# Patient Record
Sex: FEMALE | Race: BLACK OR AFRICAN AMERICAN | NOT HISPANIC OR LATINO | URBAN - METROPOLITAN AREA
[De-identification: names, ages, dates, MRNs, and addresses within clinical notes are randomized per-mention and may not be internally consistent; named-entity substitution may affect disease eponyms.]

---

## 2017-03-15 ENCOUNTER — INPATIENT (INPATIENT)
Facility: HOSPITAL | Age: 82
LOS: 2 days | Discharge: ANOTHER HOSPITAL | End: 2017-03-18
Attending: FAMILY MEDICINE | Admitting: FAMILY MEDICINE
Payer: MEDICARE

## 2017-03-15 VITALS
TEMPERATURE: 98 F | DIASTOLIC BLOOD PRESSURE: 70 MMHG | WEIGHT: 154.1 LBS | HEART RATE: 79 BPM | OXYGEN SATURATION: 99 % | SYSTOLIC BLOOD PRESSURE: 182 MMHG | RESPIRATION RATE: 20 BRPM | HEIGHT: 59 IN

## 2017-03-15 LAB
ALBUMIN SERPL ELPH-MCNC: 3 G/DL — LOW (ref 3.3–5)
ALP SERPL-CCNC: 90 U/L — SIGNIFICANT CHANGE UP (ref 40–120)
ALT FLD-CCNC: 19 U/L — SIGNIFICANT CHANGE UP (ref 12–78)
ANION GAP SERPL CALC-SCNC: 10 MMOL/L — SIGNIFICANT CHANGE UP (ref 5–17)
APPEARANCE UR: ABNORMAL
APTT BLD: 32.8 SEC — SIGNIFICANT CHANGE UP (ref 27.5–37.4)
AST SERPL-CCNC: 24 U/L — SIGNIFICANT CHANGE UP (ref 15–37)
BACTERIA # UR AUTO: ABNORMAL
BASOPHILS # BLD AUTO: 0.1 K/UL — SIGNIFICANT CHANGE UP (ref 0–0.2)
BASOPHILS NFR BLD AUTO: 1.3 % — SIGNIFICANT CHANGE UP (ref 0–2)
BILIRUB SERPL-MCNC: 0.2 MG/DL — SIGNIFICANT CHANGE UP (ref 0.2–1.2)
BILIRUB UR-MCNC: NEGATIVE — SIGNIFICANT CHANGE UP
BUN SERPL-MCNC: 41 MG/DL — HIGH (ref 7–23)
CALCIUM SERPL-MCNC: 9.1 MG/DL — SIGNIFICANT CHANGE UP (ref 8.5–10.1)
CHLORIDE SERPL-SCNC: 102 MMOL/L — SIGNIFICANT CHANGE UP (ref 96–108)
CK MB BLD-MCNC: 2.7 % — SIGNIFICANT CHANGE UP (ref 0–3.5)
CK MB CFR SERPL CALC: 5.4 NG/ML — HIGH (ref 0.5–3.6)
CK SERPL-CCNC: 200 U/L — HIGH (ref 26–192)
CO2 SERPL-SCNC: 22 MMOL/L — SIGNIFICANT CHANGE UP (ref 22–31)
COLOR SPEC: YELLOW — SIGNIFICANT CHANGE UP
CREAT SERPL-MCNC: 1.79 MG/DL — HIGH (ref 0.5–1.3)
DIFF PNL FLD: ABNORMAL
EOSINOPHIL # BLD AUTO: 0 K/UL — SIGNIFICANT CHANGE UP (ref 0–0.5)
EOSINOPHIL NFR BLD AUTO: 0 % — SIGNIFICANT CHANGE UP (ref 0–6)
EPI CELLS # UR: SIGNIFICANT CHANGE UP
GLUCOSE SERPL-MCNC: 94 MG/DL — SIGNIFICANT CHANGE UP (ref 70–99)
GLUCOSE UR QL: NEGATIVE MG/DL — SIGNIFICANT CHANGE UP
HCT VFR BLD CALC: 31.8 % — LOW (ref 34.5–45)
HGB BLD-MCNC: 10.7 G/DL — LOW (ref 11.5–15.5)
KETONES UR-MCNC: NEGATIVE — SIGNIFICANT CHANGE UP
LEUKOCYTE ESTERASE UR-ACNC: ABNORMAL
LIDOCAIN IGE QN: 70 U/L — LOW (ref 73–393)
LYMPHOCYTES # BLD AUTO: 0.6 K/UL — LOW (ref 1–3.3)
LYMPHOCYTES # BLD AUTO: 10.7 % — LOW (ref 13–44)
MCHC RBC-ENTMCNC: 28.8 PG — SIGNIFICANT CHANGE UP (ref 27–34)
MCHC RBC-ENTMCNC: 33.6 GM/DL — SIGNIFICANT CHANGE UP (ref 32–36)
MCV RBC AUTO: 85.7 FL — SIGNIFICANT CHANGE UP (ref 80–100)
MONOCYTES # BLD AUTO: 0.4 K/UL — SIGNIFICANT CHANGE UP (ref 0–0.9)
MONOCYTES NFR BLD AUTO: 6.8 % — SIGNIFICANT CHANGE UP (ref 2–14)
NEUTROPHILS # BLD AUTO: 4.3 K/UL — SIGNIFICANT CHANGE UP (ref 1.8–7.4)
NEUTROPHILS NFR BLD AUTO: 81.1 % — HIGH (ref 43–77)
NITRITE UR-MCNC: NEGATIVE — SIGNIFICANT CHANGE UP
PH UR: 8 — SIGNIFICANT CHANGE UP (ref 4.8–8)
PLATELET # BLD AUTO: 229 K/UL — SIGNIFICANT CHANGE UP (ref 150–400)
POTASSIUM SERPL-MCNC: 5.2 MMOL/L — SIGNIFICANT CHANGE UP (ref 3.5–5.3)
POTASSIUM SERPL-SCNC: 5.2 MMOL/L — SIGNIFICANT CHANGE UP (ref 3.5–5.3)
PROT SERPL-MCNC: 7.9 GM/DL — SIGNIFICANT CHANGE UP (ref 6–8.3)
PROT UR-MCNC: 100 MG/DL
RBC # BLD: 3.71 M/UL — LOW (ref 3.8–5.2)
RBC # FLD: 13.5 % — SIGNIFICANT CHANGE UP (ref 11–15)
RBC CASTS # UR COMP ASSIST: SIGNIFICANT CHANGE UP /HPF (ref 0–4)
SODIUM SERPL-SCNC: 134 MMOL/L — LOW (ref 135–145)
SP GR SPEC: 1.01 — SIGNIFICANT CHANGE UP (ref 1.01–1.02)
TROPONIN I SERPL-MCNC: 0.66 NG/ML — HIGH (ref 0.01–0.04)
UROBILINOGEN FLD QL: NEGATIVE MG/DL — SIGNIFICANT CHANGE UP
WBC # BLD: 5.3 K/UL — SIGNIFICANT CHANGE UP (ref 3.8–10.5)
WBC # FLD AUTO: 5.3 K/UL — SIGNIFICANT CHANGE UP (ref 3.8–10.5)
WBC UR QL: ABNORMAL

## 2017-03-15 PROCEDURE — 74176 CT ABD & PELVIS W/O CONTRAST: CPT | Mod: 26

## 2017-03-15 PROCEDURE — 93880 EXTRACRANIAL BILAT STUDY: CPT | Mod: 26

## 2017-03-15 PROCEDURE — 99285 EMERGENCY DEPT VISIT HI MDM: CPT

## 2017-03-15 PROCEDURE — 71010: CPT | Mod: 26

## 2017-03-15 RX ORDER — IOHEXOL 300 MG/ML
30 INJECTION, SOLUTION INTRAVENOUS ONCE
Qty: 0 | Refills: 0 | Status: COMPLETED | OUTPATIENT
Start: 2017-03-15 | End: 2017-03-15

## 2017-03-15 RX ORDER — PANTOPRAZOLE SODIUM 20 MG/1
40 TABLET, DELAYED RELEASE ORAL ONCE
Qty: 0 | Refills: 0 | Status: COMPLETED | OUTPATIENT
Start: 2017-03-15 | End: 2017-03-15

## 2017-03-15 RX ORDER — SODIUM CHLORIDE 9 MG/ML
3 INJECTION INTRAMUSCULAR; INTRAVENOUS; SUBCUTANEOUS ONCE
Qty: 0 | Refills: 0 | Status: COMPLETED | OUTPATIENT
Start: 2017-03-15 | End: 2017-03-15

## 2017-03-15 RX ORDER — ONDANSETRON 8 MG/1
4 TABLET, FILM COATED ORAL ONCE
Qty: 0 | Refills: 0 | Status: COMPLETED | OUTPATIENT
Start: 2017-03-15 | End: 2017-03-15

## 2017-03-15 RX ADMIN — PANTOPRAZOLE SODIUM 40 MILLIGRAM(S): 20 TABLET, DELAYED RELEASE ORAL at 19:54

## 2017-03-15 RX ADMIN — IOHEXOL 30 MILLILITER(S): 300 INJECTION, SOLUTION INTRAVENOUS at 19:55

## 2017-03-15 RX ADMIN — SODIUM CHLORIDE 3 MILLILITER(S): 9 INJECTION INTRAMUSCULAR; INTRAVENOUS; SUBCUTANEOUS at 19:54

## 2017-03-15 RX ADMIN — ONDANSETRON 4 MILLIGRAM(S): 8 TABLET, FILM COATED ORAL at 19:56

## 2017-03-15 NOTE — ED PROVIDER NOTE - PSH
Amputation foot, unilat    S/P appendectomy    S/P cataract surgery    S/P foot surgery  left foot surgery with sage s/p fx.  S/P hysterectomy

## 2017-03-15 NOTE — ED PROVIDER NOTE - MEDICAL DECISION MAKING DETAILS
Patient with NSTEMI and UTI. Labs and imaging reviewed. Patient received ASA, plavix, heparin and ceftriaxone. She is now admitted to medicine for further management.

## 2017-03-15 NOTE — ED PROVIDER NOTE - CARE PLAN
Principal Discharge DX:	NSTEMI (non-ST elevated myocardial infarction)  Secondary Diagnosis:	UTI (urinary tract infection)

## 2017-03-15 NOTE — ED ADULT TRIAGE NOTE - CHIEF COMPLAINT QUOTE
patient c/o of N/V and diarrhea started yesterday morning . denied any bleeding , patient denied abdominal pain

## 2017-03-15 NOTE — ED PROVIDER NOTE - OBJECTIVE STATEMENT
Pertinent PMH/PSH/FHx/SHx and Review of Systems contained within:  85 yo f with PMH of DM and HTN presents in ED c/o 2 day history of epigastric pain associated with nbnb vomitus with loose stool. Patient also reports sudden, sharp right sided neck pain. No fever, No photophobia/eye pain/changes in vision, No ear pain/sore throat/dysphagia, No chest pain/palpitations, no SOB/cough/wheeze/stridor, No D, no dysuria/frequency/discharge, No back pain, no rash, no changes in neurological status/function

## 2017-03-16 LAB
AMYLASE P1 CFR SERPL: 163 U/L — HIGH (ref 25–115)
ANION GAP SERPL CALC-SCNC: 9 MMOL/L — SIGNIFICANT CHANGE UP (ref 5–17)
APTT BLD: 143.1 SEC — CRITICAL HIGH (ref 27.5–37.4)
BUN SERPL-MCNC: 39 MG/DL — HIGH (ref 7–23)
CALCIUM SERPL-MCNC: 9.1 MG/DL — SIGNIFICANT CHANGE UP (ref 8.5–10.1)
CHLORIDE SERPL-SCNC: 106 MMOL/L — SIGNIFICANT CHANGE UP (ref 96–108)
CHOLEST SERPL-MCNC: 187 MG/DL — SIGNIFICANT CHANGE UP (ref 10–199)
CO2 SERPL-SCNC: 23 MMOL/L — SIGNIFICANT CHANGE UP (ref 22–31)
CREAT SERPL-MCNC: 1.92 MG/DL — HIGH (ref 0.5–1.3)
GLUCOSE SERPL-MCNC: 68 MG/DL — LOW (ref 70–99)
HBA1C BLD-MCNC: 7.8 % — HIGH (ref 4–5.6)
HCT VFR BLD CALC: 29.7 % — LOW (ref 34.5–45)
HDLC SERPL-MCNC: 77 MG/DL — SIGNIFICANT CHANGE UP (ref 40–125)
HGB BLD-MCNC: 10 G/DL — LOW (ref 11.5–15.5)
LACTATE SERPL-SCNC: 0.5 MMOL/L — LOW (ref 0.7–2)
LIDOCAIN IGE QN: 59 U/L — LOW (ref 73–393)
LIPID PNL WITH DIRECT LDL SERPL: 102 MG/DL — SIGNIFICANT CHANGE UP
MAGNESIUM SERPL-MCNC: 2.5 MG/DL — HIGH (ref 1.8–2.4)
MCHC RBC-ENTMCNC: 29 PG — SIGNIFICANT CHANGE UP (ref 27–34)
MCHC RBC-ENTMCNC: 33.8 GM/DL — SIGNIFICANT CHANGE UP (ref 32–36)
MCV RBC AUTO: 85.6 FL — SIGNIFICANT CHANGE UP (ref 80–100)
NT-PROBNP SERPL-SCNC: 4877 PG/ML — HIGH (ref 0–450)
PLATELET # BLD AUTO: 201 K/UL — SIGNIFICANT CHANGE UP (ref 150–400)
POTASSIUM SERPL-MCNC: 4.9 MMOL/L — SIGNIFICANT CHANGE UP (ref 3.5–5.3)
POTASSIUM SERPL-SCNC: 4.9 MMOL/L — SIGNIFICANT CHANGE UP (ref 3.5–5.3)
RBC # BLD: 3.47 M/UL — LOW (ref 3.8–5.2)
RBC # FLD: 13.7 % — SIGNIFICANT CHANGE UP (ref 11–15)
SODIUM SERPL-SCNC: 138 MMOL/L — SIGNIFICANT CHANGE UP (ref 135–145)
T3 SERPL-MCNC: 78 NG/DL — LOW (ref 80–200)
T4 AB SER-ACNC: 6 UG/DL — SIGNIFICANT CHANGE UP (ref 4.6–12)
TOTAL CHOLESTEROL/HDL RATIO MEASUREMENT: 2.4 RATIO — LOW (ref 3.3–7.1)
TRIGL SERPL-MCNC: 42 MG/DL — SIGNIFICANT CHANGE UP (ref 10–149)
TROPONIN I SERPL-MCNC: 0.62 NG/ML — HIGH (ref 0.01–0.04)
TROPONIN I SERPL-MCNC: 0.63 NG/ML — HIGH (ref 0.01–0.04)
TSH SERPL-MCNC: 2.57 UIU/ML — SIGNIFICANT CHANGE UP (ref 0.36–3.74)
WBC # BLD: 3.8 K/UL — SIGNIFICANT CHANGE UP (ref 3.8–10.5)
WBC # FLD AUTO: 3.8 K/UL — SIGNIFICANT CHANGE UP (ref 3.8–10.5)

## 2017-03-16 PROCEDURE — 93306 TTE W/DOPPLER COMPLETE: CPT | Mod: 26

## 2017-03-16 RX ORDER — SODIUM CHLORIDE 9 MG/ML
1000 INJECTION, SOLUTION INTRAVENOUS
Qty: 0 | Refills: 0 | Status: DISCONTINUED | OUTPATIENT
Start: 2017-03-16 | End: 2017-03-18

## 2017-03-16 RX ORDER — CEFTRIAXONE 500 MG/1
1 INJECTION, POWDER, FOR SOLUTION INTRAMUSCULAR; INTRAVENOUS ONCE
Qty: 0 | Refills: 0 | Status: COMPLETED | OUTPATIENT
Start: 2017-03-16 | End: 2017-03-16

## 2017-03-16 RX ORDER — PANTOPRAZOLE SODIUM 20 MG/1
40 TABLET, DELAYED RELEASE ORAL
Qty: 0 | Refills: 0 | Status: DISCONTINUED | OUTPATIENT
Start: 2017-03-17 | End: 2017-03-17

## 2017-03-16 RX ORDER — METOPROLOL TARTRATE 50 MG
25 TABLET ORAL
Qty: 0 | Refills: 0 | Status: DISCONTINUED | OUTPATIENT
Start: 2017-03-16 | End: 2017-03-18

## 2017-03-16 RX ORDER — ASPIRIN/CALCIUM CARB/MAGNESIUM 324 MG
325 TABLET ORAL DAILY
Qty: 0 | Refills: 0 | Status: DISCONTINUED | OUTPATIENT
Start: 2017-03-16 | End: 2017-03-18

## 2017-03-16 RX ORDER — SODIUM CHLORIDE 9 MG/ML
1000 INJECTION INTRAMUSCULAR; INTRAVENOUS; SUBCUTANEOUS
Qty: 0 | Refills: 0 | Status: DISCONTINUED | OUTPATIENT
Start: 2017-03-16 | End: 2017-03-18

## 2017-03-16 RX ORDER — ASPIRIN/CALCIUM CARB/MAGNESIUM 324 MG
325 TABLET ORAL ONCE
Qty: 0 | Refills: 0 | Status: COMPLETED | OUTPATIENT
Start: 2017-03-16 | End: 2017-03-16

## 2017-03-16 RX ORDER — INSULIN GLARGINE 100 [IU]/ML
5 INJECTION, SOLUTION SUBCUTANEOUS AT BEDTIME
Qty: 0 | Refills: 0 | Status: DISCONTINUED | OUTPATIENT
Start: 2017-03-16 | End: 2017-03-16

## 2017-03-16 RX ORDER — LISINOPRIL 2.5 MG/1
5 TABLET ORAL DAILY
Qty: 0 | Refills: 0 | Status: COMPLETED | OUTPATIENT
Start: 2017-03-16 | End: 2017-03-16

## 2017-03-16 RX ORDER — CLOPIDOGREL BISULFATE 75 MG/1
300 TABLET, FILM COATED ORAL ONCE
Qty: 0 | Refills: 0 | Status: COMPLETED | OUTPATIENT
Start: 2017-03-16 | End: 2017-03-16

## 2017-03-16 RX ORDER — INSULIN GLARGINE 100 [IU]/ML
12 INJECTION, SOLUTION SUBCUTANEOUS EVERY MORNING
Qty: 0 | Refills: 0 | Status: DISCONTINUED | OUTPATIENT
Start: 2017-03-16 | End: 2017-03-18

## 2017-03-16 RX ORDER — CEFTRIAXONE 500 MG/1
1 INJECTION, POWDER, FOR SOLUTION INTRAMUSCULAR; INTRAVENOUS EVERY 24 HOURS
Qty: 0 | Refills: 0 | Status: DISCONTINUED | OUTPATIENT
Start: 2017-03-17 | End: 2017-03-18

## 2017-03-16 RX ORDER — HEPARIN SODIUM 5000 [USP'U]/ML
INJECTION INTRAVENOUS; SUBCUTANEOUS
Qty: 25000 | Refills: 0 | Status: DISCONTINUED | OUTPATIENT
Start: 2017-03-16 | End: 2017-03-16

## 2017-03-16 RX ORDER — HEPARIN SODIUM 5000 [USP'U]/ML
4100 INJECTION INTRAVENOUS; SUBCUTANEOUS ONCE
Qty: 0 | Refills: 0 | Status: COMPLETED | OUTPATIENT
Start: 2017-03-16 | End: 2017-03-16

## 2017-03-16 RX ORDER — PANTOPRAZOLE SODIUM 20 MG/1
40 TABLET, DELAYED RELEASE ORAL EVERY 12 HOURS
Qty: 0 | Refills: 0 | Status: DISCONTINUED | OUTPATIENT
Start: 2017-03-16 | End: 2017-03-16

## 2017-03-16 RX ORDER — HEPARIN SODIUM 5000 [USP'U]/ML
4100 INJECTION INTRAVENOUS; SUBCUTANEOUS EVERY 6 HOURS
Qty: 0 | Refills: 0 | Status: DISCONTINUED | OUTPATIENT
Start: 2017-03-16 | End: 2017-03-16

## 2017-03-16 RX ORDER — DEXTROSE 50 % IN WATER 50 %
12.5 SYRINGE (ML) INTRAVENOUS ONCE
Qty: 0 | Refills: 0 | Status: COMPLETED | OUTPATIENT
Start: 2017-03-16 | End: 2017-03-16

## 2017-03-16 RX ORDER — DEXTROSE 50 % IN WATER 50 %
1 SYRINGE (ML) INTRAVENOUS ONCE
Qty: 0 | Refills: 0 | Status: DISCONTINUED | OUTPATIENT
Start: 2017-03-16 | End: 2017-03-18

## 2017-03-16 RX ORDER — GLUCAGON INJECTION, SOLUTION 0.5 MG/.1ML
1 INJECTION, SOLUTION SUBCUTANEOUS ONCE
Qty: 0 | Refills: 0 | Status: DISCONTINUED | OUTPATIENT
Start: 2017-03-16 | End: 2017-03-18

## 2017-03-16 RX ORDER — CEFTRIAXONE 500 MG/1
INJECTION, POWDER, FOR SOLUTION INTRAMUSCULAR; INTRAVENOUS
Qty: 0 | Refills: 0 | Status: DISCONTINUED | OUTPATIENT
Start: 2017-03-16 | End: 2017-03-18

## 2017-03-16 RX ORDER — ATORVASTATIN CALCIUM 80 MG/1
20 TABLET, FILM COATED ORAL AT BEDTIME
Qty: 0 | Refills: 0 | Status: DISCONTINUED | OUTPATIENT
Start: 2017-03-16 | End: 2017-03-18

## 2017-03-16 RX ORDER — NITROGLYCERIN 6.5 MG
0.4 CAPSULE, EXTENDED RELEASE ORAL
Qty: 0 | Refills: 0 | Status: DISCONTINUED | OUTPATIENT
Start: 2017-03-16 | End: 2017-03-18

## 2017-03-16 RX ORDER — MORPHINE SULFATE 50 MG/1
2 CAPSULE, EXTENDED RELEASE ORAL EVERY 4 HOURS
Qty: 0 | Refills: 0 | Status: DISCONTINUED | OUTPATIENT
Start: 2017-03-16 | End: 2017-03-18

## 2017-03-16 RX ORDER — ENOXAPARIN SODIUM 100 MG/ML
30 INJECTION SUBCUTANEOUS DAILY
Qty: 0 | Refills: 0 | Status: DISCONTINUED | OUTPATIENT
Start: 2017-03-16 | End: 2017-03-18

## 2017-03-16 RX ORDER — INSULIN LISPRO 100/ML
5 VIAL (ML) SUBCUTANEOUS
Qty: 0 | Refills: 0 | Status: DISCONTINUED | OUTPATIENT
Start: 2017-03-16 | End: 2017-03-18

## 2017-03-16 RX ORDER — CLOPIDOGREL BISULFATE 75 MG/1
75 TABLET, FILM COATED ORAL DAILY
Qty: 0 | Refills: 0 | Status: DISCONTINUED | OUTPATIENT
Start: 2017-03-16 | End: 2017-03-18

## 2017-03-16 RX ORDER — DEXTROSE 50 % IN WATER 50 %
12.5 SYRINGE (ML) INTRAVENOUS ONCE
Qty: 0 | Refills: 0 | Status: DISCONTINUED | OUTPATIENT
Start: 2017-03-16 | End: 2017-03-18

## 2017-03-16 RX ADMIN — HEPARIN SODIUM 4100 UNIT(S): 5000 INJECTION INTRAVENOUS; SUBCUTANEOUS at 01:14

## 2017-03-16 RX ADMIN — PANTOPRAZOLE SODIUM 40 MILLIGRAM(S): 20 TABLET, DELAYED RELEASE ORAL at 06:19

## 2017-03-16 RX ADMIN — Medication 25 MILLIGRAM(S): at 01:13

## 2017-03-16 RX ADMIN — HEPARIN SODIUM 800 UNIT(S)/HR: 5000 INJECTION INTRAVENOUS; SUBCUTANEOUS at 01:40

## 2017-03-16 RX ADMIN — CLOPIDOGREL BISULFATE 300 MILLIGRAM(S): 75 TABLET, FILM COATED ORAL at 01:13

## 2017-03-16 RX ADMIN — Medication 25 MILLIGRAM(S): at 17:26

## 2017-03-16 RX ADMIN — Medication 325 MILLIGRAM(S): at 12:14

## 2017-03-16 RX ADMIN — Medication 325 MILLIGRAM(S): at 01:17

## 2017-03-16 RX ADMIN — ATORVASTATIN CALCIUM 20 MILLIGRAM(S): 80 TABLET, FILM COATED ORAL at 21:37

## 2017-03-16 RX ADMIN — ENOXAPARIN SODIUM 30 MILLIGRAM(S): 100 INJECTION SUBCUTANEOUS at 12:14

## 2017-03-16 RX ADMIN — Medication 5 UNIT(S): at 13:07

## 2017-03-16 RX ADMIN — CEFTRIAXONE 100 GRAM(S): 500 INJECTION, POWDER, FOR SOLUTION INTRAMUSCULAR; INTRAVENOUS at 01:17

## 2017-03-16 RX ADMIN — SODIUM CHLORIDE 60 MILLILITER(S): 9 INJECTION INTRAMUSCULAR; INTRAVENOUS; SUBCUTANEOUS at 17:26

## 2017-03-16 RX ADMIN — ATORVASTATIN CALCIUM 20 MILLIGRAM(S): 80 TABLET, FILM COATED ORAL at 01:13

## 2017-03-16 RX ADMIN — LISINOPRIL 5 MILLIGRAM(S): 2.5 TABLET ORAL at 06:20

## 2017-03-16 RX ADMIN — CLOPIDOGREL BISULFATE 75 MILLIGRAM(S): 75 TABLET, FILM COATED ORAL at 12:13

## 2017-03-16 RX ADMIN — Medication 12.5 GRAM(S): at 02:34

## 2017-03-16 NOTE — H&P ADULT. - ASSESSMENT
> troponin, r/o ACS; UTI, gastroenteritis. Brittle DM  ASA plavix, heparin, B -blocker, Statin  Cardiology.  BS control

## 2017-03-16 NOTE — CONSULT NOTE ADULT - SUBJECTIVE AND OBJECTIVE BOX
MEDICAL RECORD REVIEWED; HISTORY AND  REVIEW OF SYSTEMS OBTAINED; PATIENT EXAMINED:    CALLED FOR CHEST PAIN IN 84 YEAR OLD FEMALE; EQUIVOCAL TROPONIN; ECG: NSR LAFB/RBBB/VPC  ALL LABS/RADIOLOGY/MEDICATIONS REVIEWED;  NO PRESNT COMPLAINTS OF CHEST PAIN NO JVD;CLEAR LUNGS; REGULAR RATE & RHYTHM, NORMAL S1& S2, NO SIGNIFICANT MURMURS; NON TENDER ABDOMEN; NO EDEMA    IMPRESSION:    EQUIVOCAL TROPONINS/CHEST PAIN: POSSIBLE MYOCARDIAL ISCHEMIA  MONITOR TROPONINS & ECG  AGREE WITH PRESENT MANAGEMENT   RECCOMMENDATIONS PENDING COURSE    DAYO COTTON MD, FACC

## 2017-03-16 NOTE — CONSULT NOTE ADULT - SUBJECTIVE AND OBJECTIVE BOX
Patient is a 84y old  Female who presents with a chief complaint of epigastric pain, vomiting in addition to mild limited diarrhea. Noted Cr elevation 1.7-1.9 above baseline of 1.4. Troponin trend noted. Has long standing hx of DM with complications of retinopathy, neuropathy and prob nephropathy.       HPI:  pt had on Tu and Wd in am episodes of vomiting, 3x loose BM. On Wd BS < 56 in AM. had pain in epigastric area and R neck.  Presently no c/o (16 Mar 2017 00:34)      PAST MEDICAL & SURGICAL HISTORY:  PVD (peripheral vascular disease)  HTN (hypertension)  DM (diabetes mellitus screen)  S/P foot surgery: left foot surgery with sage s/p fx.  S/P cataract surgery  S/P appendectomy  S/P hysterectomy  Amputation foot, unilat      Social Hx: denies smoking    FAMILY HISTORY:  No pertinent family history in first degree relatives      Allergies    codeine (Other)    Intolerances        MEDICATIONS  (STANDING):  atorvastatin 20milliGRAM(s) Oral at bedtime  metoprolol 25milliGRAM(s) Oral two times a day  clopidogrel Tablet 75milliGRAM(s) Oral daily  insulin lispro Injectable (HumaLOG) 5Unit(s) SubCutaneous three times a day before meals  dextrose 5%. 1000milliLiter(s) IV Continuous <Continuous>  dextrose 50% Injectable 12.5Gram(s) IV Push once  pantoprazole  Injectable 40milliGRAM(s) IV Push every 12 hours  cefTRIAXone   IVPB  IV Intermittent   aspirin 325milliGRAM(s) Oral daily  enoxaparin Injectable 30milliGRAM(s) SubCutaneous daily  insulin glargine Injectable (LANTUS) 12Unit(s) SubCutaneous every morning  sodium chloride 0.9%. 1000milliLiter(s) IV Continuous <Continuous>    MEDICATIONS  (PRN):  nitroglycerin     SubLingual 0.4milliGRAM(s) SubLingual every 5 minutes PRN Chest Pain  dextrose Gel 1Dose(s) Oral once PRN Blood Glucose LESS THAN 70 milliGRAM(s)/deciliter  glucagon  Injectable 1milliGRAM(s) IntraMuscular once PRN Glucose LESS THAN 70 milligrams/deciliter  morphine  - Injectable 2milliGRAM(s) IV Push every 4 hours PRN Moderate Pain (4 - 6)      Daily Height in cm: 149.86 (15 Mar 2017 18:08)    Daily     Drug Dosing Weight  Height (cm): 149.9 (15 Mar 2017 18:08)  Weight (kg): 67.9 (16 Mar 2017 04:30)  BMI (kg/m2): 30.2 (16 Mar 2017 04:30)  BSA (m2): 1.63 (16 Mar 2017 04:30)      REVIEW OF SYSTEMS:    CONSTITUTIONAL: pos fatigue  ENMT:  No difficulty hearing, tinnitus, vertigo; No sinus or throat pain  NECK: No pain or stiffness  RESPIRATORY: No cough, wheezing, chills or hemoptysis; transient shortness of breath  CARDIOVASCULAR: transient chest pain, palpitations, dizziness, or leg swelling  GASTROINTESTINAL: No abdominal or epigastric pain. POS nausea, vomiting, diarrhea.  GENITOURINARY: No dysuria, frequency, hematuria, or incontinence  SKIN: No itching, burning, rashes, or lesions   LYMPH NODES: No enlarged glands  NEURO: no asterixis            I&O's Detail        PHYSICAL EXAM:    GENERAL: NAD  HEAD:  Atraumatic, Normocephalic  EYES: EOMI, PERRLA, conjunctiva and sclera clear  ENMT: No tonsillar erythema, exudates, or enlargement; Moist mucous membranes, Good dentition, No lesions  NECK: Supple, No JVD, Normal thyroid  NERVOUS SYSTEM:  Alert & Oriented X3, Good concentration; Motor Strength 5/5 B/L upper and lower extremities; DTRs 2+ intact and symmetric  CHEST/LUNG: Clear to percussion bilaterally; No rales, rhonchi, wheezing, or rubs  HEART: Regular rate and rhythm; No murmurs, rubs, or gallops  ABDOMEN: Soft, Nontender, Nondistended; Bowel sounds present  EXTREMITIES:  2+ Peripheral Pulses, No clubbing, cyanosis, or edema  LYMPH: No lymphadenopathy noted  SKIN: No rashes or lesions    LABS:  CBC Full  -  ( 16 Mar 2017 08:24 )  WBC Count : 3.8 K/uL  Hemoglobin : 10.0 g/dL  Hematocrit : 29.7 %  Platelet Count - Automated : 201 K/uL  Mean Cell Volume : 85.6 fl  Mean Cell Hemoglobin : 29.0 pg  Mean Cell Hemoglobin Concentration : 33.8 gm/dL  Auto Neutrophil # : x  Auto Lymphocyte # : x  Auto Monocyte # : x  Auto Eosinophil # : x  Auto Basophil # : x  Auto Neutrophil % : x  Auto Lymphocyte % : x  Auto Monocyte % : x  Auto Eosinophil % : x  Auto Basophil % : x    16 Mar 2017 08:24    138    |  106    |  39     ----------------------------<  68     4.9     |  23     |  1.92     Ca    9.1        16 Mar 2017 08:24    TPro  7.9    /  Alb  3.0    /  TBili  0.2    /  DBili  x      /  AST  24     /  ALT  19     /  AlkPhos  90     15 Mar 2017 21:20    CAPILLARY BLOOD GLUCOSE  76 (16 Mar 2017 07:29)    PTT - ( 16 Mar 2017 08:24 )  PTT:143.1 sec  Urinalysis Basic - ( 15 Mar 2017 21:06 )    Color: Yellow / Appearance: Slightly Turbid / S.010 / pH: x  Gluc: x / Ketone: Negative  / Bili: Negative / Urobili: Negative mg/dL   Blood: x / Protein: 100 mg/dL / Nitrite: Negative   Leuk Esterase: Moderate / RBC: 0-2 /HPF / WBC 26-50   Sq Epi: x / Non Sq Epi: Occasional / Bacteria: TNTC      CARDIAC MARKERS ( 16 Mar 2017 08:24 )  .628 ng/mL / x     / x     / x     / x      CARDIAC MARKERS ( 16 Mar 2017 02:49 )  .618 ng/mL / x     / x     / x     / x      CARDIAC MARKERS ( 15 Mar 2017 21:20 )  .664 ng/mL / x     / 200 U/L / x     / 5.4 ng/mL      ASSESSMENT and PLAN:  * MAXIMINO on CKD -- no hydro on CT A/P. Suspect pre-renal azotemia as etiology. Obtain UA, FeNa. Agree with a trial of saline for 24h as long as pt is not SOB.   Repeat BMP in am

## 2017-03-16 NOTE — H&P ADULT. - HISTORY OF PRESENT ILLNESS
pt had on Tu and Wd in am episodes of vomiting, 3x loose BM. On Wd BS < 56 in AM. had pain in epigastric area and R neck.  Presently no c/o

## 2017-03-17 LAB
ANION GAP SERPL CALC-SCNC: 10 MMOL/L — SIGNIFICANT CHANGE UP (ref 5–17)
BUN SERPL-MCNC: 34 MG/DL — HIGH (ref 7–23)
CALCIUM SERPL-MCNC: 8.5 MG/DL — SIGNIFICANT CHANGE UP (ref 8.5–10.1)
CHLORIDE SERPL-SCNC: 108 MMOL/L — SIGNIFICANT CHANGE UP (ref 96–108)
CO2 SERPL-SCNC: 23 MMOL/L — SIGNIFICANT CHANGE UP (ref 22–31)
CREAT SERPL-MCNC: 1.82 MG/DL — HIGH (ref 0.5–1.3)
CULTURE RESULTS: SIGNIFICANT CHANGE UP
GLUCOSE SERPL-MCNC: 115 MG/DL — HIGH (ref 70–99)
HCT VFR BLD CALC: 29.2 % — LOW (ref 34.5–45)
HGB BLD-MCNC: 9.4 G/DL — LOW (ref 11.5–15.5)
MCHC RBC-ENTMCNC: 28.2 PG — SIGNIFICANT CHANGE UP (ref 27–34)
MCHC RBC-ENTMCNC: 32.4 GM/DL — SIGNIFICANT CHANGE UP (ref 32–36)
MCV RBC AUTO: 86.9 FL — SIGNIFICANT CHANGE UP (ref 80–100)
PLATELET # BLD AUTO: 206 K/UL — SIGNIFICANT CHANGE UP (ref 150–400)
POTASSIUM SERPL-MCNC: 4.7 MMOL/L — SIGNIFICANT CHANGE UP (ref 3.5–5.3)
POTASSIUM SERPL-SCNC: 4.7 MMOL/L — SIGNIFICANT CHANGE UP (ref 3.5–5.3)
RBC # BLD: 3.36 M/UL — LOW (ref 3.8–5.2)
RBC # FLD: 13.9 % — SIGNIFICANT CHANGE UP (ref 11–15)
SODIUM SERPL-SCNC: 141 MMOL/L — SIGNIFICANT CHANGE UP (ref 135–145)
SPECIMEN SOURCE: SIGNIFICANT CHANGE UP
WBC # BLD: 3.9 K/UL — SIGNIFICANT CHANGE UP (ref 3.8–10.5)
WBC # FLD AUTO: 3.9 K/UL — SIGNIFICANT CHANGE UP (ref 3.8–10.5)

## 2017-03-17 RX ORDER — PANTOPRAZOLE SODIUM 20 MG/1
40 TABLET, DELAYED RELEASE ORAL
Qty: 0 | Refills: 0 | Status: DISCONTINUED | OUTPATIENT
Start: 2017-03-17 | End: 2017-03-18

## 2017-03-17 RX ORDER — REGADENOSON 0.08 MG/ML
0.4 INJECTION, SOLUTION INTRAVENOUS ONCE
Qty: 0 | Refills: 0 | Status: DISCONTINUED | OUTPATIENT
Start: 2017-03-17 | End: 2017-03-18

## 2017-03-17 RX ADMIN — Medication 325 MILLIGRAM(S): at 11:33

## 2017-03-17 RX ADMIN — Medication 5 UNIT(S): at 11:33

## 2017-03-17 RX ADMIN — ENOXAPARIN SODIUM 30 MILLIGRAM(S): 100 INJECTION SUBCUTANEOUS at 11:33

## 2017-03-17 RX ADMIN — CEFTRIAXONE 100 GRAM(S): 500 INJECTION, POWDER, FOR SOLUTION INTRAMUSCULAR; INTRAVENOUS at 01:31

## 2017-03-17 RX ADMIN — CLOPIDOGREL BISULFATE 75 MILLIGRAM(S): 75 TABLET, FILM COATED ORAL at 11:33

## 2017-03-17 RX ADMIN — Medication 25 MILLIGRAM(S): at 17:26

## 2017-03-17 RX ADMIN — Medication 25 MILLIGRAM(S): at 05:27

## 2017-03-17 RX ADMIN — PANTOPRAZOLE SODIUM 40 MILLIGRAM(S): 20 TABLET, DELAYED RELEASE ORAL at 06:07

## 2017-03-17 RX ADMIN — PANTOPRAZOLE SODIUM 40 MILLIGRAM(S): 20 TABLET, DELAYED RELEASE ORAL at 17:26

## 2017-03-17 RX ADMIN — ATORVASTATIN CALCIUM 20 MILLIGRAM(S): 80 TABLET, FILM COATED ORAL at 22:10

## 2017-03-17 NOTE — PROGRESS NOTE ADULT - SUBJECTIVE AND OBJECTIVE BOX
VAGUE CHEST DISCOMFORT PERSISTS  TROPONIN REMAINS EQUIVOCAL  PHARMACOLOGICAL NUCLEAR STRESS TEST ON MONDAY TO CLARIFY

## 2017-03-17 NOTE — PROGRESS NOTE ADULT - SUBJECTIVE AND OBJECTIVE BOX
Patient is a 84y old  Female who presents with a chief complaint of epigastric pain, vomiting (16 Mar 2017 00:34)  "I felt good yesterday this AM epigastric pain"      INTERVAL HPI/OVERNIGHT EVENTS:    MEDICATIONS  (STANDING):  atorvastatin 20milliGRAM(s) Oral at bedtime  metoprolol 25milliGRAM(s) Oral two times a day  clopidogrel Tablet 75milliGRAM(s) Oral daily  insulin lispro Injectable (HumaLOG) 5Unit(s) SubCutaneous three times a day before meals  dextrose 5%. 1000milliLiter(s) IV Continuous <Continuous>  dextrose 50% Injectable 12.5Gram(s) IV Push once  cefTRIAXone   IVPB  IV Intermittent   aspirin 325milliGRAM(s) Oral daily  cefTRIAXone   IVPB 1Gram(s) IV Intermittent every 24 hours  enoxaparin Injectable 30milliGRAM(s) SubCutaneous daily  insulin glargine Injectable (LANTUS) 12Unit(s) SubCutaneous every morning  sodium chloride 0.9%. 1000milliLiter(s) IV Continuous <Continuous>  pantoprazole    Tablet 40milliGRAM(s) Oral before breakfast    MEDICATIONS  (PRN):  nitroglycerin     SubLingual 0.4milliGRAM(s) SubLingual every 5 minutes PRN Chest Pain  dextrose Gel 1Dose(s) Oral once PRN Blood Glucose LESS THAN 70 milliGRAM(s)/deciliter  glucagon  Injectable 1milliGRAM(s) IntraMuscular once PRN Glucose LESS THAN 70 milligrams/deciliter  morphine  - Injectable 2milliGRAM(s) IV Push every 4 hours PRN Moderate Pain (4 - 6)      Allergies    codeine (Other)    Intolerances        REVIEW OF SYSTEMS:        HEENT - wnl  RESPIRATORY: No cough, wheezing, chills or hemoptysis; No shortness of breath  CARDIOVASCULAR: No chest pain, palpitations, dizziness, or leg swelling  GASTROINTESTINAL:  epigastric pain. No nausea, vomiting, or hematemesis; Yesterday normal BM  GENITOURINARY: No dysuria, frequency, hematuria, or incontinence  SKIN: No itching, burning, rashes, or lesions   MUSCULOSKELETAL: No joint pain or swelling; No muscle, back, or extremity pain  PSYCHIATRIC: No depression, anxiety, mood swings, or difficulty sleeping        Vital Signs Last 24 Hrs  T(C): 36.4, Max: 36.9 (03-16 @ 17:34)  T(F): 97.5, Max: 98.4 (03-16 @ 17:34)  HR: 53 (53 - 77)  BP: 151/73 (151/73 - 159/51)  BP(mean): --  RR: 18 (16 - 18)  SpO2: 95% (95% - 99%)    PHYSICAL EXAM:  general       HEENT wnl  CHEST/LUNG: Clear to percussion bilaterally; No rales, rhonchi, wheezing, or rubs  HEART: Regular rate and rhythm; No murmurs, rubs, or gallops  ABDOMEN: Soft, Nontender, Nondistended; Bowel sounds present  EXTREMITIES:  2+ Peripheral Pulses, No clubbing, cyanosis, or edema  LYMPH: No lymphadenopathy noted  SKIN: No rashes or lesions    LABS:                        9.4    3.9   )-----------( 206      ( 17 Mar 2017 07:46 )             29.2     17 Mar 2017 07:46    141    |  108    |  34     ----------------------------<  115    4.7     |  23     |  1.82     Ca    8.5        17 Mar 2017 07:46  Mg     2.5       16 Mar 2017 10:51    TPro  7.9    /  Alb  3.0    /  TBili  0.2    /  DBili  x      /  AST  24     /  ALT  19     /  AlkPhos  90     15 Mar 2017 21:20    PTT - ( 16 Mar 2017 08:24 )  PTT:143.1 sec  Urinalysis Basic - ( 15 Mar 2017 21:06 )    Color: Yellow / Appearance: Slightly Turbid / S.010 / pH: x  Gluc: x / Ketone: Negative  / Bili: Negative / Urobili: Negative mg/dL   Blood: x / Protein: 100 mg/dL / Nitrite: Negative   Leuk Esterase: Moderate / RBC: 0-2 /HPF / WBC 26-50   Sq Epi: x / Non Sq Epi: Occasional / Bacteria: TNTC      CAPILLARY BLOOD GLUCOSE  114 (17 Mar 2017 08:00)  221 (16 Mar 2017 21:41)  67 (16 Mar 2017 17:20)  220 (16 Mar 2017 12:18)      CARDIAC MARKERS ( 16 Mar 2017 08:24 )  .628 ng/mL / x     / x     / x     / x      CARDIAC MARKERS ( 16 Mar 2017 02:49 )  .618 ng/mL / x     / x     / x     / x      CARDIAC MARKERS ( 15 Mar 2017 21:20 )  .664 ng/mL / x     / 200 U/L / x     / 5.4 ng/mL      Hemoglobin A1C, Whole Blood: 7.8 % ( @ 10:37)    Cholesterol, Serum: 187 mg/dL ( @ 08:23)  HDL Cholesterol, Serum: 77 mg/dL ( @ 08:23)  Triglycerides, Serum: 42 mg/dL ( @ 08:23)      RADIOLOGY & ADDITIONAL TESTS:    Imaging Personally Reviewed:  [y ] YES  [ ] NO    Consultant(s) Notes Reviewed:  [y ] YES  [ ] NO    Care Discussed with Consultants/Other Providers [ ] YES  [ ] NO    PROBLEMS:  NSTEMI, URINARY TRACT INFECTION  VOMITING ; DIARRHEA; TREMBLING  NSTEMI (non-ST elevated myocardial infarction)  ? gastritis ? gastroparesis  ARF/ CKD nephropathy      Care discussed with family,         [  v]   yes  [  ]  No    imp:    stable[ ]    unstable[  ]     improving [v   ]       unchanged  [  ]                Plans:  Continue present plans  [ v ] GI consult. OOB. d/c telemetry

## 2017-03-17 NOTE — PHYSICAL THERAPY INITIAL EVALUATION ADULT - MODIFIED CLINICAL TEST OF SENSORY INTEGRATION IN BALANCE TEST
Barthel Index: Feeding Score 10___, Bathing Score _5__, Grooming Score __5_, Dressing Score _10__, Bowels Score _10__, Bladder Score _10__, Toilet Score 10___, Transfers Score _15__, Mobility Score 15___, Stairs Score __10_,     Total Score ___100

## 2017-03-17 NOTE — PROGRESS NOTE ADULT - SUBJECTIVE AND OBJECTIVE BOX
Patient seen in follow up for CKD3; mild epigastric discomfort.  No n/v;     MEDICATIONS  (STANDING):  atorvastatin 20milliGRAM(s) Oral at bedtime  metoprolol 25milliGRAM(s) Oral two times a day  clopidogrel Tablet 75milliGRAM(s) Oral daily  insulin lispro Injectable (HumaLOG) 5Unit(s) SubCutaneous three times a day before meals  dextrose 5%. 1000milliLiter(s) IV Continuous <Continuous>  dextrose 50% Injectable 12.5Gram(s) IV Push once  cefTRIAXone   IVPB  IV Intermittent   aspirin 325milliGRAM(s) Oral daily  cefTRIAXone   IVPB 1Gram(s) IV Intermittent every 24 hours  enoxaparin Injectable 30milliGRAM(s) SubCutaneous daily  insulin glargine Injectable (LANTUS) 12Unit(s) SubCutaneous every morning  sodium chloride 0.9%. 1000milliLiter(s) IV Continuous <Continuous>  pantoprazole    Tablet 40milliGRAM(s) Oral two times a day before meals  regadenoson Injectable 0.4milliGRAM(s) IV Push once    MEDICATIONS  (PRN):  nitroglycerin     SubLingual 0.4milliGRAM(s) SubLingual every 5 minutes PRN Chest Pain  dextrose Gel 1Dose(s) Oral once PRN Blood Glucose LESS THAN 70 milliGRAM(s)/deciliter  glucagon  Injectable 1milliGRAM(s) IntraMuscular once PRN Glucose LESS THAN 70 milligrams/deciliter  morphine  - Injectable 2milliGRAM(s) IV Push every 4 hours PRN Moderate Pain (4 - 6)    PHYSICAL EXAM:      T(C): 36.8, Max: 36.9 (03-16 @ 17:34)  HR: 69 (53 - 77)  BP: 187/69 (151/73 - 187/69)  RR: 18 (18 - 18)  SpO2: 98% (95% - 99%)  Wt(kg): --  Respiratory: clear anteriorly, decreased BS at bases  Cardiovascular: S1 S2  Gastrointestinal: soft NT ND +BS  Extremities:   tr edema                                    9.4    3.9   )-----------( 206      ( 17 Mar 2017 07:46 )             29.2     17 Mar 2017 07:46    141    |  108    |  34     ----------------------------<  115    4.7     |  23     |  1.82     Ca    8.5        17 Mar 2017 07:46  Mg     2.5       16 Mar 2017 10:51    TPro  7.9    /  Alb  3.0    /  TBili  0.2    /  DBili  x      /  AST  24     /  ALT  19     /  AlkPhos  90     15 Mar 2017 21:20      LIVER FUNCTIONS - ( 15 Mar 2017 21:20 )  Alb: 3.0 g/dL / Pro: 7.9 gm/dL / ALK PHOS: 90 U/L / ALT: 19 U/L / AST: 24 U/L / GGT: x             Assessment and Plan:  CKD 3 stable;   Continue IVF until PO intake adequate;  GI follow up.

## 2017-03-17 NOTE — CONSULT NOTE ADULT - SUBJECTIVE AND OBJECTIVE BOX
full consult dictated    Agree with BID Protonix  if symptoms persist consider adding carafate  For nuclear stress test on Monday full consult dictated    Pt with h/o PUD - symptoms are similar  Agree with BID Protonix  if symptoms persist consider adding carafate  For nuclear stress test on Monday

## 2017-03-18 VITALS
TEMPERATURE: 99 F | HEART RATE: 60 BPM | SYSTOLIC BLOOD PRESSURE: 173 MMHG | OXYGEN SATURATION: 98 % | RESPIRATION RATE: 18 BRPM | DIASTOLIC BLOOD PRESSURE: 60 MMHG

## 2017-03-18 LAB
ANION GAP SERPL CALC-SCNC: 6 MMOL/L — SIGNIFICANT CHANGE UP (ref 5–17)
BASOPHILS # BLD AUTO: 0.2 K/UL — SIGNIFICANT CHANGE UP (ref 0–0.2)
BASOPHILS NFR BLD AUTO: 3.5 % — HIGH (ref 0–2)
BUN SERPL-MCNC: 31 MG/DL — HIGH (ref 7–23)
CALCIUM SERPL-MCNC: 8.6 MG/DL — SIGNIFICANT CHANGE UP (ref 8.5–10.1)
CHLORIDE SERPL-SCNC: 111 MMOL/L — HIGH (ref 96–108)
CK MB BLD-MCNC: 2.6 % — SIGNIFICANT CHANGE UP (ref 0–3.5)
CK MB CFR SERPL CALC: 2.7 NG/ML — SIGNIFICANT CHANGE UP (ref 0.5–3.6)
CK SERPL-CCNC: 102 U/L — SIGNIFICANT CHANGE UP (ref 26–192)
CO2 SERPL-SCNC: 24 MMOL/L — SIGNIFICANT CHANGE UP (ref 22–31)
CREAT SERPL-MCNC: 1.55 MG/DL — HIGH (ref 0.5–1.3)
EOSINOPHIL # BLD AUTO: 0.2 K/UL — SIGNIFICANT CHANGE UP (ref 0–0.5)
EOSINOPHIL NFR BLD AUTO: 4.6 % — SIGNIFICANT CHANGE UP (ref 0–6)
GLUCOSE SERPL-MCNC: 121 MG/DL — HIGH (ref 70–99)
HCT VFR BLD CALC: 28.3 % — LOW (ref 34.5–45)
HGB BLD-MCNC: 9.4 G/DL — LOW (ref 11.5–15.5)
LYMPHOCYTES # BLD AUTO: 1.1 K/UL — SIGNIFICANT CHANGE UP (ref 1–3.3)
LYMPHOCYTES # BLD AUTO: 24.9 % — SIGNIFICANT CHANGE UP (ref 13–44)
MCHC RBC-ENTMCNC: 28.8 PG — SIGNIFICANT CHANGE UP (ref 27–34)
MCHC RBC-ENTMCNC: 33.2 GM/DL — SIGNIFICANT CHANGE UP (ref 32–36)
MCV RBC AUTO: 86.8 FL — SIGNIFICANT CHANGE UP (ref 80–100)
MONOCYTES # BLD AUTO: 0.5 K/UL — SIGNIFICANT CHANGE UP (ref 0–0.9)
MONOCYTES NFR BLD AUTO: 10.9 % — SIGNIFICANT CHANGE UP (ref 2–14)
NEUTROPHILS # BLD AUTO: 2.6 K/UL — SIGNIFICANT CHANGE UP (ref 1.8–7.4)
NEUTROPHILS NFR BLD AUTO: 56.1 % — SIGNIFICANT CHANGE UP (ref 43–77)
PLATELET # BLD AUTO: 183 K/UL — SIGNIFICANT CHANGE UP (ref 150–400)
POTASSIUM SERPL-MCNC: 4.6 MMOL/L — SIGNIFICANT CHANGE UP (ref 3.5–5.3)
POTASSIUM SERPL-SCNC: 4.6 MMOL/L — SIGNIFICANT CHANGE UP (ref 3.5–5.3)
RBC # BLD: 3.26 M/UL — LOW (ref 3.8–5.2)
RBC # FLD: 13.7 % — SIGNIFICANT CHANGE UP (ref 11–15)
SODIUM SERPL-SCNC: 141 MMOL/L — SIGNIFICANT CHANGE UP (ref 135–145)
TROPONIN I SERPL-MCNC: 1.44 NG/ML — HIGH (ref 0.01–0.04)
WBC # BLD: 4.6 K/UL — SIGNIFICANT CHANGE UP (ref 3.8–10.5)
WBC # FLD AUTO: 4.6 K/UL — SIGNIFICANT CHANGE UP (ref 3.8–10.5)

## 2017-03-18 RX ORDER — MAGNESIUM HYDROXIDE 400 MG/1
30 TABLET, CHEWABLE ORAL ONCE
Qty: 0 | Refills: 0 | Status: DISCONTINUED | OUTPATIENT
Start: 2017-03-18 | End: 2017-03-18

## 2017-03-18 RX ORDER — ENOXAPARIN SODIUM 100 MG/ML
60 INJECTION SUBCUTANEOUS DAILY
Qty: 0 | Refills: 0 | Status: DISCONTINUED | OUTPATIENT
Start: 2017-03-18 | End: 2017-03-18

## 2017-03-18 RX ORDER — NITROGLYCERIN 6.5 MG
2 CAPSULE, EXTENDED RELEASE ORAL EVERY 6 HOURS
Qty: 0 | Refills: 0 | Status: DISCONTINUED | OUTPATIENT
Start: 2017-03-18 | End: 2017-03-18

## 2017-03-18 RX ORDER — HYDRALAZINE HCL 50 MG
25 TABLET ORAL ONCE
Qty: 0 | Refills: 0 | Status: COMPLETED | OUTPATIENT
Start: 2017-03-18 | End: 2017-03-18

## 2017-03-18 RX ORDER — ENOXAPARIN SODIUM 100 MG/ML
30 INJECTION SUBCUTANEOUS ONCE
Qty: 0 | Refills: 0 | Status: COMPLETED | OUTPATIENT
Start: 2017-03-18 | End: 2017-03-18

## 2017-03-18 RX ADMIN — SODIUM CHLORIDE 60 MILLILITER(S): 9 INJECTION INTRAMUSCULAR; INTRAVENOUS; SUBCUTANEOUS at 06:37

## 2017-03-18 RX ADMIN — MORPHINE SULFATE 2 MILLIGRAM(S): 50 CAPSULE, EXTENDED RELEASE ORAL at 17:16

## 2017-03-18 RX ADMIN — PANTOPRAZOLE SODIUM 40 MILLIGRAM(S): 20 TABLET, DELAYED RELEASE ORAL at 17:02

## 2017-03-18 RX ADMIN — CEFTRIAXONE 100 GRAM(S): 500 INJECTION, POWDER, FOR SOLUTION INTRAMUSCULAR; INTRAVENOUS at 01:26

## 2017-03-18 RX ADMIN — ENOXAPARIN SODIUM 30 MILLIGRAM(S): 100 INJECTION SUBCUTANEOUS at 12:01

## 2017-03-18 RX ADMIN — PANTOPRAZOLE SODIUM 40 MILLIGRAM(S): 20 TABLET, DELAYED RELEASE ORAL at 06:24

## 2017-03-18 RX ADMIN — MORPHINE SULFATE 2 MILLIGRAM(S): 50 CAPSULE, EXTENDED RELEASE ORAL at 17:01

## 2017-03-18 RX ADMIN — INSULIN GLARGINE 12 UNIT(S): 100 INJECTION, SOLUTION SUBCUTANEOUS at 12:00

## 2017-03-18 RX ADMIN — Medication 0.4 MILLIGRAM(S): at 12:22

## 2017-03-18 RX ADMIN — Medication 2 INCH(S): at 14:01

## 2017-03-18 RX ADMIN — CLOPIDOGREL BISULFATE 75 MILLIGRAM(S): 75 TABLET, FILM COATED ORAL at 11:59

## 2017-03-18 RX ADMIN — ENOXAPARIN SODIUM 30 MILLIGRAM(S): 100 INJECTION SUBCUTANEOUS at 17:01

## 2017-03-18 RX ADMIN — Medication 25 MILLIGRAM(S): at 18:05

## 2017-03-18 RX ADMIN — Medication 0.4 MILLIGRAM(S): at 12:00

## 2017-03-18 RX ADMIN — Medication 325 MILLIGRAM(S): at 11:59

## 2017-03-18 RX ADMIN — Medication 25 MILLIGRAM(S): at 17:01

## 2017-03-18 RX ADMIN — Medication 25 MILLIGRAM(S): at 06:24

## 2017-03-18 NOTE — PROGRESS NOTE ADULT - SUBJECTIVE AND OBJECTIVE BOX
PATIENT DECLARES HERSELF WITH NSTEMI  NO CHANGES IN EXAM  TRANSFER ARRANGED FOR CARDIAC CATH  ALL ORDERS REVIEWED

## 2017-03-18 NOTE — PROGRESS NOTE ADULT - SUBJECTIVE AND OBJECTIVE BOX
Still LUQ vs L CP ? no SOB. Pain not related to meals.  BS controlled  VSS  PE stable  NST on Mn  PT daily

## 2017-03-18 NOTE — CHART NOTE - NSCHARTNOTEFT_GEN_A_CORE
House PA Note:    Called by RN to evaluate patient for chest pain.  Patient reports L sided chest pressure, states it feels like rubber band under breast.  States its 8/10 severity, non-radiating.  Denies associated dizziness, SOB, palpitations, abdominal pain, N/V.  Patient already received SL nitro x 2 with minimal relief.  EKG shows SR @ 72 bpm with 1st degree AVD, LAD, RBBB, lateral T wave inversions (unchanged from previous EKG on 3/16/17).  Vitals - BP /, HR , RR , T 97, O2sat 99%.  Telemetry monitoring shows Sinus elodia 50s, 1st degree AVB, PVCs.  On exam:    General: patient lying comfortably in bed  Lungs: CTAB  Cardiac: Clear S1, S2  Chest: + reproducible CP under L breast  Abdomen: Soft, NT  Ext: No edema    Labs: Trops (3/16/17) .664, .618, .628    A/P:  Chest Pain  -Stat CE ordered  -Will give 3rd SL Nitro  -Trial of MOM  -Will consider NSAID if no improvement with above  -Will f/u with Dr Walker & Dr Lerner  -Cont to monitor patient

## 2017-03-21 DIAGNOSIS — Z79.82 LONG TERM (CURRENT) USE OF ASPIRIN: ICD-10-CM

## 2017-03-21 DIAGNOSIS — Z90.710 ACQUIRED ABSENCE OF BOTH CERVIX AND UTERUS: ICD-10-CM

## 2017-03-21 DIAGNOSIS — I45.2 BIFASCICULAR BLOCK: ICD-10-CM

## 2017-03-21 DIAGNOSIS — R39.2 EXTRARENAL UREMIA: ICD-10-CM

## 2017-03-21 DIAGNOSIS — I21.4 NON-ST ELEVATION (NSTEMI) MYOCARDIAL INFARCTION: ICD-10-CM

## 2017-03-21 DIAGNOSIS — E11.319 TYPE 2 DIABETES MELLITUS WITH UNSPECIFIED DIABETIC RETINOPATHY WITHOUT MACULAR EDEMA: ICD-10-CM

## 2017-03-21 DIAGNOSIS — I12.9 HYPERTENSIVE CHRONIC KIDNEY DISEASE WITH STAGE 1 THROUGH STAGE 4 CHRONIC KIDNEY DISEASE, OR UNSPECIFIED CHRONIC KIDNEY DISEASE: ICD-10-CM

## 2017-03-21 DIAGNOSIS — N39.0 URINARY TRACT INFECTION, SITE NOT SPECIFIED: ICD-10-CM

## 2017-03-21 DIAGNOSIS — N17.9 ACUTE KIDNEY FAILURE, UNSPECIFIED: ICD-10-CM

## 2017-03-21 DIAGNOSIS — N18.3 CHRONIC KIDNEY DISEASE, STAGE 3 (MODERATE): ICD-10-CM

## 2017-03-21 DIAGNOSIS — Z90.49 ACQUIRED ABSENCE OF OTHER SPECIFIED PARTS OF DIGESTIVE TRACT: ICD-10-CM

## 2017-03-21 DIAGNOSIS — I73.9 PERIPHERAL VASCULAR DISEASE, UNSPECIFIED: ICD-10-CM

## 2017-03-21 DIAGNOSIS — K27.9 PEPTIC ULCER, SITE UNSPECIFIED, UNSPECIFIED AS ACUTE OR CHRONIC, WITHOUT HEMORRHAGE OR PERFORATION: ICD-10-CM

## 2017-03-21 DIAGNOSIS — E11.9 TYPE 2 DIABETES MELLITUS WITHOUT COMPLICATIONS: ICD-10-CM

## 2017-03-21 DIAGNOSIS — I25.9 CHRONIC ISCHEMIC HEART DISEASE, UNSPECIFIED: ICD-10-CM

## 2017-03-21 DIAGNOSIS — E11.40 TYPE 2 DIABETES MELLITUS WITH DIABETIC NEUROPATHY, UNSPECIFIED: ICD-10-CM

## 2017-03-21 DIAGNOSIS — B95.8 UNSPECIFIED STAPHYLOCOCCUS AS THE CAUSE OF DISEASES CLASSIFIED ELSEWHERE: ICD-10-CM

## 2017-03-21 DIAGNOSIS — Z88.5 ALLERGY STATUS TO NARCOTIC AGENT: ICD-10-CM

## 2017-03-21 DIAGNOSIS — Z89.439 ACQUIRED ABSENCE OF UNSPECIFIED FOOT: ICD-10-CM

## 2017-03-21 LAB
CULTURE RESULTS: SIGNIFICANT CHANGE UP
CULTURE RESULTS: SIGNIFICANT CHANGE UP
SPECIMEN SOURCE: SIGNIFICANT CHANGE UP
SPECIMEN SOURCE: SIGNIFICANT CHANGE UP

## 2017-05-11 ENCOUNTER — INPATIENT (INPATIENT)
Facility: HOSPITAL | Age: 82
LOS: 2 days | Discharge: HOME | End: 2017-05-14
Attending: FAMILY MEDICINE | Admitting: FAMILY MEDICINE
Payer: MEDICARE

## 2017-05-11 VITALS
WEIGHT: 151.9 LBS | HEART RATE: 69 BPM | SYSTOLIC BLOOD PRESSURE: 156 MMHG | TEMPERATURE: 99 F | OXYGEN SATURATION: 99 % | DIASTOLIC BLOOD PRESSURE: 61 MMHG | HEIGHT: 59 IN | RESPIRATION RATE: 20 BRPM

## 2017-05-11 LAB
ALBUMIN SERPL ELPH-MCNC: 3.2 G/DL — LOW (ref 3.3–5)
ALP SERPL-CCNC: 93 U/L — SIGNIFICANT CHANGE UP (ref 40–120)
ALT FLD-CCNC: 17 U/L — SIGNIFICANT CHANGE UP (ref 12–78)
ANION GAP SERPL CALC-SCNC: 12 MMOL/L — SIGNIFICANT CHANGE UP (ref 5–17)
APTT BLD: 30.1 SEC — SIGNIFICANT CHANGE UP (ref 27.5–37.4)
AST SERPL-CCNC: 18 U/L — SIGNIFICANT CHANGE UP (ref 15–37)
BILIRUB SERPL-MCNC: 0.3 MG/DL — SIGNIFICANT CHANGE UP (ref 0.2–1.2)
BUN SERPL-MCNC: 40 MG/DL — HIGH (ref 7–23)
CALCIUM SERPL-MCNC: 9.3 MG/DL — SIGNIFICANT CHANGE UP (ref 8.5–10.1)
CHLORIDE SERPL-SCNC: 89 MMOL/L — LOW (ref 96–108)
CK MB BLD-MCNC: 1.4 % — SIGNIFICANT CHANGE UP (ref 0–3.5)
CK MB CFR SERPL CALC: 1.3 NG/ML — SIGNIFICANT CHANGE UP (ref 0.5–3.6)
CK SERPL-CCNC: 94 U/L — SIGNIFICANT CHANGE UP (ref 26–192)
CO2 SERPL-SCNC: 22 MMOL/L — SIGNIFICANT CHANGE UP (ref 22–31)
CREAT SERPL-MCNC: 1.78 MG/DL — HIGH (ref 0.5–1.3)
GLUCOSE SERPL-MCNC: 104 MG/DL — HIGH (ref 70–99)
HCT VFR BLD CALC: 31.2 % — LOW (ref 34.5–45)
HGB BLD-MCNC: 10.7 G/DL — LOW (ref 11.5–15.5)
INR BLD: 1.04 RATIO — SIGNIFICANT CHANGE UP (ref 0.88–1.16)
LYMPHOCYTES # BLD AUTO: 22 % — SIGNIFICANT CHANGE UP (ref 13–44)
MACROCYTES BLD QL: SLIGHT — SIGNIFICANT CHANGE UP
MCHC RBC-ENTMCNC: 28.3 PG — SIGNIFICANT CHANGE UP (ref 27–34)
MCHC RBC-ENTMCNC: 34.2 GM/DL — SIGNIFICANT CHANGE UP (ref 32–36)
MCV RBC AUTO: 82.7 FL — SIGNIFICANT CHANGE UP (ref 80–100)
MONOCYTES NFR BLD AUTO: 7 % — SIGNIFICANT CHANGE UP (ref 2–14)
NEUTROPHILS NFR BLD AUTO: 71 % — SIGNIFICANT CHANGE UP (ref 43–77)
PLAT MORPH BLD: NORMAL — SIGNIFICANT CHANGE UP
PLATELET # BLD AUTO: 285 K/UL — SIGNIFICANT CHANGE UP (ref 150–400)
POIKILOCYTOSIS BLD QL AUTO: SLIGHT — SIGNIFICANT CHANGE UP
POLYCHROMASIA BLD QL SMEAR: SLIGHT — SIGNIFICANT CHANGE UP
POTASSIUM SERPL-MCNC: 4.9 MMOL/L — SIGNIFICANT CHANGE UP (ref 3.5–5.3)
POTASSIUM SERPL-SCNC: 4.9 MMOL/L — SIGNIFICANT CHANGE UP (ref 3.5–5.3)
PROT SERPL-MCNC: 8.9 GM/DL — HIGH (ref 6–8.3)
PROTHROM AB SERPL-ACNC: 11.3 SEC — SIGNIFICANT CHANGE UP (ref 9.8–12.7)
RBC # BLD: 3.78 M/UL — LOW (ref 3.8–5.2)
RBC # FLD: 12.2 % — SIGNIFICANT CHANGE UP (ref 11–15)
RBC BLD AUTO: SIGNIFICANT CHANGE UP
SODIUM SERPL-SCNC: 123 MMOL/L — LOW (ref 135–145)
TROPONIN I SERPL-MCNC: 0.73 NG/ML — HIGH (ref 0.01–0.04)
WBC # BLD: 2.5 K/UL — LOW (ref 3.8–10.5)
WBC # FLD AUTO: 2.5 K/UL — LOW (ref 3.8–10.5)

## 2017-05-11 PROCEDURE — 99285 EMERGENCY DEPT VISIT HI MDM: CPT

## 2017-05-11 PROCEDURE — 70450 CT HEAD/BRAIN W/O DYE: CPT | Mod: 26

## 2017-05-11 PROCEDURE — 71010: CPT | Mod: 26

## 2017-05-11 RX ORDER — ASPIRIN/CALCIUM CARB/MAGNESIUM 324 MG
325 TABLET ORAL ONCE
Qty: 0 | Refills: 0 | Status: COMPLETED | OUTPATIENT
Start: 2017-05-11 | End: 2017-05-11

## 2017-05-11 RX ORDER — SODIUM CHLORIDE 9 MG/ML
1000 INJECTION INTRAMUSCULAR; INTRAVENOUS; SUBCUTANEOUS
Qty: 0 | Refills: 0 | Status: DISCONTINUED | OUTPATIENT
Start: 2017-05-11 | End: 2017-05-13

## 2017-05-11 RX ORDER — ONDANSETRON 8 MG/1
4 TABLET, FILM COATED ORAL ONCE
Qty: 0 | Refills: 0 | Status: COMPLETED | OUTPATIENT
Start: 2017-05-11 | End: 2017-05-11

## 2017-05-11 RX ADMIN — ONDANSETRON 4 MILLIGRAM(S): 8 TABLET, FILM COATED ORAL at 21:59

## 2017-05-11 NOTE — ED ADULT NURSE NOTE - OBJECTIVE STATEMENT
pt c/o dizziness, nausea and vomitting x 2 day with thick mucus, lower back pain. pt c/o dizziness, nausea and vomitting x 2 day with thick mucus, lower back pain. pt legally blind in the right eye.

## 2017-05-11 NOTE — ED PROVIDER NOTE - PHYSICAL EXAMINATION
Gen: Alert, speaking in complete sentences;  Head: NC, AT, EOMI, s/p cataract surg, normal lids/conjunctiva;  ENT: normal hearing, patent oropharynx, MMM;  Neck: supple, no tenderness/meningismus/JVD, Trachea midline, FROM;  Pulm: Bilateral clear BS, normal resp effort, no wheeze/stridor/retractions;  CV: RRR, no M/R/G, +dist pulses;  Abd: soft, NT/ND, +BS, no guarding/rebound tenderness;  Mskel: no edema/erythema/cyanosis;  Skin: no rash;  Neuro: AAOx3, no sensory/motor deficits, CN 2-12 intact

## 2017-05-11 NOTE — ED PROVIDER NOTE - OBJECTIVE STATEMENT
Pertinent PMH/PSH/FHx/SHx and Review of Systems contained within:    85yo F w PMH of DM, HTN, recent NSTEMI, PVD s/p L foot surgery, s/p appendectomy & hysterectomy, s/p cataract surgery presents to ED c/o dizziness & nausea.  Pt states sx started yesterday.  Denies CP, SOB, abd pain, diarrhea, recent travel, ingestion of contaminated foods, syncope, vertigo.  +photophobia.  PMD- Denise    No fever/chills, No photophobia/eye pain/changes in vision, No ear pain/sore throat/dysphagia, No chest pain/palpitations, no SOB/cough/wheeze/stridor, No abdominal pain, no dysuria/frequency/discharge, No neck/back pain, no rash

## 2017-05-11 NOTE — ED PROVIDER NOTE - CARE PLAN
Principal Discharge DX:	Nausea  Secondary Diagnosis:	Hyponatremia  Secondary Diagnosis:	Elevated troponin I level

## 2017-05-11 NOTE — ED PROVIDER NOTE - MEDICAL DECISION MAKING DETAILS
Pt w above dx, given ASA in ED.  CT head neg for acute pathology.  Will replete sodium and admit to tele.  No EKG changes on repeat.  Admitted for further eval/mgmt.

## 2017-05-11 NOTE — ED PROVIDER NOTE - INTERPRETATION
sinus, rate 76, RBBB, LAFB, Tinv V1-4 [previous EKG documentation 3/18/17 "SR @ 72 bpm with 1st degree AVD, LAD, RBBB, lateral T wave inversions]

## 2017-05-11 NOTE — ED ADULT TRIAGE NOTE - CHIEF COMPLAINT QUOTE
PT STATES, " I have been feeling nauseous and bringing up thick sputum all day, I feel weak " pt denies abdominal pain and diarrhea. right eye visually impaired

## 2017-05-12 LAB
ANION GAP SERPL CALC-SCNC: 9 MMOL/L — SIGNIFICANT CHANGE UP (ref 5–17)
APPEARANCE UR: ABNORMAL
BACTERIA # UR AUTO: ABNORMAL
BILIRUB UR-MCNC: NEGATIVE — SIGNIFICANT CHANGE UP
BUN SERPL-MCNC: 38 MG/DL — HIGH (ref 7–23)
CALCIUM SERPL-MCNC: 8.9 MG/DL — SIGNIFICANT CHANGE UP (ref 8.5–10.1)
CHLORIDE SERPL-SCNC: 96 MMOL/L — SIGNIFICANT CHANGE UP (ref 96–108)
CK MB CFR SERPL CALC: 1.2 NG/ML — SIGNIFICANT CHANGE UP (ref 0.5–3.6)
CK MB CFR SERPL CALC: 1.3 NG/ML — SIGNIFICANT CHANGE UP (ref 0.5–3.6)
CO2 SERPL-SCNC: 23 MMOL/L — SIGNIFICANT CHANGE UP (ref 22–31)
COLOR SPEC: YELLOW — SIGNIFICANT CHANGE UP
CREAT SERPL-MCNC: 1.81 MG/DL — HIGH (ref 0.5–1.3)
DIFF PNL FLD: ABNORMAL
EPI CELLS # UR: SIGNIFICANT CHANGE UP
GLUCOSE SERPL-MCNC: 88 MG/DL — SIGNIFICANT CHANGE UP (ref 70–99)
GLUCOSE UR QL: NEGATIVE MG/DL — SIGNIFICANT CHANGE UP
KETONES UR-MCNC: NEGATIVE — SIGNIFICANT CHANGE UP
LEUKOCYTE ESTERASE UR-ACNC: ABNORMAL
NITRITE UR-MCNC: NEGATIVE — SIGNIFICANT CHANGE UP
PH UR: 6 — SIGNIFICANT CHANGE UP (ref 5–8)
POTASSIUM SERPL-MCNC: 4.4 MMOL/L — SIGNIFICANT CHANGE UP (ref 3.5–5.3)
POTASSIUM SERPL-SCNC: 4.4 MMOL/L — SIGNIFICANT CHANGE UP (ref 3.5–5.3)
PROT UR-MCNC: 100 MG/DL
RBC CASTS # UR COMP ASSIST: ABNORMAL /HPF (ref 0–4)
SODIUM SERPL-SCNC: 128 MMOL/L — LOW (ref 135–145)
SP GR SPEC: 1 — LOW (ref 1.01–1.02)
TROPONIN I SERPL-MCNC: 0.68 NG/ML — HIGH (ref 0.01–0.04)
TROPONIN I SERPL-MCNC: 0.68 NG/ML — HIGH (ref 0.01–0.04)
UROBILINOGEN FLD QL: NEGATIVE MG/DL — SIGNIFICANT CHANGE UP
WBC UR QL: ABNORMAL

## 2017-05-12 RX ORDER — METOPROLOL TARTRATE 50 MG
25 TABLET ORAL EVERY 12 HOURS
Qty: 0 | Refills: 0 | Status: DISCONTINUED | OUTPATIENT
Start: 2017-05-12 | End: 2017-05-14

## 2017-05-12 RX ORDER — DEXTROSE 50 % IN WATER 50 %
25 SYRINGE (ML) INTRAVENOUS ONCE
Qty: 0 | Refills: 0 | Status: DISCONTINUED | OUTPATIENT
Start: 2017-05-12 | End: 2017-05-14

## 2017-05-12 RX ORDER — TICAGRELOR 90 MG/1
90 TABLET ORAL
Qty: 0 | Refills: 0 | Status: DISCONTINUED | OUTPATIENT
Start: 2017-05-12 | End: 2017-05-14

## 2017-05-12 RX ORDER — FUROSEMIDE 40 MG
40 TABLET ORAL DAILY
Qty: 0 | Refills: 0 | Status: DISCONTINUED | OUTPATIENT
Start: 2017-05-12 | End: 2017-05-12

## 2017-05-12 RX ORDER — AMLODIPINE BESYLATE 2.5 MG/1
10 TABLET ORAL DAILY
Qty: 0 | Refills: 0 | Status: DISCONTINUED | OUTPATIENT
Start: 2017-05-12 | End: 2017-05-14

## 2017-05-12 RX ORDER — DEXTROSE 50 % IN WATER 50 %
12.5 SYRINGE (ML) INTRAVENOUS ONCE
Qty: 0 | Refills: 0 | Status: DISCONTINUED | OUTPATIENT
Start: 2017-05-12 | End: 2017-05-14

## 2017-05-12 RX ORDER — GLUCAGON INJECTION, SOLUTION 0.5 MG/.1ML
1 INJECTION, SOLUTION SUBCUTANEOUS ONCE
Qty: 0 | Refills: 0 | Status: DISCONTINUED | OUTPATIENT
Start: 2017-05-12 | End: 2017-05-14

## 2017-05-12 RX ORDER — ATORVASTATIN CALCIUM 80 MG/1
20 TABLET, FILM COATED ORAL AT BEDTIME
Qty: 0 | Refills: 0 | Status: DISCONTINUED | OUTPATIENT
Start: 2017-05-12 | End: 2017-05-14

## 2017-05-12 RX ORDER — SODIUM BICARBONATE 1 MEQ/ML
650 SYRINGE (ML) INTRAVENOUS DAILY
Qty: 0 | Refills: 0 | Status: DISCONTINUED | OUTPATIENT
Start: 2017-05-12 | End: 2017-05-12

## 2017-05-12 RX ORDER — PANTOPRAZOLE SODIUM 20 MG/1
40 TABLET, DELAYED RELEASE ORAL
Qty: 0 | Refills: 0 | Status: DISCONTINUED | OUTPATIENT
Start: 2017-05-12 | End: 2017-05-14

## 2017-05-12 RX ORDER — INSULIN GLARGINE 100 [IU]/ML
10 INJECTION, SOLUTION SUBCUTANEOUS AT BEDTIME
Qty: 0 | Refills: 0 | Status: DISCONTINUED | OUTPATIENT
Start: 2017-05-12 | End: 2017-05-14

## 2017-05-12 RX ORDER — ENOXAPARIN SODIUM 100 MG/ML
30 INJECTION SUBCUTANEOUS DAILY
Qty: 0 | Refills: 0 | Status: DISCONTINUED | OUTPATIENT
Start: 2017-05-12 | End: 2017-05-14

## 2017-05-12 RX ORDER — HYDRALAZINE HCL 50 MG
50 TABLET ORAL
Qty: 0 | Refills: 0 | Status: DISCONTINUED | OUTPATIENT
Start: 2017-05-12 | End: 2017-05-14

## 2017-05-12 RX ORDER — SODIUM CHLORIDE 9 MG/ML
1000 INJECTION, SOLUTION INTRAVENOUS
Qty: 0 | Refills: 0 | Status: DISCONTINUED | OUTPATIENT
Start: 2017-05-12 | End: 2017-05-14

## 2017-05-12 RX ORDER — DEXTROSE 50 % IN WATER 50 %
1 SYRINGE (ML) INTRAVENOUS ONCE
Qty: 0 | Refills: 0 | Status: DISCONTINUED | OUTPATIENT
Start: 2017-05-12 | End: 2017-05-14

## 2017-05-12 RX ORDER — INSULIN LISPRO 100/ML
VIAL (ML) SUBCUTANEOUS
Qty: 0 | Refills: 0 | Status: DISCONTINUED | OUTPATIENT
Start: 2017-05-12 | End: 2017-05-14

## 2017-05-12 RX ADMIN — ATORVASTATIN CALCIUM 20 MILLIGRAM(S): 80 TABLET, FILM COATED ORAL at 22:09

## 2017-05-12 RX ADMIN — PANTOPRAZOLE SODIUM 40 MILLIGRAM(S): 20 TABLET, DELAYED RELEASE ORAL at 06:35

## 2017-05-12 RX ADMIN — Medication 50 MILLIGRAM(S): at 12:28

## 2017-05-12 RX ADMIN — Medication 25 MILLIGRAM(S): at 17:12

## 2017-05-12 RX ADMIN — Medication 325 MILLIGRAM(S): at 00:14

## 2017-05-12 RX ADMIN — Medication 25 MILLIGRAM(S): at 06:34

## 2017-05-12 RX ADMIN — Medication 50 MILLIGRAM(S): at 17:12

## 2017-05-12 RX ADMIN — SODIUM CHLORIDE 200 MILLILITER(S): 9 INJECTION INTRAMUSCULAR; INTRAVENOUS; SUBCUTANEOUS at 00:14

## 2017-05-12 RX ADMIN — AMLODIPINE BESYLATE 10 MILLIGRAM(S): 2.5 TABLET ORAL at 06:33

## 2017-05-12 RX ADMIN — Medication 2: at 11:14

## 2017-05-12 RX ADMIN — TICAGRELOR 90 MILLIGRAM(S): 90 TABLET ORAL at 17:12

## 2017-05-12 RX ADMIN — ENOXAPARIN SODIUM 30 MILLIGRAM(S): 100 INJECTION SUBCUTANEOUS at 12:28

## 2017-05-12 RX ADMIN — Medication 50 MILLIGRAM(S): at 06:33

## 2017-05-12 RX ADMIN — INSULIN GLARGINE 10 UNIT(S): 100 INJECTION, SOLUTION SUBCUTANEOUS at 23:00

## 2017-05-12 RX ADMIN — SODIUM CHLORIDE 200 MILLILITER(S): 9 INJECTION INTRAMUSCULAR; INTRAVENOUS; SUBCUTANEOUS at 17:12

## 2017-05-12 RX ADMIN — Medication 40 MILLIGRAM(S): at 06:33

## 2017-05-12 RX ADMIN — TICAGRELOR 90 MILLIGRAM(S): 90 TABLET ORAL at 06:34

## 2017-05-12 NOTE — H&P ADULT. - HISTORY OF PRESENT ILLNESS
83yo F w PMH of DM, HTN, recent NSTEMI, PVD s/p L foot surgery, s/p appendectomy & hysterectomy, s/p cataract surgery presents to ED c/o dizziness & nausea.  Pt states sx started yesterday.  Denies CP, SOB, abd pain, diarrhea, recent travel, ingestion of contaminated foods, syncope, vertigo.  +photophobia.

## 2017-05-12 NOTE — PHYSICAL THERAPY INITIAL EVALUATION ADULT - PLANNED THERAPY INTERVENTIONS, PT EVAL
transfer training/stair negotiation/bed mobility training/balance training/strengthening/gait training

## 2017-05-12 NOTE — PHYSICAL THERAPY INITIAL EVALUATION ADULT - PERTINENT HX OF CURRENT PROBLEM, REHAB EVAL
Pt is an 85yo F admitted secondary to dizziness, nausea, vomiting, & photophobia. Elevated troponins upon admission. Work-up negative,

## 2017-05-12 NOTE — PHYSICAL THERAPY INITIAL EVALUATION ADULT - ADDITIONAL COMMENTS
Pt lives alone in a private two story home, 4 entry steps (+ rail), 12 steps to 2nd level (+ rail). Prior to admission pt performs all functional mobility including ADL's independently. Pt states she does not use an assistive device for household ambulation but endorses to using a straight cane for community ambulation. Pt is right hand dominant, is blind in her R eye, has impaired vision in her L eye, & is a retired nurse. Pt s/p R TMA approximately 15 years ago (RLE residual edema, baseline per pt report). Pt reports her goal of therapy is to return home, she does not want to go to rehab.

## 2017-05-12 NOTE — PHYSICAL THERAPY INITIAL EVALUATION ADULT - MODIFIED CLINICAL TEST OF SENSORY INTEGRATION IN BALANCE TEST
Barthel Index: Feeding Score _10/10__, Bathing Score _5/5__, Grooming Score _5/5__, Dressing Score _10/10__, Bowels Score __10/10_, Bladder Score _10/10__, Toilet Score _5/10__, Transfers Score _5/15__, Mobility Score __0/15_, Stairs Score _0/10__,     Total Score ___60/100

## 2017-05-12 NOTE — H&P ADULT. - ASSESSMENT
r/o MI CAD  ARF hyponatremia  T2DM  Peripheral angiopathy, neuropathy  gentle NS  Furosemide   ua urine osmolarity  nephrology  cardiology  ASA brilinta  B blocker

## 2017-05-12 NOTE — CONSULT NOTE ADULT - SUBJECTIVE AND OBJECTIVE BOX
Patient is a 84y old  Female who presents with a chief complaint of nausea vomiting weakness (12 May 2017 00:00)    HPI:  83yo F w PMH of DM, HTN, recent NSTEMI, PVD s/p L foot surgery, s/p appendectomy & hysterectomy, s/p cataract surgery presents to ED c/o dizziness & nausea.  Pt states sx started yesterday.  Denies CP, SOB, abd pain, diarrhea, recent travel, ingestion of contaminated foods, syncope, vertigo.  +photophobia. (12 May 2017 00:00)    Noted Na 123; recently at [facility]; had developed hyponatremia then as well.    PAST MEDICAL & SURGICAL HISTORY:  CAD S/P percutaneous coronary angioplasty  PVD (peripheral vascular disease)  HTN (hypertension)  DM (diabetes mellitus screen)  S/P foot surgery: left foot surgery with sage s/p fx.  S/P cataract surgery  S/P appendectomy  S/P hysterectomy  Amputation foot, unilat    FAMILY HISTORY:  No pertinent family history in first degree relatives    codeine (Other)    MEDICATIONS  (STANDING):  sodium chloride 0.9%. 1000milliLiter(s) IV Continuous <Continuous>  sodium bicarbonate   Oral Tab/Cap - Peds 650milliGRAM(s) Oral daily  insulin glargine Injectable (LANTUS) 10Unit(s) SubCutaneous at bedtime  atorvastatin 20milliGRAM(s) Oral at bedtime  ticagrelor 90milliGRAM(s) Oral two times a day  metoprolol 25milliGRAM(s) Oral every 12 hours  amLODIPine   Tablet 10milliGRAM(s) Oral daily  furosemide    Tablet 40milliGRAM(s) Oral daily  hydrALAZINE 50milliGRAM(s) Oral four times a day  insulin lispro (HumaLOG) corrective regimen sliding scale  SubCutaneous three times a day before meals  dextrose 5%. 1000milliLiter(s) IV Continuous <Continuous>  dextrose 50% Injectable 12.5Gram(s) IV Push once  dextrose 50% Injectable 25Gram(s) IV Push once  dextrose 50% Injectable 25Gram(s) IV Push once  enoxaparin Injectable 30milliGRAM(s) SubCutaneous daily  pantoprazole    Tablet 40milliGRAM(s) Oral before breakfast    MEDICATIONS  (PRN):  dextrose Gel 1Dose(s) Oral once PRN Blood Glucose LESS THAN 70 milliGRAM(s)/deciliter  glucagon  Injectable 1milliGRAM(s) IntraMuscular once PRN Glucose LESS THAN 70 milligrams/deciliter  bisacodyl Suppository 10milliGRAM(s) Rectal daily PRN Constipation    Vital Signs Last 24 Hrs  T(C): 36.6, Max: 37.2 ( @ 16:34)  T(F): 97.8, Max: 98.9 ( @ 16:34)  HR: 60 (60 - 79)  BP: 146/62 (143/84 - 160/72)  BP(mean): --  RR: 18 (11 - 20)  SpO2: 98% (98% - 99%)    CAPILLARY BLOOD GLUCOSE  101 (12 May 2017 08:22)    PHYSICAL EXAM:      T(C): 36.6, Max: 37.2 ( @ 16:34)  HR: 60 (60 - 79)  BP: 146/62 (143/84 - 160/72)  RR: 18 (11 - 20)  SpO2: 98% (98% - 99%)  Wt(kg): --  Respiratory: clear anteriorly, decreased BS at bases  Cardiovascular: S1 S2  Gastrointestinal: soft NT ND +BS  Extremities:  R with TMA > L edema unchanged as per pt                  123<L>  |  89<L>  |  40<H>  ----------------------------<  104<H>  4.9   |  22  |  1.78<H>    Ca    9.3      11 May 2017 21:16    TPro  8.9<H>  /  Alb  3.2<L>  /  TBili  0.3  /  DBili  x   /  AST  18  /  ALT  17  /  AlkPhos  93                            10.7   2.5   )-----------( 285      ( 11 May 2017 21:16 )             31.2     Urinalysis Basic - ( 12 May 2017 00:26 )    Color: Yellow / Appearance: Slightly Turbid / S.005 / pH: x  Gluc: x / Ketone: Negative  / Bili: Negative / Urobili: Negative mg/dL   Blood: x / Protein: 100 mg/dL / Nitrite: Negative   Leuk Esterase: Moderate / RBC: 25-50 /HPF / WBC 26-50   Sq Epi: x / Non Sq Epi: Occasional / Bacteria: Many      Assessment and Plan    MAXIMINO, CKD 3; hyponatremia with poor po intake, nausea; elevated ADH state.  Repeat basic panel stat.  Follow routine TFTs;   Will follow.

## 2017-05-12 NOTE — H&P ADULT. - PMH
CAD S/P percutaneous coronary angioplasty    DM (diabetes mellitus screen)    HTN (hypertension)    PVD (peripheral vascular disease)

## 2017-05-12 NOTE — CONSULT NOTE ADULT - SUBJECTIVE AND OBJECTIVE BOX
MEDICAL RECORD REVIEWED; HISTORY AND  REVIEW OF SYSTEMS OBTAINED; PATIENT EXAMINED:    84 YEAR OLD CAROLE FEMALE WITH SEVERAL HOURS OF NAUSEA N D FOUND TO BE HYPONATRMEMIC WITH WORSENING CHRONIC RENAL DYSFUNCTION; NO CHEST PAIN, DYSPNEA, DIZZINESS OR DIAPHORESIS; DIABETIC, CKD, ASHD WITH RECENT NSTEMI/PCI IN 3/2017; NORMAL LV FUNCTION; TROPONINS ARE IN EWUIVOCAL RANGE; ECG: NSR BORDERLINE FIRST DEGREE AV BLOCK, BIFASICULAR BLOCK:RBBB/LAFB, KIANNA SEPTAL Q WAVES; ALL LABS/RADIOLOGY/MEDICATIONS REVIEWED; CHEST XRAY: ELEVATEDD LEFT HEMIDIAGPHRAGM OTHERWISE UNREMARKABLE; ON EXAM: NO JVD , CLEAR LUNGS, REGULAR RATE_&_RHYTHM;NORMAL_S1&S2_NO_SIGNIFICANT_MURMURS,RUBS,OR_GALLOPS. SOFT ABDOMEN, NO CLUBBING CYANOSIS OR EDEMA,NORMAL MENTATION    IMPRESSION:    NAUSEA MOST LIKELY SECONDARY TO HYPONATREMIA  ASHD  DIABETIC  CKD    AGREE WITH PRESENT MANAGEMENT: REMAINS ON DUAL ANTIPLATELET THERAPY, ACEI, BETA BLOCKER, STATIN, HYDRALAZINE;  WILL FOLLOW ; RECCOMMENDATIONS AS PER CLINICAL COURSE    DISCUSSED WITH ATTENDING    DAYO COTTON MD, FACC

## 2017-05-12 NOTE — PHYSICAL THERAPY INITIAL EVALUATION ADULT - IMPAIRMENTS FOUND, PT EVAL
muscle strength/bed mobility, transfers, stair negotiation/aerobic capacity/endurance/gait, locomotion, and balance

## 2017-05-13 LAB
ANION GAP SERPL CALC-SCNC: 8 MMOL/L — SIGNIFICANT CHANGE UP (ref 5–17)
BUN SERPL-MCNC: 32 MG/DL — HIGH (ref 7–23)
CALCIUM SERPL-MCNC: 8.2 MG/DL — LOW (ref 8.5–10.1)
CHLORIDE SERPL-SCNC: 106 MMOL/L — SIGNIFICANT CHANGE UP (ref 96–108)
CO2 SERPL-SCNC: 22 MMOL/L — SIGNIFICANT CHANGE UP (ref 22–31)
CORTIS AM PEAK SERPL-MCNC: 8 UG/DL — SIGNIFICANT CHANGE UP (ref 6–18.4)
CREAT SERPL-MCNC: 1.6 MG/DL — HIGH (ref 0.5–1.3)
GLUCOSE SERPL-MCNC: 62 MG/DL — LOW (ref 70–99)
POTASSIUM SERPL-MCNC: 4.6 MMOL/L — SIGNIFICANT CHANGE UP (ref 3.5–5.3)
POTASSIUM SERPL-SCNC: 4.6 MMOL/L — SIGNIFICANT CHANGE UP (ref 3.5–5.3)
SODIUM SERPL-SCNC: 136 MMOL/L — SIGNIFICANT CHANGE UP (ref 135–145)
T3 SERPL-MCNC: 80 NG/DL — SIGNIFICANT CHANGE UP (ref 80–200)
T4 AB SER-ACNC: 6.8 UG/DL — SIGNIFICANT CHANGE UP (ref 4.6–12)
TSH SERPL-MCNC: 2.01 UU/ML — SIGNIFICANT CHANGE UP (ref 0.36–3.74)
URATE SERPL-MCNC: 8.1 MG/DL — HIGH (ref 2.5–7)

## 2017-05-13 RX ORDER — MAGNESIUM HYDROXIDE 400 MG/1
30 TABLET, CHEWABLE ORAL ONCE
Qty: 0 | Refills: 0 | Status: COMPLETED | OUTPATIENT
Start: 2017-05-13 | End: 2017-05-13

## 2017-05-13 RX ADMIN — Medication 50 MILLIGRAM(S): at 17:26

## 2017-05-13 RX ADMIN — AMLODIPINE BESYLATE 10 MILLIGRAM(S): 2.5 TABLET ORAL at 05:28

## 2017-05-13 RX ADMIN — Medication 4: at 12:50

## 2017-05-13 RX ADMIN — Medication 50 MILLIGRAM(S): at 12:52

## 2017-05-13 RX ADMIN — Medication 25 MILLIGRAM(S): at 17:28

## 2017-05-13 RX ADMIN — MAGNESIUM HYDROXIDE 30 MILLILITER(S): 400 TABLET, CHEWABLE ORAL at 21:25

## 2017-05-13 RX ADMIN — Medication 2: at 17:25

## 2017-05-13 RX ADMIN — Medication 50 MILLIGRAM(S): at 00:13

## 2017-05-13 RX ADMIN — ATORVASTATIN CALCIUM 20 MILLIGRAM(S): 80 TABLET, FILM COATED ORAL at 21:25

## 2017-05-13 RX ADMIN — TICAGRELOR 90 MILLIGRAM(S): 90 TABLET ORAL at 05:29

## 2017-05-13 RX ADMIN — TICAGRELOR 90 MILLIGRAM(S): 90 TABLET ORAL at 17:28

## 2017-05-13 RX ADMIN — ENOXAPARIN SODIUM 30 MILLIGRAM(S): 100 INJECTION SUBCUTANEOUS at 12:51

## 2017-05-13 RX ADMIN — PANTOPRAZOLE SODIUM 40 MILLIGRAM(S): 20 TABLET, DELAYED RELEASE ORAL at 06:33

## 2017-05-13 RX ADMIN — SODIUM CHLORIDE 200 MILLILITER(S): 9 INJECTION INTRAMUSCULAR; INTRAVENOUS; SUBCUTANEOUS at 12:53

## 2017-05-13 RX ADMIN — Medication 50 MILLIGRAM(S): at 05:28

## 2017-05-13 RX ADMIN — Medication 10 MILLIGRAM(S): at 17:27

## 2017-05-13 RX ADMIN — Medication 25 MILLIGRAM(S): at 05:29

## 2017-05-13 NOTE — PROGRESS NOTE ADULT - SUBJECTIVE AND OBJECTIVE BOX
SODIUM INCREASED   TELEM: NSR WITH UNIFOCAL VPCS; NO VENT ARRYTMIA  UNREMARKABLE EXAM  TROPONINS REMAIN EQUIVOCAL    NO  ACS SUSPECTED  CONTINUING TREATMENT OF HYPONATREMIA

## 2017-05-13 NOTE — PROGRESS NOTE ADULT - SUBJECTIVE AND OBJECTIVE BOX
Patient is a 84y old  Female who presents with a chief complaint of nausea vomiting weakness (12 May 2017 00:00)      INTERVAL HPI/OVERNIGHT EVENTS:    MEDICATIONS  (STANDING):  sodium chloride 0.9%. 1000milliLiter(s) IV Continuous <Continuous>  insulin glargine Injectable (LANTUS) 10Unit(s) SubCutaneous at bedtime  atorvastatin 20milliGRAM(s) Oral at bedtime  ticagrelor 90milliGRAM(s) Oral two times a day  metoprolol 25milliGRAM(s) Oral every 12 hours  amLODIPine   Tablet 10milliGRAM(s) Oral daily  hydrALAZINE 50milliGRAM(s) Oral four times a day  insulin lispro (HumaLOG) corrective regimen sliding scale  SubCutaneous three times a day before meals  dextrose 5%. 1000milliLiter(s) IV Continuous <Continuous>  dextrose 50% Injectable 12.5Gram(s) IV Push once  dextrose 50% Injectable 25Gram(s) IV Push once  dextrose 50% Injectable 25Gram(s) IV Push once  enoxaparin Injectable 30milliGRAM(s) SubCutaneous daily  pantoprazole    Tablet 40milliGRAM(s) Oral before breakfast    MEDICATIONS  (PRN):  dextrose Gel 1Dose(s) Oral once PRN Blood Glucose LESS THAN 70 milliGRAM(s)/deciliter  glucagon  Injectable 1milliGRAM(s) IntraMuscular once PRN Glucose LESS THAN 70 milligrams/deciliter  bisacodyl Suppository 10milliGRAM(s) Rectal daily PRN Constipation      Allergies    codeine (Other)    Intolerances        REVIEW OF SYSTEMS:        HEENT - wnl  RESPIRATORY: No cough, wheezing, chills or hemoptysis; No shortness of breath  CARDIOVASCULAR: No chest pain, palpitations, dizziness, or leg swelling  GASTROINTESTINAL: No abdominal or epigastric pain. No nausea, vomiting, or hematemesis; No diarrhea or constipation. No melena or hematochezia.  GENITOURINARY: No dysuria, frequency, hematuria, or incontinence  SKIN: No itching, burning, rashes, or lesions   MUSCULOSKELETAL: No joint pain or swelling; No muscle, back, or extremity pain  PSYCHIATRIC: No depression, anxiety, mood swings, or difficulty sleeping        Vital Signs Last 24 Hrs  T(C): 36.3, Max: 37.1 (05-12 @ 16:38)  T(F): 97.4, Max: 98.8 ( @ 16:38)  HR: 60 (60 - 75)  BP: 149/40 (146/50 - 158/56)  BP(mean): --  RR: 18 (18 - 18)  SpO2: 97% (97% - 99%)    PHYSICAL EXAM:  general       HEENT wnl  CHEST/LUNG: Clear to percussion bilaterally; No rales, rhonchi, wheezing, or rubs  HEART: Regular rate and rhythm; No murmurs, rubs, or gallops  ABDOMEN: Soft, Nontender, Nondistended; Bowel sounds present  EXTREMITIES:  2+ Peripheral Pulses, No clubbing, cyanosis, or edema  LYMPH: No lymphadenopathy noted  SKIN: No rashes or lesions    LABS:                        10.7   2.5   )-----------( 285      ( 11 May 2017 21:16 )             31.2         136  |  106  |  32<H>  ----------------------------<  62<L>  4.6   |  22  |  1.60<H>    Ca    8.2<L>      13 May 2017 08:09    TPro  8.9<H>  /  Alb  3.2<L>  /  TBili  0.3  /  DBili  x   /  AST  18  /  ALT  17  /  AlkPhos  93  05-11    PT/INR - ( 11 May 2017 21:16 )   PT: 11.3 sec;   INR: 1.04 ratio         PTT - ( 11 May 2017 21:16 )  PTT:30.1 sec  Urinalysis Basic - ( 12 May 2017 00:26 )    Color: Yellow / Appearance: Slightly Turbid / S.005 / pH: x  Gluc: x / Ketone: Negative  / Bili: Negative / Urobili: Negative mg/dL   Blood: x / Protein: 100 mg/dL / Nitrite: Negative   Leuk Esterase: Moderate / RBC: 25-50 /HPF / WBC 26-50   Sq Epi: x / Non Sq Epi: Occasional / Bacteria: Many      CAPILLARY BLOOD GLUCOSE  65 (13 May 2017 07:28)  273 (12 May 2017 22:58)  117 (12 May 2017 16:53)      CARDIAC MARKERS ( 12 May 2017 08:36 )  .678 ng/mL / x     / x     / x     / 1.2 ng/mL  CARDIAC MARKERS ( 12 May 2017 03:31 )  .680 ng/mL / x     / x     / x     / 1.3 ng/mL  CARDIAC MARKERS ( 11 May 2017 21:16 )  .734 ng/mL / x     / 94 U/L / x     / 1.3 ng/mL            RADIOLOGY & ADDITIONAL TESTS:    Imaging Personally Reviewed:  [y ] YES  [ ] NO    Consultant(s) Notes Reviewed:  [y ] YES  [ ] NO    Care Discussed with Consultants/Other Providers [ ] YES  [ ] NO    PROBLEMS:  NAUSEA,HYPONATREMIA,ELEVATED TROPONIN I LEVEL  VOMITING NAUSEA WEAKNESS  Nausea      Care discussed with family,         [  ]   yes  [  ]  No    imp:    stable[ ]    unstable[  ]     improving [   ]       unchanged  [  ]                Plans:  Continue present plans  [  ] much improved. Evening snack to prevent AM hypoglycemia; d/c telemetry  REsolved hypoNa

## 2017-05-13 NOTE — PROGRESS NOTE ADULT - SUBJECTIVE AND OBJECTIVE BOX
NEPHROLOGY PROGRESS NOTE    CHIEF COMPLAINT:  Hyponatremia    HPI:  Serum Na david from 123 to 136 meq/L on normal saline    ROS:  nausea resolved    EXAM:  T(F): 97.4  HR: 60  BP: 149/40  RR: 18  SpO2: 97%  Wt(kg): --    Conversant, in no apparent distress  Normal respiratory effort, lungs clear bilaterally  Heart RRR with no murmur, no peripheral edema         LABS                             10.7   2.5   )-----------( 285      ( 11 May 2017 21:16 )             31.2          05-13    136  |  106  |  32<H>  ----------------------------<  62<L>  4.6   |  22  |  1.60<H>    Ca    8.2<L>      13 May 2017 08:09    TPro  8.9<H>  /  Alb  3.2<L>  /  TBili  0.3  /  DBili  x   /  AST  18  /  ALT  17  /  AlkPhos  93  05-11    Uric Acid 8  TSH & T4 normal  Cortisol 8.0    ASSESSMENT:  1.  Hyponatremia, hypovolemic (due to lasix & HCTZ) - better  2.  Low AM cortisol - consider working up for adrenal insufficiency    PLAN:  D/C saline  Hold further diuretics

## 2017-05-14 VITALS
DIASTOLIC BLOOD PRESSURE: 60 MMHG | HEART RATE: 66 BPM | OXYGEN SATURATION: 96 % | RESPIRATION RATE: 18 BRPM | SYSTOLIC BLOOD PRESSURE: 164 MMHG | TEMPERATURE: 98 F

## 2017-05-14 LAB
ANION GAP SERPL CALC-SCNC: 10 MMOL/L — SIGNIFICANT CHANGE UP (ref 5–17)
BUN SERPL-MCNC: 30 MG/DL — HIGH (ref 7–23)
CALCIUM SERPL-MCNC: 8.4 MG/DL — LOW (ref 8.5–10.1)
CHLORIDE SERPL-SCNC: 107 MMOL/L — SIGNIFICANT CHANGE UP (ref 96–108)
CO2 SERPL-SCNC: 21 MMOL/L — LOW (ref 22–31)
CREAT SERPL-MCNC: 1.64 MG/DL — HIGH (ref 0.5–1.3)
GLUCOSE SERPL-MCNC: 127 MG/DL — HIGH (ref 70–99)
POTASSIUM SERPL-MCNC: 5 MMOL/L — SIGNIFICANT CHANGE UP (ref 3.5–5.3)
POTASSIUM SERPL-SCNC: 5 MMOL/L — SIGNIFICANT CHANGE UP (ref 3.5–5.3)
SODIUM SERPL-SCNC: 138 MMOL/L — SIGNIFICANT CHANGE UP (ref 135–145)

## 2017-05-14 RX ORDER — PANTOPRAZOLE SODIUM 20 MG/1
1 TABLET, DELAYED RELEASE ORAL
Qty: 30 | Refills: 0 | OUTPATIENT
Start: 2017-05-14

## 2017-05-14 RX ADMIN — Medication 50 MILLIGRAM(S): at 17:33

## 2017-05-14 RX ADMIN — TICAGRELOR 90 MILLIGRAM(S): 90 TABLET ORAL at 05:29

## 2017-05-14 RX ADMIN — TICAGRELOR 90 MILLIGRAM(S): 90 TABLET ORAL at 17:32

## 2017-05-14 RX ADMIN — AMLODIPINE BESYLATE 10 MILLIGRAM(S): 2.5 TABLET ORAL at 05:29

## 2017-05-14 RX ADMIN — Medication 25 MILLIGRAM(S): at 17:33

## 2017-05-14 RX ADMIN — Medication 50 MILLIGRAM(S): at 05:29

## 2017-05-14 RX ADMIN — PANTOPRAZOLE SODIUM 40 MILLIGRAM(S): 20 TABLET, DELAYED RELEASE ORAL at 06:14

## 2017-05-14 RX ADMIN — Medication 25 MILLIGRAM(S): at 05:29

## 2017-05-14 RX ADMIN — ENOXAPARIN SODIUM 30 MILLIGRAM(S): 100 INJECTION SUBCUTANEOUS at 12:20

## 2017-05-14 RX ADMIN — Medication 50 MILLIGRAM(S): at 00:24

## 2017-05-14 RX ADMIN — Medication 30 MILLILITER(S): at 10:24

## 2017-05-14 RX ADMIN — Medication 50 MILLIGRAM(S): at 12:20

## 2017-05-14 RX ADMIN — Medication 4: at 12:19

## 2017-05-14 NOTE — DISCHARGE NOTE ADULT - MEDICATION SUMMARY - MEDICATIONS TO TAKE
I will START or STAY ON the medications listed below when I get home from the hospital:    aspirin 81 mg oral tablet  -- 1 tab(s) by mouth once a day  -- Indication: For CAD S/P percutaneous coronary angioplasty    sodium bicarbonate 325 mg oral tablet  --  by mouth   -- Indication: For CRF    Lantus 100 units/mL subcutaneous solution  -- 14  subcutaneous   -- Indication: For DM    atorvastatin 20 mg oral tablet  -- 1 tab(s) by mouth once a day (at bedtime)  -- Indication: For Hld    Brilinta (ticagrelor) 90 mg oral tablet  -- 1 tab(s) by mouth 2 times a day  -- Indication: For CAD S/P percutaneous coronary angioplasty    metoprolol tartrate 25 mg oral tablet  -- 1 tab(s) by mouth every 12 hours  -- Indication: For CAD S/P percutaneous coronary angioplasty    amLODIPine 10 mg oral tablet  -- 1 tab(s) by mouth once a day  -- Indication: For Htn    furosemide 40 mg oral tablet  -- 1 tab(s) by mouth once a day  -- Indication: For CRF HTN    bisacodyl 10 mg rectal suppository  -- 1 suppository(ies) rectally once a day, As needed, Constipation  -- Indication: For Constipation    pantoprazole 40 mg oral delayed release tablet  -- 1 tab(s) by mouth once a day (before a meal)  -- Indication: For GERD    hydrALAZINE 50 mg oral tablet  -- 1 tab(s) by mouth 4 times a day  -- Indication: For Htn

## 2017-05-14 NOTE — DISCHARGE NOTE ADULT - NS AS ACTIVITY OBS
Do not drive or operate machinery/Walking-Indoors allowed/Showering allowed/Bathing allowed/No Heavy lifting/straining

## 2017-05-14 NOTE — PROGRESS NOTE ADULT - SUBJECTIVE AND OBJECTIVE BOX
COMPLAINS OF CONSTIPATION; EATING  SODIUM NORMALIZE  RENAL FUNCTION SLIGHTLY IMPROVED  NO JVD  CLEAR LUNGS  NON TENDER ABDOMEN  NO CLUBBING CYANOSIS OR EDEMA    RESOLVED HYPONATREMIA  STABLE CARDIOVASCULAR STATUS; CONTINUING WITH PRESENT MANAGEMENT.

## 2017-05-14 NOTE — DISCHARGE NOTE ADULT - SECONDARY DIAGNOSIS.
Acute renal failure, unspecified acute renal failure type CAD S/P percutaneous coronary angioplasty Elevated troponin I level Nausea Type 2 diabetes mellitus with diabetic cataract, with long-term current use of insulin

## 2017-05-14 NOTE — DISCHARGE NOTE ADULT - CARE PLAN
Principal Discharge DX:	Hyponatremia  Goal:	stop Lisinopril HCTZ  Instructions for follow-up, activity and diet:	low fat  Secondary Diagnosis:	Acute renal failure, unspecified acute renal failure type  Secondary Diagnosis:	CAD S/P percutaneous coronary angioplasty  Secondary Diagnosis:	Elevated troponin I level  Secondary Diagnosis:	Nausea  Secondary Diagnosis:	Type 2 diabetes mellitus with diabetic cataract, with long-term current use of insulin

## 2017-05-14 NOTE — DISCHARGE NOTE ADULT - MEDICATION SUMMARY - MEDICATIONS TO STOP TAKING
I will STOP taking the medications listed below when I get home from the hospital:    lisinopril-hydrochlorothiazide 10mg-12.5mg oral tablet  -- 1 tab(s) by mouth once a day

## 2017-05-14 NOTE — DISCHARGE NOTE ADULT - PATIENT PORTAL LINK FT
“You can access the Follow[employer] Patient Portal, offered by [facility], by registering with the following website: [email or web address]”

## 2017-05-18 DIAGNOSIS — R11.2 NAUSEA WITH VOMITING, UNSPECIFIED: ICD-10-CM

## 2017-05-18 DIAGNOSIS — I12.9 HYPERTENSIVE CHRONIC KIDNEY DISEASE WITH STAGE 1 THROUGH STAGE 4 CHRONIC KIDNEY DISEASE, OR UNSPECIFIED CHRONIC KIDNEY DISEASE: ICD-10-CM

## 2017-05-18 DIAGNOSIS — I25.2 OLD MYOCARDIAL INFARCTION: ICD-10-CM

## 2017-05-18 DIAGNOSIS — N18.3 CHRONIC KIDNEY DISEASE, STAGE 3 (MODERATE): ICD-10-CM

## 2017-05-18 DIAGNOSIS — Z88.5 ALLERGY STATUS TO NARCOTIC AGENT: ICD-10-CM

## 2017-05-18 DIAGNOSIS — Z79.84 LONG TERM (CURRENT) USE OF ORAL HYPOGLYCEMIC DRUGS: ICD-10-CM

## 2017-05-18 DIAGNOSIS — I25.10 ATHEROSCLEROTIC HEART DISEASE OF NATIVE CORONARY ARTERY WITHOUT ANGINA PECTORIS: ICD-10-CM

## 2017-05-18 DIAGNOSIS — I45.10 UNSPECIFIED RIGHT BUNDLE-BRANCH BLOCK: ICD-10-CM

## 2017-05-18 DIAGNOSIS — E11.9 TYPE 2 DIABETES MELLITUS WITHOUT COMPLICATIONS: ICD-10-CM

## 2017-05-18 DIAGNOSIS — K21.9 GASTRO-ESOPHAGEAL REFLUX DISEASE WITHOUT ESOPHAGITIS: ICD-10-CM

## 2017-05-18 DIAGNOSIS — Z98.61 CORONARY ANGIOPLASTY STATUS: ICD-10-CM

## 2017-05-18 DIAGNOSIS — K59.00 CONSTIPATION, UNSPECIFIED: ICD-10-CM

## 2017-05-18 DIAGNOSIS — E78.5 HYPERLIPIDEMIA, UNSPECIFIED: ICD-10-CM

## 2017-05-18 DIAGNOSIS — E87.1 HYPO-OSMOLALITY AND HYPONATREMIA: ICD-10-CM

## 2017-05-18 DIAGNOSIS — I73.9 PERIPHERAL VASCULAR DISEASE, UNSPECIFIED: ICD-10-CM

## 2017-08-09 ENCOUNTER — INPATIENT (INPATIENT)
Facility: HOSPITAL | Age: 82
LOS: 0 days | Discharge: ANOTHER HOSPITAL | End: 2017-08-10
Attending: FAMILY MEDICINE | Admitting: FAMILY MEDICINE
Payer: MEDICARE

## 2017-08-09 VITALS
RESPIRATION RATE: 18 BRPM | HEART RATE: 78 BPM | DIASTOLIC BLOOD PRESSURE: 71 MMHG | SYSTOLIC BLOOD PRESSURE: 134 MMHG | OXYGEN SATURATION: 97 %

## 2017-08-09 LAB
ALBUMIN SERPL ELPH-MCNC: 2.7 G/DL — LOW (ref 3.3–5)
ALP SERPL-CCNC: 82 U/L — SIGNIFICANT CHANGE UP (ref 40–120)
ALT FLD-CCNC: 20 U/L — SIGNIFICANT CHANGE UP (ref 12–78)
ANION GAP SERPL CALC-SCNC: 12 MMOL/L — SIGNIFICANT CHANGE UP (ref 5–17)
APTT BLD: 27.2 SEC — LOW (ref 27.5–37.4)
AST SERPL-CCNC: 37 U/L — SIGNIFICANT CHANGE UP (ref 15–37)
BASE EXCESS BLDA CALC-SCNC: 3.8 MMOL/L — HIGH (ref -2–2)
BASOPHILS # BLD AUTO: 0.1 K/UL — SIGNIFICANT CHANGE UP (ref 0–0.2)
BASOPHILS NFR BLD AUTO: 1.5 % — SIGNIFICANT CHANGE UP (ref 0–2)
BILIRUB SERPL-MCNC: 0.5 MG/DL — SIGNIFICANT CHANGE UP (ref 0.2–1.2)
BLOOD GAS COMMENTS: SIGNIFICANT CHANGE UP
BLOOD GAS COMMENTS: SIGNIFICANT CHANGE UP
BLOOD GAS SOURCE: SIGNIFICANT CHANGE UP
BUN SERPL-MCNC: 37 MG/DL — HIGH (ref 7–23)
CALCIUM SERPL-MCNC: 8.6 MG/DL — SIGNIFICANT CHANGE UP (ref 8.5–10.1)
CHLORIDE SERPL-SCNC: 89 MMOL/L — LOW (ref 96–108)
CK MB CFR SERPL CALC: 2.8 NG/ML — SIGNIFICANT CHANGE UP (ref 0.5–3.6)
CO2 SERPL-SCNC: 26 MMOL/L — SIGNIFICANT CHANGE UP (ref 22–31)
CREAT SERPL-MCNC: 2.17 MG/DL — HIGH (ref 0.5–1.3)
EOSINOPHIL # BLD AUTO: 0.1 K/UL — SIGNIFICANT CHANGE UP (ref 0–0.5)
EOSINOPHIL NFR BLD AUTO: 1.6 % — SIGNIFICANT CHANGE UP (ref 0–6)
GLUCOSE SERPL-MCNC: 315 MG/DL — HIGH (ref 70–99)
HCO3 BLDA-SCNC: 27 MMOL/L — SIGNIFICANT CHANGE UP (ref 21–29)
HCT VFR BLD CALC: 34.2 % — LOW (ref 34.5–45)
HGB BLD-MCNC: 10.7 G/DL — LOW (ref 11.5–15.5)
HOROWITZ INDEX BLDA+IHG-RTO: 34 — SIGNIFICANT CHANGE UP
INR BLD: 1.11 RATIO — SIGNIFICANT CHANGE UP (ref 0.88–1.16)
LYMPHOCYTES # BLD AUTO: 0.3 K/UL — LOW (ref 1–3.3)
LYMPHOCYTES # BLD AUTO: 6.6 % — LOW (ref 13–44)
MAGNESIUM SERPL-MCNC: 2.4 MG/DL — SIGNIFICANT CHANGE UP (ref 1.6–2.6)
MCHC RBC-ENTMCNC: 28.1 PG — SIGNIFICANT CHANGE UP (ref 27–34)
MCHC RBC-ENTMCNC: 31.3 GM/DL — LOW (ref 32–36)
MCV RBC AUTO: 89.7 FL — SIGNIFICANT CHANGE UP (ref 80–100)
MONOCYTES # BLD AUTO: 0.4 K/UL — SIGNIFICANT CHANGE UP (ref 0–0.9)
MONOCYTES NFR BLD AUTO: 8.1 % — SIGNIFICANT CHANGE UP (ref 2–14)
NEUTROPHILS # BLD AUTO: 3.7 K/UL — SIGNIFICANT CHANGE UP (ref 1.8–7.4)
NEUTROPHILS NFR BLD AUTO: 82.2 % — HIGH (ref 43–77)
NT-PROBNP SERPL-SCNC: HIGH PG/ML (ref 0–450)
PCO2 BLDA: 38 MMHG — SIGNIFICANT CHANGE UP (ref 32–46)
PH BLD: 7.47 — HIGH (ref 7.35–7.45)
PLATELET # BLD AUTO: 252 K/UL — SIGNIFICANT CHANGE UP (ref 150–400)
PO2 BLDA: 73 MMHG — LOW (ref 74–108)
POTASSIUM SERPL-MCNC: 4.4 MMOL/L — SIGNIFICANT CHANGE UP (ref 3.5–5.3)
POTASSIUM SERPL-SCNC: 4.4 MMOL/L — SIGNIFICANT CHANGE UP (ref 3.5–5.3)
PROT SERPL-MCNC: 8.8 GM/DL — HIGH (ref 6–8.3)
PROTHROM AB SERPL-ACNC: 12.1 SEC — SIGNIFICANT CHANGE UP (ref 9.8–12.7)
RBC # BLD: 3.81 M/UL — SIGNIFICANT CHANGE UP (ref 3.8–5.2)
RBC # FLD: 14 % — SIGNIFICANT CHANGE UP (ref 11–15)
SAO2 % BLDA: 95 % — SIGNIFICANT CHANGE UP (ref 92–96)
SODIUM SERPL-SCNC: 127 MMOL/L — LOW (ref 135–145)
TROPONIN I SERPL-MCNC: 3.88 NG/ML — HIGH (ref 0.01–0.04)
WBC # BLD: 4.5 K/UL — SIGNIFICANT CHANGE UP (ref 3.8–10.5)
WBC # FLD AUTO: 4.5 K/UL — SIGNIFICANT CHANGE UP (ref 3.8–10.5)

## 2017-08-09 PROCEDURE — 99291 CRITICAL CARE FIRST HOUR: CPT

## 2017-08-09 PROCEDURE — 93971 EXTREMITY STUDY: CPT | Mod: 26,RT

## 2017-08-09 PROCEDURE — 71010: CPT | Mod: 26

## 2017-08-09 RX ORDER — FUROSEMIDE 40 MG
60 TABLET ORAL EVERY 12 HOURS
Qty: 0 | Refills: 0 | Status: DISCONTINUED | OUTPATIENT
Start: 2017-08-09 | End: 2017-08-10

## 2017-08-09 RX ORDER — TICAGRELOR 90 MG/1
180 TABLET ORAL ONCE
Qty: 0 | Refills: 0 | Status: COMPLETED | OUTPATIENT
Start: 2017-08-09 | End: 2017-08-09

## 2017-08-09 RX ORDER — HEPARIN SODIUM 5000 [USP'U]/ML
4700 INJECTION INTRAVENOUS; SUBCUTANEOUS EVERY 6 HOURS
Qty: 0 | Refills: 0 | Status: DISCONTINUED | OUTPATIENT
Start: 2017-08-09 | End: 2017-08-10

## 2017-08-09 RX ORDER — HYDRALAZINE HCL 50 MG
50 TABLET ORAL
Qty: 0 | Refills: 0 | Status: DISCONTINUED | OUTPATIENT
Start: 2017-08-09 | End: 2017-08-10

## 2017-08-09 RX ORDER — ASPIRIN/CALCIUM CARB/MAGNESIUM 324 MG
325 TABLET ORAL ONCE
Qty: 0 | Refills: 0 | Status: COMPLETED | OUTPATIENT
Start: 2017-08-09 | End: 2017-08-10

## 2017-08-09 RX ORDER — ATORVASTATIN CALCIUM 80 MG/1
80 TABLET, FILM COATED ORAL ONCE
Qty: 0 | Refills: 0 | Status: COMPLETED | OUTPATIENT
Start: 2017-08-09 | End: 2017-08-10

## 2017-08-09 RX ORDER — ASPIRIN/CALCIUM CARB/MAGNESIUM 324 MG
81 TABLET ORAL DAILY
Qty: 0 | Refills: 0 | Status: DISCONTINUED | OUTPATIENT
Start: 2017-08-09 | End: 2017-08-10

## 2017-08-09 RX ORDER — TICAGRELOR 90 MG/1
90 TABLET ORAL
Qty: 0 | Refills: 0 | Status: DISCONTINUED | OUTPATIENT
Start: 2017-08-09 | End: 2017-08-10

## 2017-08-09 RX ORDER — HEPARIN SODIUM 5000 [USP'U]/ML
INJECTION INTRAVENOUS; SUBCUTANEOUS
Qty: 25000 | Refills: 0 | Status: DISCONTINUED | OUTPATIENT
Start: 2017-08-09 | End: 2017-08-10

## 2017-08-09 RX ORDER — METOPROLOL TARTRATE 50 MG
25 TABLET ORAL
Qty: 0 | Refills: 0 | Status: DISCONTINUED | OUTPATIENT
Start: 2017-08-09 | End: 2017-08-10

## 2017-08-09 RX ORDER — NITROGLYCERIN 6.5 MG
1 CAPSULE, EXTENDED RELEASE ORAL THREE TIMES A DAY
Qty: 0 | Refills: 0 | Status: DISCONTINUED | OUTPATIENT
Start: 2017-08-09 | End: 2017-08-10

## 2017-08-09 RX ORDER — HEPARIN SODIUM 5000 [USP'U]/ML
4700 INJECTION INTRAVENOUS; SUBCUTANEOUS ONCE
Qty: 0 | Refills: 0 | Status: COMPLETED | OUTPATIENT
Start: 2017-08-09 | End: 2017-08-10

## 2017-08-09 RX ORDER — FUROSEMIDE 40 MG
60 TABLET ORAL ONCE
Qty: 0 | Refills: 0 | Status: COMPLETED | OUTPATIENT
Start: 2017-08-09 | End: 2017-08-09

## 2017-08-09 RX ADMIN — Medication 60 MILLIGRAM(S): at 20:22

## 2017-08-09 NOTE — ED PROVIDER NOTE - MEDICAL DECISION MAKING DETAILS
Patient pw sob, likely owing to CHF vs CKF vs both. Patient has a large right leg. Consider DVT/PE. Begin diuresis. Patient pw sob, likely owing to CHF vs CKF vs both. Patient has a large right leg. Consider DVT/PE. Begin diuresis. Patient has NSTEMI. ekg shows st depres in anter and lats. D/w dr olivera and dr. larsen her pmd and cards who agree to admit her here, start nstemi treatment and then transfer her soon for cath.

## 2017-08-09 NOTE — ED PROVIDER NOTE - OBJECTIVE STATEMENT
Pertinent PMH/PSH/FHx/SHx and Review of Systems contained within:  85F hx of ckd, cad sp 2 stents in april, dm pw increasing vasques x several days. no cp, cough, fever. has been compliant with meds. better when sitting up and resting  Fh and Sh not otherwise contributory  ROS otherwise negative

## 2017-08-09 NOTE — ED PROVIDER NOTE - PHYSICAL EXAMINATION
Gen: Alert, NAD  Head: NC, AT   Eyes: blind  ENT: normal hearing, patent oropharynx without erythema/exudate, uvula midline  Neck: supple, no tenderness, Trachea midline  Pulm: diminished breath sounds bl, crackles to mid lungs. speaking full sentences  CV: RRR, no M/R/G, 2+ radial and dp pulses bl, great edema of the right leg, minimal edema of the left leg (chronic)   Abd: soft, NT/ND, +BS, no hepatosplenomegaly  Mskel: toes of right foot amputated  Skin: no rash, no bruising   Neuro: AAOx3, no sensory/motor deficits, CN 2-12 intact

## 2017-08-09 NOTE — ED PROVIDER NOTE - CARE PLAN
Principal Discharge DX:	Acute on chronic diastolic congestive heart failure Principal Discharge DX:	Acute on chronic diastolic congestive heart failure  Secondary Diagnosis:	NSTEMI (non-ST elevation myocardial infarction)

## 2017-08-10 LAB
ANION GAP SERPL CALC-SCNC: 12 MMOL/L — SIGNIFICANT CHANGE UP (ref 5–17)
APPEARANCE UR: ABNORMAL
APTT BLD: 185.6 SEC — CRITICAL HIGH (ref 27.5–37.4)
BACTERIA # UR AUTO: ABNORMAL
BILIRUB UR-MCNC: NEGATIVE — SIGNIFICANT CHANGE UP
BUN SERPL-MCNC: 36 MG/DL — HIGH (ref 7–23)
CALCIUM SERPL-MCNC: 9.2 MG/DL — SIGNIFICANT CHANGE UP (ref 8.5–10.1)
CHLORIDE SERPL-SCNC: 93 MMOL/L — LOW (ref 96–108)
CO2 SERPL-SCNC: 25 MMOL/L — SIGNIFICANT CHANGE UP (ref 22–31)
COLOR SPEC: YELLOW — SIGNIFICANT CHANGE UP
COMMENT - URINE: SIGNIFICANT CHANGE UP
CREAT SERPL-MCNC: 1.97 MG/DL — HIGH (ref 0.5–1.3)
DIFF PNL FLD: ABNORMAL
EPI CELLS # UR: SIGNIFICANT CHANGE UP
GLUCOSE SERPL-MCNC: 129 MG/DL — HIGH (ref 70–99)
GLUCOSE UR QL: NEGATIVE MG/DL — SIGNIFICANT CHANGE UP
HCT VFR BLD CALC: 34.8 % — SIGNIFICANT CHANGE UP (ref 34.5–45)
HGB BLD-MCNC: 10.6 G/DL — LOW (ref 11.5–15.5)
HYALINE CASTS # UR AUTO: ABNORMAL /LPF
KETONES UR-MCNC: NEGATIVE — SIGNIFICANT CHANGE UP
LEUKOCYTE ESTERASE UR-ACNC: ABNORMAL
MCHC RBC-ENTMCNC: 27.7 PG — SIGNIFICANT CHANGE UP (ref 27–34)
MCHC RBC-ENTMCNC: 30.5 GM/DL — LOW (ref 32–36)
MCV RBC AUTO: 90.9 FL — SIGNIFICANT CHANGE UP (ref 80–100)
NITRITE UR-MCNC: POSITIVE
PH UR: 6.5 — SIGNIFICANT CHANGE UP (ref 5–8)
PLATELET # BLD AUTO: 243 K/UL — SIGNIFICANT CHANGE UP (ref 150–400)
POTASSIUM SERPL-MCNC: 3.7 MMOL/L — SIGNIFICANT CHANGE UP (ref 3.5–5.3)
POTASSIUM SERPL-SCNC: 3.7 MMOL/L — SIGNIFICANT CHANGE UP (ref 3.5–5.3)
PROT UR-MCNC: 100 MG/DL
RBC # BLD: 3.82 M/UL — SIGNIFICANT CHANGE UP (ref 3.8–5.2)
RBC # FLD: 13.8 % — SIGNIFICANT CHANGE UP (ref 11–15)
RBC CASTS # UR COMP ASSIST: ABNORMAL /HPF (ref 0–4)
SODIUM SERPL-SCNC: 130 MMOL/L — LOW (ref 135–145)
SP GR SPEC: 1 — LOW (ref 1.01–1.02)
TROPONIN I SERPL-MCNC: 3.72 NG/ML — HIGH (ref 0.01–0.04)
UROBILINOGEN FLD QL: NEGATIVE MG/DL — SIGNIFICANT CHANGE UP
WBC # BLD: 4.3 K/UL — SIGNIFICANT CHANGE UP (ref 3.8–10.5)
WBC # FLD AUTO: 4.3 K/UL — SIGNIFICANT CHANGE UP (ref 3.8–10.5)
WBC UR QL: >50

## 2017-08-10 PROCEDURE — 93010 ELECTROCARDIOGRAM REPORT: CPT

## 2017-08-10 RX ORDER — INSULIN GLARGINE 100 [IU]/ML
30 INJECTION, SOLUTION SUBCUTANEOUS EVERY MORNING
Qty: 0 | Refills: 0 | Status: DISCONTINUED | OUTPATIENT
Start: 2017-08-10 | End: 2017-08-10

## 2017-08-10 RX ORDER — DEXTROSE 50 % IN WATER 50 %
1 SYRINGE (ML) INTRAVENOUS ONCE
Qty: 0 | Refills: 0 | Status: DISCONTINUED | OUTPATIENT
Start: 2017-08-10 | End: 2017-08-10

## 2017-08-10 RX ORDER — INSULIN GLARGINE 100 [IU]/ML
14 INJECTION, SOLUTION SUBCUTANEOUS EVERY MORNING
Qty: 0 | Refills: 0 | Status: DISCONTINUED | OUTPATIENT
Start: 2017-08-10 | End: 2017-08-10

## 2017-08-10 RX ORDER — DEXTROSE 50 % IN WATER 50 %
25 SYRINGE (ML) INTRAVENOUS ONCE
Qty: 0 | Refills: 0 | Status: DISCONTINUED | OUTPATIENT
Start: 2017-08-10 | End: 2017-08-10

## 2017-08-10 RX ORDER — SODIUM CHLORIDE 9 MG/ML
1000 INJECTION, SOLUTION INTRAVENOUS
Qty: 0 | Refills: 0 | Status: DISCONTINUED | OUTPATIENT
Start: 2017-08-10 | End: 2017-08-10

## 2017-08-10 RX ORDER — DEXTROSE 50 % IN WATER 50 %
12.5 SYRINGE (ML) INTRAVENOUS ONCE
Qty: 0 | Refills: 0 | Status: DISCONTINUED | OUTPATIENT
Start: 2017-08-10 | End: 2017-08-10

## 2017-08-10 RX ORDER — SODIUM BICARBONATE 1 MEQ/ML
650 SYRINGE (ML) INTRAVENOUS
Qty: 0 | Refills: 0 | Status: DISCONTINUED | OUTPATIENT
Start: 2017-08-10 | End: 2017-08-10

## 2017-08-10 RX ORDER — INSULIN LISPRO 100/ML
VIAL (ML) SUBCUTANEOUS
Qty: 0 | Refills: 0 | Status: DISCONTINUED | OUTPATIENT
Start: 2017-08-10 | End: 2017-08-10

## 2017-08-10 RX ORDER — GLUCAGON INJECTION, SOLUTION 0.5 MG/.1ML
1 INJECTION, SOLUTION SUBCUTANEOUS ONCE
Qty: 0 | Refills: 0 | Status: DISCONTINUED | OUTPATIENT
Start: 2017-08-10 | End: 2017-08-10

## 2017-08-10 RX ADMIN — Medication 1 INCH(S): at 06:38

## 2017-08-10 RX ADMIN — Medication 4: at 11:12

## 2017-08-10 RX ADMIN — Medication 81 MILLIGRAM(S): at 11:10

## 2017-08-10 RX ADMIN — Medication 25 MILLIGRAM(S): at 06:38

## 2017-08-10 RX ADMIN — HEPARIN SODIUM 700 UNIT(S)/HR: 5000 INJECTION INTRAVENOUS; SUBCUTANEOUS at 10:57

## 2017-08-10 RX ADMIN — ATORVASTATIN CALCIUM 80 MILLIGRAM(S): 80 TABLET, FILM COATED ORAL at 11:11

## 2017-08-10 RX ADMIN — Medication 60 MILLIGRAM(S): at 06:37

## 2017-08-10 RX ADMIN — INSULIN GLARGINE 14 UNIT(S): 100 INJECTION, SOLUTION SUBCUTANEOUS at 09:16

## 2017-08-10 RX ADMIN — Medication 50 MILLIGRAM(S): at 06:38

## 2017-08-10 RX ADMIN — HEPARIN SODIUM 4700 UNIT(S): 5000 INJECTION INTRAVENOUS; SUBCUTANEOUS at 01:10

## 2017-08-10 RX ADMIN — TICAGRELOR 180 MILLIGRAM(S): 90 TABLET ORAL at 00:01

## 2017-08-10 RX ADMIN — HEPARIN SODIUM 950 UNIT(S)/HR: 5000 INJECTION INTRAVENOUS; SUBCUTANEOUS at 01:08

## 2017-08-10 RX ADMIN — Medication 50 MILLIGRAM(S): at 01:20

## 2017-08-10 RX ADMIN — TICAGRELOR 90 MILLIGRAM(S): 90 TABLET ORAL at 06:38

## 2017-08-10 RX ADMIN — Medication 50 MILLIGRAM(S): at 11:15

## 2017-08-10 RX ADMIN — HEPARIN SODIUM 0 UNIT(S)/HR: 5000 INJECTION INTRAVENOUS; SUBCUTANEOUS at 09:12

## 2017-08-10 RX ADMIN — Medication 325 MILLIGRAM(S): at 09:14

## 2017-08-10 NOTE — H&P ADULT - HISTORY OF PRESENT ILLNESS
pt c Hx of DM CKD diabeic neuropathy, angiopathy, 2 PCI in April 2017 with progressive SOB. In ER > troponin

## 2017-08-10 NOTE — H&P ADULT - ASSESSMENT
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

## 2017-08-10 NOTE — PROGRESS NOTE ADULT - SUBJECTIVE AND OBJECTIVE BOX
NO CHEST PAIN  VOIDING  SOME DYSPNEA  RALES  SOFT S1  EDEMA    IMPRESSION:  NSTEMI  CHF / ACUTE ON CHRONIC HEART FAILURE SECONDARY TO: ISCHEMIC HEART DISEASE; NOS  CONTINUING PRESENT CV CARE  FOR TRANSFER TO [facility] FOR CARDIAC CATH

## 2017-08-10 NOTE — H&P ADULT - NSHPPHYSICALEXAM_GEN_ALL_CORE
R eye blind  ENT wnl  Lungs clear, << BS at bases  S1S2 rrr  Abd soft + BS  Extr s/p R TMA, << pulse DPA, TPA b/l

## 2017-08-10 NOTE — CONSULT NOTE ADULT - SUBJECTIVE AND OBJECTIVE BOX
Patient is a 85y old  Female with hx of CKD/Cr 1.5-1.8, diastolic HF, CAD admitted yest for eval of exertional dyspnea. CXR with PVC. Troponin 3.6.  Being txed for CHF exacerbation, AMI. On IV diuretics, Heparin gtt.  Cr flux noted. Pt feels somewhat better but still SOB with min exertion.         PAST MEDICAL & SURGICAL HISTORY:  CAD S/P percutaneous coronary angioplasty  PVD (peripheral vascular disease)  HTN (hypertension)  DM (diabetes mellitus screen)  S/P foot surgery: left foot surgery with sage s/p fx.  S/P cataract surgery  S/P appendectomy  S/P hysterectomy  Amputation foot, unilat      Social Hx: denies smoking.     FAMILY HISTORY:  No pertinent family history in first degree relatives      Allergies    codeine (Other)    Intolerances        MEDICATIONS  (STANDING):  heparin  Infusion.  Unit(s)/Hr (9.5 mL/Hr) IV Continuous <Continuous>  atorvastatin 80 milliGRAM(s) Oral once  furosemide   Injectable 60 milliGRAM(s) IV Push every 12 hours  ticagrelor 90 milliGRAM(s) Oral two times a day  metoprolol 25 milliGRAM(s) Oral two times a day  aspirin enteric coated 81 milliGRAM(s) Oral daily  nitroglycerin    2% Ointment 1 Inch(s) Transdermal three times a day  hydrALAZINE 50 milliGRAM(s) Oral four times a day  sodium bicarbonate 650 milliGRAM(s) Oral two times a day  insulin lispro (HumaLOG) corrective regimen sliding scale   SubCutaneous three times a day before meals  dextrose 5%. 1000 milliLiter(s) (50 mL/Hr) IV Continuous <Continuous>  dextrose 50% Injectable 12.5 Gram(s) IV Push once  dextrose 50% Injectable 25 Gram(s) IV Push once  dextrose 50% Injectable 25 Gram(s) IV Push once  insulin glargine Injectable (LANTUS) 14 Unit(s) SubCutaneous every morning    MEDICATIONS  (PRN):  heparin  Injectable 4700 Unit(s) IV Push every 6 hours PRN For aPTT less than 40  bisacodyl Suppository 10 milliGRAM(s) Rectal daily PRN Constipation  dextrose Gel 1 Dose(s) Oral once PRN Blood Glucose LESS THAN 70 milliGRAM(s)/deciliter  glucagon  Injectable 1 milliGRAM(s) IntraMuscular once PRN Glucose LESS THAN 70 milligrams/deciliter      Daily Height in cm: 167.64 (09 Aug 2017 21:49)    Daily Weight in k (10 Aug 2017 05:07)    Drug Dosing Weight  Height (cm): 167.64 (09 Aug 2017 21:49)  Weight (kg): 80 (09 Aug 2017 21:49)  BMI (kg/m2): 28.5 (09 Aug 2017 21:49)  BSA (m2): 1.9 (09 Aug 2017 21:49)      REVIEW OF SYSTEMS:    CONSTITUTIONAL: No fever, weight loss, or fatigue  ENMT:  No difficulty hearing, tinnitus, vertigo; No sinus or throat pain  NECK: No pain or stiffness  RESPIRATORY: POS shortness of breath  CARDIOVASCULAR: pos chest pain, palpitations, dizziness, or leg swelling  GASTROINTESTINAL: No abdominal or epigastric pain. No nausea, vomiting, or hematemesis; No diarrhea or constipation. No melena or hematochezia.  GENITOURINARY: No dysuria, frequency, hematuria, or incontinence  SKIN: No itching, burning, rashes, or lesions   LYMPH NODES: No enlarged glands  NEURO: no asterixis        ABG - ( 09 Aug 2017 20:35 )  pH: x     /  pCO2: 38    /  pO2: 73    / HCO3: 27    / Base Excess: 3.8   /  SaO2: 95                  I&O's Detail    09 Aug 2017 07:01  -  10 Aug 2017 07:00  --------------------------------------------------------  IN:  Total IN: 0 mL    OUT:    Voided: 400 mL  Total OUT: 400 mL    Total NET: -400 mL           @ 07:  -  08-10 @ 07:00  --------------------------------------------------------  IN: 0 mL / OUT: 400 mL / NET: -400 mL        PHYSICAL EXAM:    GENERAL: mildly anxious  HEAD:  Atraumatic, Normocephalic  EYES: EOMI, PERRLA, conjunctiva and sclera clear  ENMT: No tonsillar erythema, exudates, or enlargement; Moist mucous membranes, Good dentition, No lesions  NECK: Supple, No JVD, Normal thyroid  NERVOUS SYSTEM:  Alert & Oriented X3, Good concentration; Motor Strength 5/5 B/L upper and lower extremities; DTRs 2+ intact and symmetric  CHEST/LUNG: basilar crackles  HEART: Regular rate and rhythm; No murmurs, rubs, or gallops  ABDOMEN: Soft, Nontender, Nondistended; Bowel sounds present  EXTREMITIES:  2+ Peripheral Pulses, pos edema  LYMPH: No lymphadenopathy noted  SKIN: No rashes or lesions    LABS:  CBC Full  -  ( 10 Aug 2017 08:32 )  WBC Count : 4.3 K/uL  Hemoglobin : 10.6 g/dL  Hematocrit : 34.8 %  Platelet Count - Automated : 243 K/uL  Mean Cell Volume : 90.9 fl  Mean Cell Hemoglobin : 27.7 pg  Mean Cell Hemoglobin Concentration : 30.5 gm/dL  Auto Neutrophil # : x  Auto Lymphocyte # : x  Auto Monocyte # : x  Auto Eosinophil # : x  Auto Basophil # : x  Auto Neutrophil % : x  Auto Lymphocyte % : x  Auto Monocyte % : x  Auto Eosinophil % : x  Auto Basophil % : x    08-10    130<L>  |  93<L>  |  36<H>  ----------------------------<  129<H>  3.7   |  25  |  1.97<H>    Ca    9.2      10 Aug 2017 08:32  Mg     2.4         TPro  8.8<H>  /  Alb  2.7<L>  /  TBili  0.5  /  DBili  x   /  AST  37  /  ALT  20  /  AlkPhos  82  08    CAPILLARY BLOOD GLUCOSE  138 (10 Aug 2017 08:01)        PT/INR - ( 09 Aug 2017 22:40 )   PT: 12.1 sec;   INR: 1.11 ratio         PTT - ( 10 Aug 2017 08:32 )  PTT:185.6 sec  Urinalysis Basic - ( 10 Aug 2017 01:13 )    Color: Yellow / Appearance: x / S.005 / pH: x  Gluc: x / Ketone: Negative  / Bili: Negative / Urobili: Negative mg/dL   Blood: x / Protein: 100 mg/dL / Nitrite: Positive   Leuk Esterase: Moderate / RBC: 6-10 /HPF / WBC >50   Sq Epi: x / Non Sq Epi: Few / Bacteria: Many      CARDIAC MARKERS ( 10 Aug 2017 07:25 )  3.720 ng/mL / x     / x     / x     / x      CARDIAC MARKERS ( 09 Aug 2017 20:41 )  3.880 ng/mL / x     / x     / x     / 2.8 ng/mL        ASSESSMENT and PLAN:  CHF, AMI, MAXIMINO, CKD  * MAXIMINO -- mild cardio-renal flux in setting of AMI, diastolic CHF. Obtain outpt renal w/u. Follow UA, Renal US. Cont diuresis. Repeat 2D Echo to re-eval LVF  * Chronic meta acidosis -- on PO bicarb as outpt. May hold oral bicarb in setting of decompensated HF.   * Low NaCl diet, PO FR
MEDICAL RECORD REVIEWED; HISTORY AND  REVIEW OF SYSTEMS OBTAINED; PATIENT EXAMINED:    85 FEMALE  BACK FROM 2 WEEKS IN [facility] WITH 2 WEEKS SOB; PRESENTS WITH CHF, NSTEMI, NO CHEST PAIN  NSTEMI 4/17: PCI AT [facility]  CKD  DM    ALL LABS/RADIOLOGY/MEDICATIONS REVIEWED;    EXAM: NO JVD RALES BILATERALLY SOFT S1 EDEMA RIGHT TOE AMPUTATION  ECG: NSR FIRST DEG AVB IVCD NONSPECIFIC ST T WAVE CHANGES     IMPRESSION:    ADMIT   RX : CHF/ACS  CATH

## 2017-08-13 DIAGNOSIS — Z90.710 ACQUIRED ABSENCE OF BOTH CERVIX AND UTERUS: ICD-10-CM

## 2017-08-13 DIAGNOSIS — I25.9 CHRONIC ISCHEMIC HEART DISEASE, UNSPECIFIED: ICD-10-CM

## 2017-08-13 DIAGNOSIS — Z88.5 ALLERGY STATUS TO NARCOTIC AGENT: ICD-10-CM

## 2017-08-13 DIAGNOSIS — Z90.49 ACQUIRED ABSENCE OF OTHER SPECIFIED PARTS OF DIGESTIVE TRACT: ICD-10-CM

## 2017-08-13 DIAGNOSIS — E11.22 TYPE 2 DIABETES MELLITUS WITH DIABETIC CHRONIC KIDNEY DISEASE: ICD-10-CM

## 2017-08-13 DIAGNOSIS — I21.4 NON-ST ELEVATION (NSTEMI) MYOCARDIAL INFARCTION: ICD-10-CM

## 2017-08-13 DIAGNOSIS — Z79.4 LONG TERM (CURRENT) USE OF INSULIN: ICD-10-CM

## 2017-08-13 DIAGNOSIS — E11.40 TYPE 2 DIABETES MELLITUS WITH DIABETIC NEUROPATHY, UNSPECIFIED: ICD-10-CM

## 2017-08-13 DIAGNOSIS — I13.0 HYPERTENSIVE HEART AND CHRONIC KIDNEY DISEASE WITH HEART FAILURE AND STAGE 1 THROUGH STAGE 4 CHRONIC KIDNEY DISEASE, OR UNSPECIFIED CHRONIC KIDNEY DISEASE: ICD-10-CM

## 2017-08-13 DIAGNOSIS — Z98.49 CATARACT EXTRACTION STATUS, UNSPECIFIED EYE: ICD-10-CM

## 2017-08-13 DIAGNOSIS — N18.4 CHRONIC KIDNEY DISEASE, STAGE 4 (SEVERE): ICD-10-CM

## 2017-08-13 DIAGNOSIS — Z79.82 LONG TERM (CURRENT) USE OF ASPIRIN: ICD-10-CM

## 2017-08-13 DIAGNOSIS — I50.43 ACUTE ON CHRONIC COMBINED SYSTOLIC (CONGESTIVE) AND DIASTOLIC (CONGESTIVE) HEART FAILURE: ICD-10-CM

## 2017-08-13 DIAGNOSIS — E87.2 ACIDOSIS: ICD-10-CM

## 2017-08-13 DIAGNOSIS — E11.51 TYPE 2 DIABETES MELLITUS WITH DIABETIC PERIPHERAL ANGIOPATHY WITHOUT GANGRENE: ICD-10-CM

## 2017-08-13 DIAGNOSIS — Z89.421 ACQUIRED ABSENCE OF OTHER RIGHT TOE(S): ICD-10-CM

## 2017-08-13 DIAGNOSIS — N17.9 ACUTE KIDNEY FAILURE, UNSPECIFIED: ICD-10-CM

## 2017-08-13 DIAGNOSIS — R06.02 SHORTNESS OF BREATH: ICD-10-CM

## 2017-08-28 VITALS — WEIGHT: 160.94 LBS

## 2017-08-28 RX ORDER — TICAGRELOR 90 MG/1
1 TABLET ORAL
Qty: 0 | Refills: 0 | COMMUNITY

## 2017-08-28 RX ORDER — AMLODIPINE BESYLATE 2.5 MG/1
1 TABLET ORAL
Qty: 0 | Refills: 0 | COMMUNITY

## 2017-08-28 RX ORDER — INSULIN GLARGINE 100 [IU]/ML
14 INJECTION, SOLUTION SUBCUTANEOUS
Qty: 0 | Refills: 0 | COMMUNITY

## 2017-08-28 RX ORDER — SODIUM BICARBONATE 1 MEQ/ML
0 SYRINGE (ML) INTRAVENOUS
Qty: 0 | Refills: 0 | COMMUNITY

## 2017-08-28 RX ORDER — FUROSEMIDE 40 MG
1 TABLET ORAL
Qty: 0 | Refills: 0 | COMMUNITY

## 2017-08-28 RX ORDER — HYDRALAZINE HCL 50 MG
1 TABLET ORAL
Qty: 0 | Refills: 0 | COMMUNITY

## 2017-08-28 NOTE — DISCHARGE NOTE ADULT - SECONDARY DIAGNOSIS.
CHF (congestive heart failure), NYHA class I, acute on chronic, combined CAD S/P percutaneous coronary angioplasty

## 2017-08-28 NOTE — DISCHARGE NOTE ADULT - PATIENT PORTAL LINK FT
“You can access the Follow[employer] Patient Portal, offered by [facility], by registering with the following website: [email or web address]”

## 2017-08-28 NOTE — DISCHARGE NOTE ADULT - HOSPITAL COURSE
pt Dx with ACS NSEMi Acute on chronic CHF, evaluated by cardiologist and transferred for cardiac catheterization to [facility]

## 2017-08-28 NOTE — DISCHARGE NOTE ADULT - CARE PROVIDER_API CALL
Stew Walker (), Family Medicine  [address] [place] [address]  Phone: [phone]  Fax: [phone]    Marvin Mendoza), Cardiology; Internal Medicine; Nuclear Cardiology  [address] [place] [address]  Phone: [phone]  Fax: [phone]

## 2017-08-28 NOTE — DISCHARGE NOTE ADULT - CARE PLAN
Principal Discharge DX:	NSTEMI (non-ST elevation myocardial infarction)  Goal:	transfer for angiogram  Instructions for follow-up, activity and diet:	dash  Secondary Diagnosis:	CHF (congestive heart failure), NYHA class I, acute on chronic, combined  Secondary Diagnosis:	CAD S/P percutaneous coronary angioplasty

## 2017-09-05 ENCOUNTER — INPATIENT (INPATIENT)
Facility: HOSPITAL | Age: 82
LOS: 1 days | Discharge: HOME | End: 2017-09-07
Attending: INTERNAL MEDICINE | Admitting: INTERNAL MEDICINE
Payer: MEDICARE

## 2017-09-05 VITALS
HEART RATE: 62 BPM | WEIGHT: 143.96 LBS | TEMPERATURE: 98 F | DIASTOLIC BLOOD PRESSURE: 58 MMHG | RESPIRATION RATE: 18 BRPM | HEIGHT: 59 IN | SYSTOLIC BLOOD PRESSURE: 129 MMHG | OXYGEN SATURATION: 100 %

## 2017-09-05 LAB
APTT BLD: 29.4 SEC — SIGNIFICANT CHANGE UP (ref 27.5–37.4)
HCT VFR BLD CALC: 30.7 % — LOW (ref 34.5–45)
HGB BLD-MCNC: 9.6 G/DL — LOW (ref 11.5–15.5)
INR BLD: 1.07 RATIO — SIGNIFICANT CHANGE UP (ref 0.88–1.16)
MCHC RBC-ENTMCNC: 28.3 PG — SIGNIFICANT CHANGE UP (ref 27–34)
MCHC RBC-ENTMCNC: 31.3 GM/DL — LOW (ref 32–36)
MCV RBC AUTO: 90.2 FL — SIGNIFICANT CHANGE UP (ref 80–100)
PLATELET # BLD AUTO: 241 K/UL — SIGNIFICANT CHANGE UP (ref 150–400)
PROTHROM AB SERPL-ACNC: 11.7 SEC — SIGNIFICANT CHANGE UP (ref 9.8–12.7)
RBC # BLD: 3.41 M/UL — LOW (ref 3.8–5.2)
RBC # FLD: 14.4 % — SIGNIFICANT CHANGE UP (ref 11–15)
WBC # BLD: 2.1 K/UL — LOW (ref 3.8–10.5)
WBC # FLD AUTO: 2.1 K/UL — LOW (ref 3.8–10.5)

## 2017-09-05 PROCEDURE — 71010: CPT | Mod: 26

## 2017-09-05 PROCEDURE — 99285 EMERGENCY DEPT VISIT HI MDM: CPT

## 2017-09-05 RX ORDER — FUROSEMIDE 40 MG
60 TABLET ORAL ONCE
Qty: 0 | Refills: 0 | Status: COMPLETED | OUTPATIENT
Start: 2017-09-05 | End: 2017-09-05

## 2017-09-05 NOTE — ED ADULT TRIAGE NOTE - CHIEF COMPLAINT QUOTE
pt BIB daughter with c/o SOB, generalized weakness, pt given diuretics today by nephrologist with no relief in SOB

## 2017-09-06 LAB
ALBUMIN SERPL ELPH-MCNC: 3 G/DL — LOW (ref 3.3–5)
ALP SERPL-CCNC: 90 U/L — SIGNIFICANT CHANGE UP (ref 40–120)
ALT FLD-CCNC: 57 U/L — SIGNIFICANT CHANGE UP (ref 12–78)
ANION GAP SERPL CALC-SCNC: 10 MMOL/L — SIGNIFICANT CHANGE UP (ref 5–17)
APPEARANCE UR: CLEAR — SIGNIFICANT CHANGE UP
AST SERPL-CCNC: 57 U/L — HIGH (ref 15–37)
BACTERIA # UR AUTO: ABNORMAL
BILIRUB SERPL-MCNC: 0.4 MG/DL — SIGNIFICANT CHANGE UP (ref 0.2–1.2)
BILIRUB UR-MCNC: NEGATIVE — SIGNIFICANT CHANGE UP
BUN SERPL-MCNC: 32 MG/DL — HIGH (ref 7–23)
CALCIUM SERPL-MCNC: 9.2 MG/DL — SIGNIFICANT CHANGE UP (ref 8.5–10.1)
CHLORIDE SERPL-SCNC: 90 MMOL/L — LOW (ref 96–108)
CK MB BLD-MCNC: 1.1 % — SIGNIFICANT CHANGE UP (ref 0–3.5)
CK MB CFR SERPL CALC: 1.2 NG/ML — SIGNIFICANT CHANGE UP (ref 0.5–3.6)
CK SERPL-CCNC: 110 U/L — SIGNIFICANT CHANGE UP (ref 26–192)
CO2 SERPL-SCNC: 24 MMOL/L — SIGNIFICANT CHANGE UP (ref 22–31)
COLOR SPEC: YELLOW — SIGNIFICANT CHANGE UP
CREAT SERPL-MCNC: 1.93 MG/DL — HIGH (ref 0.5–1.3)
DIFF PNL FLD: NEGATIVE — SIGNIFICANT CHANGE UP
EOSINOPHIL NFR BLD AUTO: 4 % — SIGNIFICANT CHANGE UP (ref 0–6)
EPI CELLS # UR: ABNORMAL
GLUCOSE SERPL-MCNC: 78 MG/DL — SIGNIFICANT CHANGE UP (ref 70–99)
GLUCOSE UR QL: NEGATIVE MG/DL — SIGNIFICANT CHANGE UP
HYPOCHROMIA BLD QL: SLIGHT — SIGNIFICANT CHANGE UP
KETONES UR-MCNC: NEGATIVE — SIGNIFICANT CHANGE UP
LEUKOCYTE ESTERASE UR-ACNC: ABNORMAL
LYMPHOCYTES # BLD AUTO: 33 % — SIGNIFICANT CHANGE UP (ref 13–44)
MACROCYTES BLD QL: SLIGHT — SIGNIFICANT CHANGE UP
MONOCYTES NFR BLD AUTO: 7 % — SIGNIFICANT CHANGE UP (ref 2–14)
NEUTROPHILS NFR BLD AUTO: 52 % — SIGNIFICANT CHANGE UP (ref 43–77)
NITRITE UR-MCNC: NEGATIVE — SIGNIFICANT CHANGE UP
NT-PROBNP SERPL-SCNC: HIGH PG/ML (ref 0–450)
PH UR: 7 — SIGNIFICANT CHANGE UP (ref 5–8)
PLAT MORPH BLD: NORMAL — SIGNIFICANT CHANGE UP
POIKILOCYTOSIS BLD QL AUTO: SLIGHT — SIGNIFICANT CHANGE UP
POLYCHROMASIA BLD QL SMEAR: SLIGHT — SIGNIFICANT CHANGE UP
POTASSIUM SERPL-MCNC: 5.2 MMOL/L — SIGNIFICANT CHANGE UP (ref 3.5–5.3)
POTASSIUM SERPL-SCNC: 5.2 MMOL/L — SIGNIFICANT CHANGE UP (ref 3.5–5.3)
PROT SERPL-MCNC: 8.2 GM/DL — SIGNIFICANT CHANGE UP (ref 6–8.3)
PROT UR-MCNC: 30 MG/DL
RBC BLD AUTO: ABNORMAL
RBC CASTS # UR COMP ASSIST: ABNORMAL /HPF (ref 0–4)
SODIUM SERPL-SCNC: 124 MMOL/L — LOW (ref 135–145)
SP GR SPEC: 1 — LOW (ref 1.01–1.02)
TROPONIN I SERPL-MCNC: 1.59 NG/ML — HIGH (ref 0.01–0.04)
UROBILINOGEN FLD QL: NEGATIVE MG/DL — SIGNIFICANT CHANGE UP
VARIANT LYMPHS # BLD: 4 % — SIGNIFICANT CHANGE UP (ref 0–6)
WBC UR QL: ABNORMAL

## 2017-09-06 PROCEDURE — 93010 ELECTROCARDIOGRAM REPORT: CPT

## 2017-09-06 RX ORDER — ASPIRIN/CALCIUM CARB/MAGNESIUM 324 MG
81 TABLET ORAL DAILY
Qty: 0 | Refills: 0 | Status: DISCONTINUED | OUTPATIENT
Start: 2017-09-06 | End: 2017-09-07

## 2017-09-06 RX ORDER — AMLODIPINE BESYLATE 2.5 MG/1
10 TABLET ORAL DAILY
Qty: 0 | Refills: 0 | Status: DISCONTINUED | OUTPATIENT
Start: 2017-09-06 | End: 2017-09-07

## 2017-09-06 RX ORDER — SODIUM CHLORIDE 9 MG/ML
1 INJECTION INTRAMUSCULAR; INTRAVENOUS; SUBCUTANEOUS EVERY 8 HOURS
Qty: 0 | Refills: 0 | Status: DISCONTINUED | OUTPATIENT
Start: 2017-09-06 | End: 2017-09-07

## 2017-09-06 RX ORDER — LISINOPRIL 2.5 MG/1
5 TABLET ORAL DAILY
Qty: 0 | Refills: 0 | Status: DISCONTINUED | OUTPATIENT
Start: 2017-09-06 | End: 2017-09-07

## 2017-09-06 RX ORDER — SODIUM BICARBONATE 1 MEQ/ML
325 SYRINGE (ML) INTRAVENOUS
Qty: 0 | Refills: 0 | Status: DISCONTINUED | OUTPATIENT
Start: 2017-09-06 | End: 2017-09-06

## 2017-09-06 RX ORDER — ISOSORBIDE MONONITRATE 60 MG/1
30 TABLET, EXTENDED RELEASE ORAL DAILY
Qty: 0 | Refills: 0 | Status: DISCONTINUED | OUTPATIENT
Start: 2017-09-06 | End: 2017-09-07

## 2017-09-06 RX ORDER — HYDRALAZINE HCL 50 MG
50 TABLET ORAL EVERY 8 HOURS
Qty: 0 | Refills: 0 | Status: DISCONTINUED | OUTPATIENT
Start: 2017-09-06 | End: 2017-09-07

## 2017-09-06 RX ORDER — SODIUM BICARBONATE 1 MEQ/ML
325 SYRINGE (ML) INTRAVENOUS
Qty: 0 | Refills: 0 | Status: DISCONTINUED | OUTPATIENT
Start: 2017-09-06 | End: 2017-09-07

## 2017-09-06 RX ORDER — TICAGRELOR 90 MG/1
90 TABLET ORAL
Qty: 0 | Refills: 0 | Status: DISCONTINUED | OUTPATIENT
Start: 2017-09-06 | End: 2017-09-07

## 2017-09-06 RX ORDER — FUROSEMIDE 40 MG
40 TABLET ORAL EVERY 12 HOURS
Qty: 0 | Refills: 0 | Status: DISCONTINUED | OUTPATIENT
Start: 2017-09-06 | End: 2017-09-07

## 2017-09-06 RX ORDER — DEXTROSE 50 % IN WATER 50 %
50 SYRINGE (ML) INTRAVENOUS ONCE
Qty: 0 | Refills: 0 | Status: COMPLETED | OUTPATIENT
Start: 2017-09-06 | End: 2017-09-06

## 2017-09-06 RX ORDER — ASPIRIN/CALCIUM CARB/MAGNESIUM 324 MG
325 TABLET ORAL ONCE
Qty: 0 | Refills: 0 | Status: COMPLETED | OUTPATIENT
Start: 2017-09-06 | End: 2017-09-06

## 2017-09-06 RX ORDER — METOPROLOL TARTRATE 50 MG
25 TABLET ORAL DAILY
Qty: 0 | Refills: 0 | Status: DISCONTINUED | OUTPATIENT
Start: 2017-09-06 | End: 2017-09-07

## 2017-09-06 RX ADMIN — Medication 25 MILLIGRAM(S): at 05:55

## 2017-09-06 RX ADMIN — Medication 50 MILLIGRAM(S): at 05:55

## 2017-09-06 RX ADMIN — Medication 325 MILLIGRAM(S): at 05:54

## 2017-09-06 RX ADMIN — Medication 40 MILLIGRAM(S): at 05:56

## 2017-09-06 RX ADMIN — SODIUM CHLORIDE 1 GRAM(S): 9 INJECTION INTRAMUSCULAR; INTRAVENOUS; SUBCUTANEOUS at 21:41

## 2017-09-06 RX ADMIN — TICAGRELOR 90 MILLIGRAM(S): 90 TABLET ORAL at 20:40

## 2017-09-06 RX ADMIN — SODIUM CHLORIDE 1 GRAM(S): 9 INJECTION INTRAMUSCULAR; INTRAVENOUS; SUBCUTANEOUS at 05:54

## 2017-09-06 RX ADMIN — TICAGRELOR 90 MILLIGRAM(S): 90 TABLET ORAL at 05:54

## 2017-09-06 RX ADMIN — LISINOPRIL 5 MILLIGRAM(S): 2.5 TABLET ORAL at 05:56

## 2017-09-06 RX ADMIN — Medication 50 MILLILITER(S): at 07:43

## 2017-09-06 RX ADMIN — SODIUM CHLORIDE 1 GRAM(S): 9 INJECTION INTRAMUSCULAR; INTRAVENOUS; SUBCUTANEOUS at 14:44

## 2017-09-06 RX ADMIN — Medication 325 MILLIGRAM(S): at 00:46

## 2017-09-06 RX ADMIN — Medication 60 MILLIGRAM(S): at 00:15

## 2017-09-06 RX ADMIN — AMLODIPINE BESYLATE 10 MILLIGRAM(S): 2.5 TABLET ORAL at 05:55

## 2017-09-06 RX ADMIN — Medication 40 MILLIGRAM(S): at 20:41

## 2017-09-06 RX ADMIN — Medication 325 MILLIGRAM(S): at 20:41

## 2017-09-06 RX ADMIN — Medication 81 MILLIGRAM(S): at 12:24

## 2017-09-06 RX ADMIN — Medication 50 MILLIGRAM(S): at 21:41

## 2017-09-06 NOTE — ED ADULT NURSE REASSESSMENT NOTE - NS ED NURSE REASSESS COMMENT FT1
Pt c/o feeling like her sugar is low. Fingerstick done 51 mg/dl. Doctor on call notify. Dr Mireles Dextrose to be given and breakfast and oral fluids given to the patient.
Pt given breakfast and the Dextrose IV . States she feels better. Pt remains on the monitor sinus elodia to NSR with PVC's. She is able to speak full sentences and denies pain or SOB

## 2017-09-06 NOTE — H&P ADULT - ASSESSMENT
85 years old female who is admitted for acute on chronic left diastolic CHF, hyperglycemia from DM II, leukopenia  Plan: IV Lasix. DM meds and monitor WBC.

## 2017-09-06 NOTE — ED PROVIDER NOTE - OBJECTIVE STATEMENT
Pertinent PMH/PSH/FHx/SHx and Review of Systems contained within:  Patient presents to the ED for shortness of breath x1 day.  Was seen by nephrology Dr. Andrews today, given more diuretics in addition to her present dose which is not provided relief.  Denies chest pain, does complain of increased LE edema.  Also reports decreased urine output.    Relevant PMHx/SHx/SOCHx/FAMH:  CKD, CAD/MI s/p stents, HTN, DM, right foot amputation, right eye blindness  Patient denies EtOH/tobacco/illicit substance use.  PMD: Dr. Walker    ROS: No fever/chills, No headache/photophobia/eye pain/changes in vision, No ear pain/sore throat/dysphagia, No chest pain/palpitations, no cough/wheeze/stridor, No abdominal pain, No N/V/D/melena, no dysuria/frequency/discharge, No neck/back pain, no rash, no changes in neurological status/function.

## 2017-09-06 NOTE — H&P ADULT - NSHPLABSRESULTS_GEN_ALL_CORE
9.6    2.1   )-----------( 241      ( 05 Sep 2017 23:48 )             30.7     09-05    124<L>  |  90<L>  |  32<H>  ----------------------------<  78  5.2   |  24  |  1.93<H>    Ca    9.2      05 Sep 2017 23:48    TPro  8.2  /  Alb  3.0<L>  /  TBili  0.4  /  DBili  x   /  AST  57<H>  /  ALT  57  /  AlkPhos  90  09-05    PT/INR - ( 05 Sep 2017 23:48 )   PT: 11.7 sec;   INR: 1.07 ratio         PTT - ( 05 Sep 2017 23:48 )  PTT:29.4 sec    CAPILLARY BLOOD GLUCOSE  130 (06 Sep 2017 12:31)  194 (06 Sep 2017 09:36)  51 (06 Sep 2017 07:33)

## 2017-09-06 NOTE — H&P ADULT - HISTORY OF PRESENT ILLNESS
85 years old female comes to ER for dyspnea and leg swelling. No CP, no fever  LVEF of 55% on 03/16/17

## 2017-09-06 NOTE — ED PROVIDER NOTE - MUSCULOSKELETAL, MLM
Spine appears normal, range of motion is not limited, no muscle or joint tenderness Spine appears normal, range of motion is not limited, no muscle or joint tenderness, right midfoot amputation

## 2017-09-06 NOTE — H&P ADULT - NSHPPHYSICALEXAM_GEN_ALL_CORE
GENERAL: NAD, well-groomed, well-developed  HEAD:  Atraumatic, Normocephalic  EYES: EOMI, PERRLA, conjunctiva and sclera clear  ENMT: No tonsillar erythema, exudates, or enlargement; Moist mucous membranes, Good dentition, No lesions  NECK: Supple, No JVD, Normal thyroid  NERVOUS SYSTEM:  Alert & Oriented X3, Good concentration; Motor Strength 5/5 B/L upper and lower extremities; DTRs 2+ intact and symmetric  CHEST/LUNG: B/L rales at the base  HEART: Regular rate and rhythm; No murmurs, rubs, or gallops  ABDOMEN: Soft, Nontender, Nondistended; Bowel sounds present  EXTREMITIES:  B/L edema  LYMPH: No lymphadenopathy notedRECTAL: No masses, prostate normal size and smooth, Guiac negative   BREAST: No palpatble masses, skin no lesions no retractions, no discharages. adenexa no palpable masses noted   GYN: uterus normal size, adenexa no palpable masses, no CMT, no uterine discharge   SKIN: No rashes or lesions

## 2017-09-06 NOTE — H&P ADULT - CLICK TO LAUNCH ORM
Desiree Dunn is a 51 year old female presenting for   Chief Complaint   Patient presents with   • Pre-Op Exam     Denies Latex allergy or sensitivity.    Medication verified, no changes  Refills needed today: Yes    Health Maintenance Summary     Topic Due On Due Status Completed On    MAMMOGRAM - BREAST CANCER SCREENING Jun 19, 2019 Not Due Jun 19, 2017    Pap Smear - Cervical Cancer Screening  May 2, 2021 Not Due May 2, 2016    Colorectal Cancer Screening - Colonoscopy Jun 2, 2022 Not Due Jun 2, 2017    Immunization - TDAP Pregnancy  Hidden     IMMUNIZATION - DTaP/Tdap/Td May 2, 2026 Not Due May 2, 2016    Immunization-Influenza Sep 1, 2017 Not Due             Patient is up to date, no discussion needed.       .

## 2017-09-06 NOTE — ED PROVIDER NOTE - MEDICAL DECISION MAKING DETAILS
Shortness of breath, hyponatremia, Trop + but lower than previous.  Patient given more lasix in ER.  Patient is to be admitted to the hospital and the case was discussed with the admitting physician.  Any changes in plan, additional imaging/labs, and further work up will be at the discretion of the admitting physician.  Patient remained stable in my care.

## 2017-09-07 ENCOUNTER — TRANSCRIPTION ENCOUNTER (OUTPATIENT)
Age: 82
End: 2017-09-07

## 2017-09-07 VITALS — WEIGHT: 139.11 LBS

## 2017-09-07 DIAGNOSIS — D72.819 DECREASED WHITE BLOOD CELL COUNT, UNSPECIFIED: ICD-10-CM

## 2017-09-07 LAB
ANION GAP SERPL CALC-SCNC: 10 MMOL/L — SIGNIFICANT CHANGE UP (ref 5–17)
BASOPHILS # BLD AUTO: 0 K/UL — SIGNIFICANT CHANGE UP (ref 0–0.2)
BASOPHILS NFR BLD AUTO: 1.7 % — SIGNIFICANT CHANGE UP (ref 0–2)
BUN SERPL-MCNC: 32 MG/DL — HIGH (ref 7–23)
CALCIUM SERPL-MCNC: 9.1 MG/DL — SIGNIFICANT CHANGE UP (ref 8.5–10.1)
CHLORIDE SERPL-SCNC: 93 MMOL/L — LOW (ref 96–108)
CK SERPL-CCNC: 54 U/L — SIGNIFICANT CHANGE UP (ref 26–192)
CO2 SERPL-SCNC: 26 MMOL/L — SIGNIFICANT CHANGE UP (ref 22–31)
CREAT SERPL-MCNC: 2.05 MG/DL — HIGH (ref 0.5–1.3)
EOSINOPHIL # BLD AUTO: 0.1 K/UL — SIGNIFICANT CHANGE UP (ref 0–0.5)
EOSINOPHIL NFR BLD AUTO: 2.8 % — SIGNIFICANT CHANGE UP (ref 0–6)
GLUCOSE SERPL-MCNC: 195 MG/DL — HIGH (ref 70–99)
HCT VFR BLD CALC: 30.2 % — LOW (ref 34.5–45)
HGB BLD-MCNC: 9.4 G/DL — LOW (ref 11.5–15.5)
LYMPHOCYTES # BLD AUTO: 0.5 K/UL — LOW (ref 1–3.3)
LYMPHOCYTES # BLD AUTO: 24.9 % — SIGNIFICANT CHANGE UP (ref 13–44)
MCHC RBC-ENTMCNC: 28 PG — SIGNIFICANT CHANGE UP (ref 27–34)
MCHC RBC-ENTMCNC: 31.2 GM/DL — LOW (ref 32–36)
MCV RBC AUTO: 89.8 FL — SIGNIFICANT CHANGE UP (ref 80–100)
MONOCYTES # BLD AUTO: 0.3 K/UL — SIGNIFICANT CHANGE UP (ref 0–0.9)
MONOCYTES NFR BLD AUTO: 15.8 % — HIGH (ref 2–14)
NEUTROPHILS # BLD AUTO: 1.1 K/UL — LOW (ref 1.8–7.4)
NEUTROPHILS NFR BLD AUTO: 54.8 % — SIGNIFICANT CHANGE UP (ref 43–77)
NT-PROBNP SERPL-SCNC: HIGH PG/ML (ref 0–450)
PLATELET # BLD AUTO: 217 K/UL — SIGNIFICANT CHANGE UP (ref 150–400)
POTASSIUM SERPL-MCNC: 4.3 MMOL/L — SIGNIFICANT CHANGE UP (ref 3.5–5.3)
POTASSIUM SERPL-SCNC: 4.3 MMOL/L — SIGNIFICANT CHANGE UP (ref 3.5–5.3)
RBC # BLD: 3.36 M/UL — LOW (ref 3.8–5.2)
RBC # FLD: 14.4 % — SIGNIFICANT CHANGE UP (ref 11–15)
SODIUM SERPL-SCNC: 129 MMOL/L — LOW (ref 135–145)
TROPONIN I SERPL-MCNC: 1.56 NG/ML — HIGH (ref 0.01–0.04)
WBC # BLD: 2 K/UL — LOW (ref 3.8–10.5)
WBC # FLD AUTO: 2 K/UL — LOW (ref 3.8–10.5)

## 2017-09-07 RX ORDER — DEXTROSE 50 % IN WATER 50 %
25 SYRINGE (ML) INTRAVENOUS ONCE
Qty: 0 | Refills: 0 | Status: DISCONTINUED | OUTPATIENT
Start: 2017-09-07 | End: 2017-09-07

## 2017-09-07 RX ORDER — SODIUM CHLORIDE 9 MG/ML
1000 INJECTION, SOLUTION INTRAVENOUS
Qty: 0 | Refills: 0 | Status: DISCONTINUED | OUTPATIENT
Start: 2017-09-07 | End: 2017-09-07

## 2017-09-07 RX ORDER — INSULIN GLARGINE 100 [IU]/ML
14 INJECTION, SOLUTION SUBCUTANEOUS EVERY MORNING
Qty: 0 | Refills: 0 | Status: DISCONTINUED | OUTPATIENT
Start: 2017-09-07 | End: 2017-09-07

## 2017-09-07 RX ORDER — DEXTROSE 50 % IN WATER 50 %
12.5 SYRINGE (ML) INTRAVENOUS ONCE
Qty: 0 | Refills: 0 | Status: DISCONTINUED | OUTPATIENT
Start: 2017-09-07 | End: 2017-09-07

## 2017-09-07 RX ORDER — RANOLAZINE 500 MG/1
1 TABLET, FILM COATED, EXTENDED RELEASE ORAL
Qty: 60 | Refills: 0 | OUTPATIENT
Start: 2017-09-07 | End: 2017-10-07

## 2017-09-07 RX ORDER — DEXTROSE 50 % IN WATER 50 %
1 SYRINGE (ML) INTRAVENOUS ONCE
Qty: 0 | Refills: 0 | Status: DISCONTINUED | OUTPATIENT
Start: 2017-09-07 | End: 2017-09-07

## 2017-09-07 RX ORDER — GLUCAGON INJECTION, SOLUTION 0.5 MG/.1ML
1 INJECTION, SOLUTION SUBCUTANEOUS ONCE
Qty: 0 | Refills: 0 | Status: DISCONTINUED | OUTPATIENT
Start: 2017-09-07 | End: 2017-09-07

## 2017-09-07 RX ORDER — RANOLAZINE 500 MG/1
500 TABLET, FILM COATED, EXTENDED RELEASE ORAL
Qty: 0 | Refills: 0 | Status: DISCONTINUED | OUTPATIENT
Start: 2017-09-07 | End: 2017-09-07

## 2017-09-07 RX ORDER — INSULIN GLARGINE 100 [IU]/ML
14 INJECTION, SOLUTION SUBCUTANEOUS ONCE
Qty: 0 | Refills: 0 | Status: DISCONTINUED | OUTPATIENT
Start: 2017-09-07 | End: 2017-09-07

## 2017-09-07 RX ORDER — INSULIN LISPRO 100/ML
VIAL (ML) SUBCUTANEOUS
Qty: 0 | Refills: 0 | Status: DISCONTINUED | OUTPATIENT
Start: 2017-09-07 | End: 2017-09-07

## 2017-09-07 RX ORDER — INSULIN LISPRO 100/ML
VIAL (ML) SUBCUTANEOUS AT BEDTIME
Qty: 0 | Refills: 0 | Status: DISCONTINUED | OUTPATIENT
Start: 2017-09-07 | End: 2017-09-07

## 2017-09-07 RX ADMIN — ISOSORBIDE MONONITRATE 30 MILLIGRAM(S): 60 TABLET, EXTENDED RELEASE ORAL at 11:54

## 2017-09-07 RX ADMIN — Medication 50 MILLIGRAM(S): at 15:06

## 2017-09-07 RX ADMIN — Medication 40 MILLIGRAM(S): at 06:14

## 2017-09-07 RX ADMIN — SODIUM CHLORIDE 1 GRAM(S): 9 INJECTION INTRAMUSCULAR; INTRAVENOUS; SUBCUTANEOUS at 06:14

## 2017-09-07 RX ADMIN — INSULIN GLARGINE 14 UNIT(S): 100 INJECTION, SOLUTION SUBCUTANEOUS at 17:28

## 2017-09-07 RX ADMIN — Medication 3: at 17:28

## 2017-09-07 RX ADMIN — Medication 4: at 11:58

## 2017-09-07 RX ADMIN — RANOLAZINE 500 MILLIGRAM(S): 500 TABLET, FILM COATED, EXTENDED RELEASE ORAL at 17:35

## 2017-09-07 RX ADMIN — TICAGRELOR 90 MILLIGRAM(S): 90 TABLET ORAL at 06:29

## 2017-09-07 RX ADMIN — TICAGRELOR 90 MILLIGRAM(S): 90 TABLET ORAL at 17:33

## 2017-09-07 RX ADMIN — Medication 40 MILLIGRAM(S): at 17:30

## 2017-09-07 RX ADMIN — Medication 81 MILLIGRAM(S): at 11:54

## 2017-09-07 RX ADMIN — SODIUM CHLORIDE 1 GRAM(S): 9 INJECTION INTRAMUSCULAR; INTRAVENOUS; SUBCUTANEOUS at 15:06

## 2017-09-07 RX ADMIN — Medication 325 MILLIGRAM(S): at 06:17

## 2017-09-07 RX ADMIN — Medication 25 MILLIGRAM(S): at 06:14

## 2017-09-07 RX ADMIN — Medication 325 MILLIGRAM(S): at 17:32

## 2017-09-07 NOTE — DISCHARGE NOTE ADULT - PLAN OF CARE
no further complications Please follow up with DR Mendoza next week,  monitor your weight, continue diuretics as prescribed continue with aspirin and Brilinta continue with BP medications continue with lantus continue with sodium tablets

## 2017-09-07 NOTE — PROGRESS NOTE ADULT - SUBJECTIVE AND OBJECTIVE BOX
FEELS WELL NO DYSPNEA OR ANGINA  TROPONIN(S) FALLING  NO JVD   CLEAR LUNGS  NORMAL S1  EDEMA    ASHD  NSTEMI  chronic kidney disease  RECENT PCI    ADD RANOLAZINE  ANTICIPATE DISCHARGE IN 24 - 48 HOURS WITH CLOSE FOLLOW UP    DAYO COTTON MD, FACC

## 2017-09-07 NOTE — PHYSICAL THERAPY INITIAL EVALUATION ADULT - PLANNED THERAPY INTERVENTIONS, PT EVAL
transfer training/bed mobility training/gait training/balance training/postural re-education/strengthening

## 2017-09-07 NOTE — PHYSICAL THERAPY INITIAL EVALUATION ADULT - ADDITIONAL COMMENTS
Patient lives in a private house with 4 steps to enter with L handrail. Patient owns a rolling walker and several canes. Patient states her daughter lives on the next block and comes to her home daily after her work day (daughter is a ), and has a private aide 5 days a week 4 hours per day.

## 2017-09-07 NOTE — DISCHARGE NOTE ADULT - MEDICATION SUMMARY - MEDICATIONS TO TAKE
I will START or STAY ON the medications listed below when I get home from the hospital:    Aspirin Enteric Coated 81 mg oral delayed release tablet  -- 1 tab(s) by mouth once a day  -- Indication: For cad    ramipril 1.25 mg oral capsule  -- 1 cap(s) by mouth once a day  -- Indication: For hhypertension    sodium bicarbonate 325 mg oral tablet  -- 1 tab(s) by mouth 2 times a day  -- Indication: For hyponatremia    ranolazine 500 mg oral tablet, extended release  -- 1 tab(s) by mouth 2 times a day  -- Indication: For CAD    Lantus  -- 14 unit(s) subcutaneous once a day in AM  -- Indication: For diabetes    Brilinta (ticagrelor) 90 mg oral tablet  -- 1 tab(s) by mouth 2 times a day  -- Indication: For CAD    metoprolol succinate 25 mg oral tablet, extended release  -- 1 tab(s) by mouth once a day  -- Indication: For Hypertension    amLODIPine 10 mg oral tablet  -- 1 tab(s) by mouth once a day  -- Indication: For hypertension    metOLazone 10 mg oral tablet  -- 1 tab(s) by mouth once a day  -- Indication: For diuretic    Lasix 40 mg oral tablet  -- 60 milligram(s) by mouth once a day  -- Indication: For diuretic    hydrALAZINE 50 mg oral tablet  --  by mouth every 8 hours  -- Indication: For hypertension

## 2017-09-07 NOTE — DISCHARGE NOTE ADULT - CARE PROVIDER_API CALL
Marvin Mendoza), Cardiology; Internal Medicine; Nuclear Cardiology  [address] [place] [address]  Phone: [phone]  Fax: [phone]    Stew Walker (), Family Medicine  [address] [place] [address]  Phone: [phone]  Fax: [phone]

## 2017-09-07 NOTE — CONSULT NOTE ADULT - PROBLEM SELECTOR RECOMMENDATION 9
- patient states she is aware of abnormal blood counts and so is PMD  - patient wants to go home and follow up as outpatient  - d/w patient shee needs to follow up with some one as outpatient to investigate abnromal blood counts

## 2017-09-07 NOTE — DISCHARGE NOTE ADULT - MEDICATION SUMMARY - MEDICATIONS TO CHANGE
I will SWITCH the dose or number of times a day I take the medications listed below when I get home from the hospital:    Lasix 40 mg oral tablet  -- 60 milligram(s) by mouth once a day

## 2017-09-07 NOTE — PROGRESS NOTE ADULT - SUBJECTIVE AND OBJECTIVE BOX
Patient is a 85y old  Female who presents with a chief complaint of Dyspnea (06 Sep 2017 14:44)    MEDICAL PROBLEMS:  PULMONARY VASCULAR CONGESTION  SHORTNESS OF BREATH  CAD S/P percutaneous coronary angioplasty  PVD (peripheral vascular disease)  HTN (hypertension)  DM (diabetes mellitus screen)  Pulmonary vascular congestion  Leukopenia      INTERVAL HPI/OVERNIGHT EVENTS: CHF is improving with iv Lasix. Seen by hematologist for leukocytopenia    MEDICATIONS  (STANDING):  aspirin enteric coated 81 milliGRAM(s) Oral daily  lisinopril 5 milliGRAM(s) Oral daily  ticagrelor 90 milliGRAM(s) Oral two times a day  metoprolol succinate ER 25 milliGRAM(s) Oral daily  amLODIPine   Tablet 10 milliGRAM(s) Oral daily  hydrALAZINE 50 milliGRAM(s) Oral every 8 hours  metolazone 10 milliGRAM(s) Oral daily  furosemide   Injectable 40 milliGRAM(s) IV Push every 12 hours  sodium chloride 1 Gram(s) Oral every 8 hours  sodium bicarbonate 325 milliGRAM(s) Oral two times a day  isosorbide   mononitrate ER Tablet (IMDUR) 30 milliGRAM(s) Oral daily  insulin lispro (HumaLOG) corrective regimen sliding scale   SubCutaneous three times a day before meals  insulin lispro (HumaLOG) corrective regimen sliding scale   SubCutaneous at bedtime  dextrose 5%. 1000 milliLiter(s) (50 mL/Hr) IV Continuous <Continuous>  dextrose 50% Injectable 12.5 Gram(s) IV Push once  dextrose 50% Injectable 25 Gram(s) IV Push once  dextrose 50% Injectable 25 Gram(s) IV Push once  ranolazine 500 milliGRAM(s) Oral two times a day  insulin glargine Injectable (LANTUS) 14 Unit(s) SubCutaneous every morning    MEDICATIONS  (PRN):  dextrose Gel 1 Dose(s) Oral once PRN Blood Glucose LESS THAN 70 milliGRAM(s)/deciliter  glucagon  Injectable 1 milliGRAM(s) IntraMuscular once PRN Glucose LESS THAN 70 milligrams/deciliter    Allergies    codeine (Other)    Intolerances      Vital Signs Last 24 Hrs  T(C): 37 (07 Sep 2017 11:08), Max: 37 (07 Sep 2017 11:08)  T(F): 98.6 (07 Sep 2017 11:08), Max: 98.6 (07 Sep 2017 11:08)  HR: 62 (07 Sep 2017 13:05) (56 - 62)  BP: 121/56 (07 Sep 2017 11:08) (121/54 - 144/49)  BP(mean): --  RR: 18 (07 Sep 2017 11:08) (16 - 20)  SpO2: 98% (07 Sep 2017 11:08) (97% - 98%)    PHYSICAL EXAM:  GENERAL: NAD, well-groomed, well-developed  HEAD:  Atraumatic, Normocephalic  EYES: EOMI, PERRLA, conjunctiva and sclera clear  ENMT: No tonsillar erythema, exudates, or enlargement; Moist mucous membranes, Good dentition, No lesions  NECK: Supple, No JVD, Normal thyroid  NERVOUS SYSTEM:  Alert & Oriented X3, Good concentration; Motor Strength 5/5 B/L upper and lower extremities; DTRs 2+ intact and symmetric  CHEST/LUNG: Clear to percussion bilaterally; No rales, rhonchi, wheezing, or rubs  HEART: Regular rate and rhythm; No murmurs, rubs, or gallops  ABDOMEN: Soft, Nontender, Nondistended; Bowel sounds present  EXTREMITIES:  2+ Peripheral Pulses, No clubbing, cyanosis, or edema  LYMPH: No lymphadenopathy noted  SKIN: No rashes or lesions     @ 07:01  -   @ 07:00  --------------------------------------------------------  IN: 200 mL / OUT: 0 mL / NET: 200 mL        LABS:                        9.4    2.0   )-----------( 217      ( 07 Sep 2017 08:16 )             30.2         129<L>  |  93<L>  |  32<H>  ----------------------------<  195<H>  4.3   |  26  |  2.05<H>    Ca    9.1      07 Sep 2017 08:16    TPro  8.2  /  Alb  3.0<L>  /  TBili  0.4  /  DBili  x   /  AST  57<H>  /  ALT  57  /  AlkPhos  90  0905    PT/INR - ( 05 Sep 2017 23:48 )   PT: 11.7 sec;   INR: 1.07 ratio         PTT - ( 05 Sep 2017 23:48 )  PTT:29.4 sec  Urinalysis Basic - ( 06 Sep 2017 20:51 )    Color: Yellow / Appearance: Clear / S.005 / pH: x  Gluc: x / Ketone: Negative  / Bili: Negative / Urobili: Negative mg/dL   Blood: x / Protein: 30 mg/dL / Nitrite: Negative   Leuk Esterase: Small / RBC: 3-5 /HPF / WBC 26-50   Sq Epi: x / Non Sq Epi: Moderate / Bacteria: Many      CAPILLARY BLOOD GLUCOSE  305 (07 Sep 2017 11:51)  208 (07 Sep 2017 07:49)  200 (06 Sep 2017 18:54)        Troponin I, Serum: 1.560 ng/mL ( @ 08:16)  Troponin I, Serum: 1.590 ng/mL ( @ 23:48)  CPK Mass Assay %: 1.1 % ( @ 23:48)  Troponin I, Serum: 3.720 ng/mL (08-10 @ 07:25)  Troponin I, Serum: 3.880 ng/mL ( @ 20:41)    Cultures:            RADIOLOGY & ADDITIONAL TESTS:    Imaging Personally Reviewed:  [X] YES  [ ] NO    Consultant(s) Notes Reviewed:  [X] YES  [ ] NO    Care Discussed with Consultants/Other Providers [X] YES  [ ] NO

## 2017-09-07 NOTE — CONSULT NOTE ADULT - SUBJECTIVE AND OBJECTIVE BOX
Reason for Consultation: leukopenia    HPI: Patient is a 85y Female seen on consultatioin for the evaluation and management of leukopenia    85 years old female comes to ER for dyspnea and leg swelling. No CP, no fever  LVEF of 55% on 17, Hematology consult called for leukopenia, patient states she knows about abnormal blood counts, and PMD aware of them, never seen a hematologist in past, says eats well    PAST MEDICAL & SURGICAL HISTORY:  CAD S/P percutaneous coronary angioplasty  PVD (peripheral vascular disease)  HTN (hypertension)  DM (diabetes mellitus screen)  S/P foot surgery: left foot surgery with sage s/p fx.  S/P cataract surgery  S/P appendectomy  S/P hysterectomy  Amputation foot, unilat        MEDICATIONS  (STANDING):  aspirin enteric coated 81 milliGRAM(s) Oral daily  lisinopril 5 milliGRAM(s) Oral daily  ticagrelor 90 milliGRAM(s) Oral two times a day  metoprolol succinate ER 25 milliGRAM(s) Oral daily  amLODIPine   Tablet 10 milliGRAM(s) Oral daily  hydrALAZINE 50 milliGRAM(s) Oral every 8 hours  metolazone 10 milliGRAM(s) Oral daily  furosemide   Injectable 40 milliGRAM(s) IV Push every 12 hours  sodium chloride 1 Gram(s) Oral every 8 hours  sodium bicarbonate 325 milliGRAM(s) Oral two times a day  isosorbide   mononitrate ER Tablet (IMDUR) 30 milliGRAM(s) Oral daily    MEDICATIONS  (PRN):      Allergies    codeine (Other)    Intolerances        SOCIAL HISTORY:    Smoking Status: denies  Alcohol: darcy  Marital Status : denies  Occupation: retired    FAMILY HISTORY:  No pertinent family history in first degree relatives      	    Vital Signs Last 24 Hrs  T(C): 36.4 (07 Sep 2017 05:21), Max: 36.8 (06 Sep 2017 21:18)  T(F): 97.6 (07 Sep 2017 05:21), Max: 98.3 (06 Sep 2017 21:18)  HR: 60 (07 Sep 2017 08:15) (56 - 62)  BP: 121/54 (07 Sep 2017 08:15) (104/53 - 144/49)  BP(mean): --  RR: 16 (07 Sep 2017 08:15) (16 - 20)  SpO2: 98% (07 Sep 2017 08:15) (96% - 98%)    PHYSICAL EXAM:    general - AAO x 3  HEENT - No Icterus  CVS - RRR  RS - AE B/L  Abd - soft, NT  Ext - Pulses +        LABS:                        9.4    2.0   )-----------( 217      ( 07 Sep 2017 08:16 )             30.2     09-    129<L>  |  93<L>  |  32<H>  ----------------------------<  195<H>  4.3   |  26  |  2.05<H>    Ca    9.1      07 Sep 2017 08:16    TPro  8.2  /  Alb  3.0<L>  /  TBili  0.4  /  DBili  x   /  AST  57<H>  /  ALT  57  /  AlkPhos  90  09-05    PT/INR - ( 05 Sep 2017 23:48 )   PT: 11.7 sec;   INR: 1.07 ratio         PTT - ( 05 Sep 2017 23:48 )  PTT:29.4 sec  Urinalysis Basic - ( 06 Sep 2017 20:51 )    Color: Yellow / Appearance: Clear / S.005 / pH: x  Gluc: x / Ketone: Negative  / Bili: Negative / Urobili: Negative mg/dL   Blood: x / Protein: 30 mg/dL / Nitrite: Negative   Leuk Esterase: Small / RBC: 3-5 /HPF / WBC 26-50   Sq Epi: x / Non Sq Epi: Moderate / Bacteria: Many        RADIOLOGY & ADDITIONAL STUDIES:

## 2017-09-07 NOTE — PHYSICAL THERAPY INITIAL EVALUATION ADULT - GAIT DEVIATIONS NOTED, PT EVAL
decreased step length/decreased stride length/decreased shanthi/increased stride width/decreased weight-shifting ability

## 2017-09-07 NOTE — PHYSICAL THERAPY INITIAL EVALUATION ADULT - CRITERIA FOR SKILLED THERAPEUTIC INTERVENTIONS
Home with Home P.T./rehab potential/therapy frequency/risk reduction/prevention/anticipated discharge recommendation/predicted duration of therapy intervention/functional limitations in following categories/impairments found

## 2017-09-07 NOTE — DISCHARGE NOTE ADULT - HOSPITAL COURSE
LVEF of 55% on 03/16/17  85 YEAR OLD WITH DIABETES MELLITUS,  chronic kidney disease, ASHD, RECENT PCI IN SETTING OF NSTEMI, LV DYSFUNCTION, PERIPHERAL VASCUALAR DISEASE;  WITH WORSENING DYSPNEA, NO ANGINA; + TROPONINS, ECG:NSR FIRST DEGREE AV BLOCK BIFASICULAR BLOCK: LAFB/RBBB NO ISCHEMIA,    CHEST XRAY:: CARDIOMEGALY AND CHF/CONGESTION; ANEMIC, hyponatremic   Patient diuresed, cardiology consult, hematology consult   Patient to discharge home, follow up with DR Mendoza next week.   patient has home nursing services in place from [facility]

## 2017-09-07 NOTE — DISCHARGE NOTE ADULT - CARE PLAN
Principal Discharge DX:	Pulmonary vascular congestion  Goal:	no further complications  Instructions for follow-up, activity and diet:	Please follow up with DR Mendoza next week,  monitor your weight, continue diuretics as prescribed  Secondary Diagnosis:	CAD S/P percutaneous coronary angioplasty  Instructions for follow-up, activity and diet:	continue with aspirin and Brilinta  Secondary Diagnosis:	HTN (hypertension)  Instructions for follow-up, activity and diet:	continue with BP medications  Secondary Diagnosis:	Diabetes  Instructions for follow-up, activity and diet:	continue with lantus  Secondary Diagnosis:	Hyponatremia  Instructions for follow-up, activity and diet:	continue with sodium tablets

## 2017-09-07 NOTE — DISCHARGE NOTE ADULT - PATIENT PORTAL LINK FT
“You can access the Follow[facility] Patient Portal, offered by [facility], by registering with the following website: [email or web address]”

## 2017-09-09 LAB
-  AMPICILLIN: SIGNIFICANT CHANGE UP
-  CIPROFLOXACIN: SIGNIFICANT CHANGE UP
-  NITROFURANTOIN: SIGNIFICANT CHANGE UP
-  TETRACYCLINE: SIGNIFICANT CHANGE UP
-  VANCOMYCIN: SIGNIFICANT CHANGE UP
CULTURE RESULTS: SIGNIFICANT CHANGE UP
METHOD TYPE: SIGNIFICANT CHANGE UP
ORGANISM # SPEC MICROSCOPIC CNT: SIGNIFICANT CHANGE UP
ORGANISM # SPEC MICROSCOPIC CNT: SIGNIFICANT CHANGE UP
SPECIMEN SOURCE: SIGNIFICANT CHANGE UP

## 2017-09-11 DIAGNOSIS — I13.0 HYPERTENSIVE HEART AND CHRONIC KIDNEY DISEASE WITH HEART FAILURE AND STAGE 1 THROUGH STAGE 4 CHRONIC KIDNEY DISEASE, OR UNSPECIFIED CHRONIC KIDNEY DISEASE: ICD-10-CM

## 2017-09-11 DIAGNOSIS — I50.23 ACUTE ON CHRONIC SYSTOLIC (CONGESTIVE) HEART FAILURE: ICD-10-CM

## 2017-09-11 DIAGNOSIS — I25.10 ATHEROSCLEROTIC HEART DISEASE OF NATIVE CORONARY ARTERY WITHOUT ANGINA PECTORIS: ICD-10-CM

## 2017-09-11 DIAGNOSIS — H54.41 BLINDNESS, RIGHT EYE, NORMAL VISION LEFT EYE: ICD-10-CM

## 2017-09-11 DIAGNOSIS — Z95.5 PRESENCE OF CORONARY ANGIOPLASTY IMPLANT AND GRAFT: ICD-10-CM

## 2017-09-11 DIAGNOSIS — Z90.710 ACQUIRED ABSENCE OF BOTH CERVIX AND UTERUS: ICD-10-CM

## 2017-09-11 DIAGNOSIS — N18.9 CHRONIC KIDNEY DISEASE, UNSPECIFIED: ICD-10-CM

## 2017-09-11 DIAGNOSIS — I25.2 OLD MYOCARDIAL INFARCTION: ICD-10-CM

## 2017-09-11 DIAGNOSIS — I45.2 BIFASCICULAR BLOCK: ICD-10-CM

## 2017-09-11 DIAGNOSIS — D64.9 ANEMIA, UNSPECIFIED: ICD-10-CM

## 2017-09-11 DIAGNOSIS — Z98.49 CATARACT EXTRACTION STATUS, UNSPECIFIED EYE: ICD-10-CM

## 2017-09-11 DIAGNOSIS — Z88.8 ALLERGY STATUS TO OTHER DRUGS, MEDICAMENTS AND BIOLOGICAL SUBSTANCES: ICD-10-CM

## 2017-09-11 DIAGNOSIS — Z79.82 LONG TERM (CURRENT) USE OF ASPIRIN: ICD-10-CM

## 2017-09-11 DIAGNOSIS — Z89.431 ACQUIRED ABSENCE OF RIGHT FOOT: ICD-10-CM

## 2017-09-11 DIAGNOSIS — R06.02 SHORTNESS OF BREATH: ICD-10-CM

## 2017-09-11 DIAGNOSIS — E11.65 TYPE 2 DIABETES MELLITUS WITH HYPERGLYCEMIA: ICD-10-CM

## 2017-09-11 DIAGNOSIS — E87.1 HYPO-OSMOLALITY AND HYPONATREMIA: ICD-10-CM

## 2017-09-11 DIAGNOSIS — E11.51 TYPE 2 DIABETES MELLITUS WITH DIABETIC PERIPHERAL ANGIOPATHY WITHOUT GANGRENE: ICD-10-CM

## 2017-09-11 DIAGNOSIS — D72.819 DECREASED WHITE BLOOD CELL COUNT, UNSPECIFIED: ICD-10-CM

## 2017-09-11 DIAGNOSIS — I44.0 ATRIOVENTRICULAR BLOCK, FIRST DEGREE: ICD-10-CM

## 2017-10-03 ENCOUNTER — INPATIENT (INPATIENT)
Facility: HOSPITAL | Age: 82
LOS: 7 days | Discharge: HOME HEALTH | End: 2017-10-11
Attending: FAMILY MEDICINE | Admitting: FAMILY MEDICINE
Payer: MEDICARE

## 2017-10-03 VITALS
RESPIRATION RATE: 21 BRPM | OXYGEN SATURATION: 98 % | HEIGHT: 64 IN | DIASTOLIC BLOOD PRESSURE: 54 MMHG | HEART RATE: 61 BPM | SYSTOLIC BLOOD PRESSURE: 134 MMHG | WEIGHT: 141.1 LBS | TEMPERATURE: 98 F

## 2017-10-03 LAB
ALBUMIN SERPL ELPH-MCNC: 3.1 G/DL — LOW (ref 3.3–5)
ALP SERPL-CCNC: 76 U/L — SIGNIFICANT CHANGE UP (ref 40–120)
ALT FLD-CCNC: 36 U/L — SIGNIFICANT CHANGE UP (ref 12–78)
ANION GAP SERPL CALC-SCNC: 12 MMOL/L — SIGNIFICANT CHANGE UP (ref 5–17)
ANISOCYTOSIS BLD QL: SLIGHT — SIGNIFICANT CHANGE UP
APPEARANCE UR: CLEAR — SIGNIFICANT CHANGE UP
AST SERPL-CCNC: 33 U/L — SIGNIFICANT CHANGE UP (ref 15–37)
BACTERIA # UR AUTO: ABNORMAL
BILIRUB SERPL-MCNC: 0.3 MG/DL — SIGNIFICANT CHANGE UP (ref 0.2–1.2)
BILIRUB UR-MCNC: NEGATIVE — SIGNIFICANT CHANGE UP
BUN SERPL-MCNC: 51 MG/DL — HIGH (ref 7–23)
CALCIUM SERPL-MCNC: 9 MG/DL — SIGNIFICANT CHANGE UP (ref 8.5–10.1)
CHLORIDE SERPL-SCNC: 84 MMOL/L — LOW (ref 96–108)
CK MB BLD-MCNC: 2.1 % — SIGNIFICANT CHANGE UP (ref 0–3.5)
CK MB CFR SERPL CALC: 1.7 NG/ML — SIGNIFICANT CHANGE UP (ref 0.5–3.6)
CK SERPL-CCNC: 81 U/L — SIGNIFICANT CHANGE UP (ref 26–192)
CO2 SERPL-SCNC: 24 MMOL/L — SIGNIFICANT CHANGE UP (ref 22–31)
COLOR SPEC: YELLOW — SIGNIFICANT CHANGE UP
CREAT SERPL-MCNC: 1.87 MG/DL — HIGH (ref 0.5–1.3)
DIFF PNL FLD: NEGATIVE — SIGNIFICANT CHANGE UP
EOSINOPHIL NFR BLD AUTO: 2 % — SIGNIFICANT CHANGE UP (ref 0–6)
EPI CELLS # UR: SIGNIFICANT CHANGE UP
GLUCOSE SERPL-MCNC: 77 MG/DL — SIGNIFICANT CHANGE UP (ref 70–99)
GLUCOSE UR QL: NEGATIVE MG/DL — SIGNIFICANT CHANGE UP
HCT VFR BLD CALC: 30.1 % — LOW (ref 34.5–45)
HGB BLD-MCNC: 9.7 G/DL — LOW (ref 11.5–15.5)
HYPOCHROMIA BLD QL: SLIGHT — SIGNIFICANT CHANGE UP
KETONES UR-MCNC: NEGATIVE — SIGNIFICANT CHANGE UP
LEUKOCYTE ESTERASE UR-ACNC: ABNORMAL
LYMPHOCYTES # BLD AUTO: 28 % — SIGNIFICANT CHANGE UP (ref 13–44)
MACROCYTES BLD QL: SLIGHT — SIGNIFICANT CHANGE UP
MCHC RBC-ENTMCNC: 28.3 PG — SIGNIFICANT CHANGE UP (ref 27–34)
MCHC RBC-ENTMCNC: 32.3 GM/DL — SIGNIFICANT CHANGE UP (ref 32–36)
MCV RBC AUTO: 87.6 FL — SIGNIFICANT CHANGE UP (ref 80–100)
MONOCYTES NFR BLD AUTO: 8 % — SIGNIFICANT CHANGE UP (ref 2–14)
NEUTROPHILS NFR BLD AUTO: 62 % — SIGNIFICANT CHANGE UP (ref 43–77)
NITRITE UR-MCNC: NEGATIVE — SIGNIFICANT CHANGE UP
NT-PROBNP SERPL-SCNC: HIGH PG/ML (ref 0–450)
PH UR: 8 — SIGNIFICANT CHANGE UP (ref 5–8)
PLAT MORPH BLD: NORMAL — SIGNIFICANT CHANGE UP
PLATELET # BLD AUTO: 234 K/UL — SIGNIFICANT CHANGE UP (ref 150–400)
POTASSIUM SERPL-MCNC: 4.2 MMOL/L — SIGNIFICANT CHANGE UP (ref 3.5–5.3)
POTASSIUM SERPL-SCNC: 4.2 MMOL/L — SIGNIFICANT CHANGE UP (ref 3.5–5.3)
PROT SERPL-MCNC: 8.1 GM/DL — SIGNIFICANT CHANGE UP (ref 6–8.3)
PROT UR-MCNC: 30 MG/DL
RBC # BLD: 3.44 M/UL — LOW (ref 3.8–5.2)
RBC # FLD: 13.2 % — SIGNIFICANT CHANGE UP (ref 11–15)
RBC BLD AUTO: ABNORMAL
RBC CASTS # UR COMP ASSIST: SIGNIFICANT CHANGE UP /HPF (ref 0–4)
SODIUM SERPL-SCNC: 120 MMOL/L — CRITICAL LOW (ref 135–145)
SP GR SPEC: 1.01 — SIGNIFICANT CHANGE UP (ref 1.01–1.02)
TARGETS BLD QL SMEAR: SLIGHT — SIGNIFICANT CHANGE UP
TROPONIN I SERPL-MCNC: 1.86 NG/ML — HIGH (ref 0.01–0.04)
UROBILINOGEN FLD QL: NEGATIVE MG/DL — SIGNIFICANT CHANGE UP
WBC # BLD: 2.1 K/UL — LOW (ref 3.8–10.5)
WBC # FLD AUTO: 2.1 K/UL — LOW (ref 3.8–10.5)
WBC UR QL: SIGNIFICANT CHANGE UP

## 2017-10-03 PROCEDURE — 99285 EMERGENCY DEPT VISIT HI MDM: CPT

## 2017-10-03 PROCEDURE — 71010: CPT | Mod: 26

## 2017-10-03 RX ORDER — METOPROLOL TARTRATE 50 MG
25 TABLET ORAL DAILY
Qty: 0 | Refills: 0 | Status: DISCONTINUED | OUTPATIENT
Start: 2017-10-03 | End: 2017-10-11

## 2017-10-03 RX ORDER — GLUCAGON INJECTION, SOLUTION 0.5 MG/.1ML
1 INJECTION, SOLUTION SUBCUTANEOUS ONCE
Qty: 0 | Refills: 0 | Status: DISCONTINUED | OUTPATIENT
Start: 2017-10-03 | End: 2017-10-11

## 2017-10-03 RX ORDER — FUROSEMIDE 40 MG
40 TABLET ORAL DAILY
Qty: 0 | Refills: 0 | Status: DISCONTINUED | OUTPATIENT
Start: 2017-10-03 | End: 2017-10-04

## 2017-10-03 RX ORDER — HYDRALAZINE HCL 50 MG
50 TABLET ORAL EVERY 8 HOURS
Qty: 0 | Refills: 0 | Status: DISCONTINUED | OUTPATIENT
Start: 2017-10-03 | End: 2017-10-11

## 2017-10-03 RX ORDER — INSULIN GLARGINE 100 [IU]/ML
14 INJECTION, SOLUTION SUBCUTANEOUS EVERY MORNING
Qty: 0 | Refills: 0 | Status: DISCONTINUED | OUTPATIENT
Start: 2017-10-03 | End: 2017-10-11

## 2017-10-03 RX ORDER — SODIUM BICARBONATE 1 MEQ/ML
325 SYRINGE (ML) INTRAVENOUS
Qty: 0 | Refills: 0 | Status: DISCONTINUED | OUTPATIENT
Start: 2017-10-03 | End: 2017-10-11

## 2017-10-03 RX ORDER — DEXTROSE 50 % IN WATER 50 %
1 SYRINGE (ML) INTRAVENOUS ONCE
Qty: 0 | Refills: 0 | Status: DISCONTINUED | OUTPATIENT
Start: 2017-10-03 | End: 2017-10-11

## 2017-10-03 RX ORDER — SODIUM CHLORIDE 9 MG/ML
1000 INJECTION, SOLUTION INTRAVENOUS
Qty: 0 | Refills: 0 | Status: DISCONTINUED | OUTPATIENT
Start: 2017-10-03 | End: 2017-10-11

## 2017-10-03 RX ORDER — SODIUM CHLORIDE 9 MG/ML
1000 INJECTION INTRAMUSCULAR; INTRAVENOUS; SUBCUTANEOUS
Qty: 0 | Refills: 0 | Status: DISCONTINUED | OUTPATIENT
Start: 2017-10-03 | End: 2017-10-04

## 2017-10-03 RX ORDER — SODIUM CHLORIDE 9 MG/ML
1000 INJECTION INTRAMUSCULAR; INTRAVENOUS; SUBCUTANEOUS ONCE
Qty: 0 | Refills: 0 | Status: COMPLETED | OUTPATIENT
Start: 2017-10-03 | End: 2017-10-03

## 2017-10-03 RX ORDER — DEXTROSE 50 % IN WATER 50 %
25 SYRINGE (ML) INTRAVENOUS ONCE
Qty: 0 | Refills: 0 | Status: DISCONTINUED | OUTPATIENT
Start: 2017-10-03 | End: 2017-10-11

## 2017-10-03 RX ORDER — ASPIRIN/CALCIUM CARB/MAGNESIUM 324 MG
325 TABLET ORAL ONCE
Qty: 0 | Refills: 0 | Status: COMPLETED | OUTPATIENT
Start: 2017-10-03 | End: 2017-10-03

## 2017-10-03 RX ORDER — LISINOPRIL 2.5 MG/1
5 TABLET ORAL DAILY
Qty: 0 | Refills: 0 | Status: DISCONTINUED | OUTPATIENT
Start: 2017-10-03 | End: 2017-10-04

## 2017-10-03 RX ORDER — RANOLAZINE 500 MG/1
500 TABLET, FILM COATED, EXTENDED RELEASE ORAL
Qty: 0 | Refills: 0 | Status: DISCONTINUED | OUTPATIENT
Start: 2017-10-03 | End: 2017-10-11

## 2017-10-03 RX ORDER — TICAGRELOR 90 MG/1
90 TABLET ORAL
Qty: 0 | Refills: 0 | Status: DISCONTINUED | OUTPATIENT
Start: 2017-10-03 | End: 2017-10-11

## 2017-10-03 RX ORDER — DEXTROSE 50 % IN WATER 50 %
12.5 SYRINGE (ML) INTRAVENOUS ONCE
Qty: 0 | Refills: 0 | Status: DISCONTINUED | OUTPATIENT
Start: 2017-10-03 | End: 2017-10-11

## 2017-10-03 RX ORDER — INSULIN LISPRO 100/ML
VIAL (ML) SUBCUTANEOUS
Qty: 0 | Refills: 0 | Status: DISCONTINUED | OUTPATIENT
Start: 2017-10-03 | End: 2017-10-11

## 2017-10-03 RX ORDER — ASPIRIN/CALCIUM CARB/MAGNESIUM 324 MG
81 TABLET ORAL DAILY
Qty: 0 | Refills: 0 | Status: DISCONTINUED | OUTPATIENT
Start: 2017-10-03 | End: 2017-10-11

## 2017-10-03 RX ADMIN — SODIUM CHLORIDE 1000 MILLILITER(S): 9 INJECTION INTRAMUSCULAR; INTRAVENOUS; SUBCUTANEOUS at 22:33

## 2017-10-03 RX ADMIN — Medication 325 MILLIGRAM(S): at 23:05

## 2017-10-03 NOTE — ED ADULT NURSE NOTE - OBJECTIVE STATEMENT
AS PER DAUGHTER " MY MOTHER HAD HER BLOOD WORK DONE THIS MORNING AT MD OFFICE AND HER SODIUM WAS VERY LOW SO WE WERE TOLD TO BRING HER TO ED."

## 2017-10-03 NOTE — H&P ADULT - ASSESSMENT
Pt c Hx CAD recently PCI, Hx CHF, complications of T2DM - angiopathy, s/p TMA, CKD 3,   admitted with hyponatremia, leukopenia, SOB, >> BNP.  Cardilogy evaluation appreciated. ACE and diuretics on hold. Gentle NS IV  Nephrology and hematology consults Pt c Hx CAD recently PCI, Hx CHF, complications of T2DM - angiopathy, s/p TMA, CKD 3,   admitted with hyponatremia, leukopenia, SOB, Acute on chronic CHF, >> BNP.  Cardilogy evaluation appreciated. ACE and diuretics on hold. Gentle NS IV  Nephrology and hematology consults

## 2017-10-03 NOTE — ED PROVIDER NOTE - OBJECTIVE STATEMENT
Pertinent PMH/PSH/FHx/SHx and Review of Systems contained within:  Patient presents to the ED for   low sodium.  Patient had routine labs for upcoming PMD visit and was told to come to ER because sodium said to be very low, does not known level.  Patient reports mild neck stiffness and chronic shortness of breath with intermittent leg swelling, but otherwise feels at baseline.    ROS: No fever/chills, No headache/photophobia/eye pain/changes in vision, No ear pain/sore throat/dysphagia, No chest pain/palpitations, no SOB/cough/wheeze/stridor, No abdominal pain, No N/V/D/melena, no dysuria/frequency/discharge, No neck/back pain, no rash, no changes in neurological status/function.    Relevant PMHx/SHx/SOCHx/FAMH:  CAD s/p 2 stents in April 2017, PVD, HTN, DM, right foot amputation, right eye blindness,, cataract surgery, appendectomy, hysterectomy  Patient denies EtOH/tobacco/illicit substance use.  PMD: Dr. Walker

## 2017-10-03 NOTE — ED PROVIDER NOTE - MEDICAL DECISION MAKING DETAILS
Patient with asymptomatic hyponatremia.  VSS.  Trop elevated, no chest pain.  Need to replete Na carefully without causing further fluid overload.  UA/osmolality studies are pending.  Patient is to be admitted to the hospital and the case was discussed with the admitting physician.  Any changes in plan, additional imaging/labs, and further work up will be at the discretion of the admitting physician.

## 2017-10-04 DIAGNOSIS — D64.9 ANEMIA, UNSPECIFIED: ICD-10-CM

## 2017-10-04 DIAGNOSIS — E87.1 HYPO-OSMOLALITY AND HYPONATREMIA: ICD-10-CM

## 2017-10-04 LAB
ANION GAP SERPL CALC-SCNC: 12 MMOL/L — SIGNIFICANT CHANGE UP (ref 5–17)
ANION GAP SERPL CALC-SCNC: 12 MMOL/L — SIGNIFICANT CHANGE UP (ref 5–17)
BUN SERPL-MCNC: 47 MG/DL — HIGH (ref 7–23)
BUN SERPL-MCNC: 47 MG/DL — HIGH (ref 7–23)
CALCIUM SERPL-MCNC: 8.5 MG/DL — SIGNIFICANT CHANGE UP (ref 8.5–10.1)
CALCIUM SERPL-MCNC: 8.6 MG/DL — SIGNIFICANT CHANGE UP (ref 8.5–10.1)
CHLORIDE SERPL-SCNC: 87 MMOL/L — LOW (ref 96–108)
CHLORIDE SERPL-SCNC: 88 MMOL/L — LOW (ref 96–108)
CO2 SERPL-SCNC: 24 MMOL/L — SIGNIFICANT CHANGE UP (ref 22–31)
CO2 SERPL-SCNC: 25 MMOL/L — SIGNIFICANT CHANGE UP (ref 22–31)
CREAT SERPL-MCNC: 1.69 MG/DL — HIGH (ref 0.5–1.3)
CREAT SERPL-MCNC: 1.82 MG/DL — HIGH (ref 0.5–1.3)
GLUCOSE SERPL-MCNC: 111 MG/DL — HIGH (ref 70–99)
GLUCOSE SERPL-MCNC: 48 MG/DL — LOW (ref 70–99)
HBA1C BLD-MCNC: 7.5 % — HIGH (ref 4–5.6)
OSMOLALITY SERPL: 267 MOS/KG — LOW (ref 275–300)
OSMOLALITY UR: 212 MOS/KG — SIGNIFICANT CHANGE UP (ref 50–1200)
OSMOLALITY UR: 213 MOS/KG — SIGNIFICANT CHANGE UP (ref 50–1200)
POTASSIUM SERPL-MCNC: 3.7 MMOL/L — SIGNIFICANT CHANGE UP (ref 3.5–5.3)
POTASSIUM SERPL-MCNC: 4.1 MMOL/L — SIGNIFICANT CHANGE UP (ref 3.5–5.3)
POTASSIUM SERPL-SCNC: 3.7 MMOL/L — SIGNIFICANT CHANGE UP (ref 3.5–5.3)
POTASSIUM SERPL-SCNC: 4.1 MMOL/L — SIGNIFICANT CHANGE UP (ref 3.5–5.3)
SODIUM SERPL-SCNC: 124 MMOL/L — LOW (ref 135–145)
SODIUM SERPL-SCNC: 124 MMOL/L — LOW (ref 135–145)
TROPONIN I SERPL-MCNC: 1.87 NG/ML — HIGH (ref 0.01–0.04)

## 2017-10-04 RX ORDER — FUROSEMIDE 40 MG
40 TABLET ORAL ONCE
Qty: 0 | Refills: 0 | Status: COMPLETED | OUTPATIENT
Start: 2017-10-04 | End: 2017-10-04

## 2017-10-04 RX ORDER — HEPARIN SODIUM 5000 [USP'U]/ML
5000 INJECTION INTRAVENOUS; SUBCUTANEOUS EVERY 12 HOURS
Qty: 0 | Refills: 0 | Status: DISCONTINUED | OUTPATIENT
Start: 2017-10-04 | End: 2017-10-11

## 2017-10-04 RX ORDER — FILGRASTIM 480MCG/1.6
300 VIAL (ML) INJECTION ONCE
Qty: 0 | Refills: 0 | Status: COMPLETED | OUTPATIENT
Start: 2017-10-04 | End: 2017-10-04

## 2017-10-04 RX ORDER — DEXTROSE 50 % IN WATER 50 %
1 SYRINGE (ML) INTRAVENOUS ONCE
Qty: 0 | Refills: 0 | Status: COMPLETED | OUTPATIENT
Start: 2017-10-04 | End: 2017-10-04

## 2017-10-04 RX ADMIN — RANOLAZINE 500 MILLIGRAM(S): 500 TABLET, FILM COATED, EXTENDED RELEASE ORAL at 17:34

## 2017-10-04 RX ADMIN — SODIUM CHLORIDE 50 MILLILITER(S): 9 INJECTION INTRAMUSCULAR; INTRAVENOUS; SUBCUTANEOUS at 00:11

## 2017-10-04 RX ADMIN — Medication 300 MICROGRAM(S): at 11:13

## 2017-10-04 RX ADMIN — RANOLAZINE 500 MILLIGRAM(S): 500 TABLET, FILM COATED, EXTENDED RELEASE ORAL at 06:06

## 2017-10-04 RX ADMIN — Medication 1 DOSE(S): at 08:10

## 2017-10-04 RX ADMIN — Medication 40 MILLIGRAM(S): at 13:08

## 2017-10-04 RX ADMIN — HEPARIN SODIUM 5000 UNIT(S): 5000 INJECTION INTRAVENOUS; SUBCUTANEOUS at 17:34

## 2017-10-04 RX ADMIN — TICAGRELOR 90 MILLIGRAM(S): 90 TABLET ORAL at 17:34

## 2017-10-04 RX ADMIN — Medication 50 MILLIGRAM(S): at 21:29

## 2017-10-04 RX ADMIN — Medication 325 MILLIGRAM(S): at 17:35

## 2017-10-04 RX ADMIN — Medication 25 MILLIGRAM(S): at 11:04

## 2017-10-04 RX ADMIN — Medication 40 MILLIGRAM(S): at 06:05

## 2017-10-04 RX ADMIN — Medication 81 MILLIGRAM(S): at 11:13

## 2017-10-04 RX ADMIN — TICAGRELOR 90 MILLIGRAM(S): 90 TABLET ORAL at 06:05

## 2017-10-04 RX ADMIN — Medication 325 MILLIGRAM(S): at 06:05

## 2017-10-04 RX ADMIN — Medication 2: at 17:34

## 2017-10-04 RX ADMIN — Medication 50 MILLIGRAM(S): at 13:08

## 2017-10-04 RX ADMIN — LISINOPRIL 5 MILLIGRAM(S): 2.5 TABLET ORAL at 06:05

## 2017-10-04 NOTE — PHYSICAL THERAPY INITIAL EVALUATION ADULT - GENERAL OBSERVATIONS, REHAB EVAL
Patient encountered supine in bed, vital signs as charted. Attachments: cardiac monitor. AAOx4. Denies pain or shortness of breath at rest. Noted old right mid-foot amputation c mature, closed incision line (~20 yrs old).  Partially blind.

## 2017-10-04 NOTE — PHYSICAL THERAPY INITIAL EVALUATION ADULT - CRITERIA FOR SKILLED THERAPEUTIC INTERVENTIONS
functional limitations in following categories/rehab potential/anticipated equipment needs at discharge/impairments found/risk reduction/prevention/anticipated discharge recommendation

## 2017-10-04 NOTE — PHYSICAL THERAPY INITIAL EVALUATION ADULT - PERTINENT HX OF CURRENT PROBLEM, REHAB EVAL
Patient came in by PCP due to clinically low sodium. Chart reviewed and noted low WBC, low Na (uptrending), K normal, low serum glucose (40's; improved fingerstick to 120s after dextrose gel at 8 am), uptrending troponin, raised serum BNP, + TIMMY in urine; CXRay: negative pneumonic process. Appreciated cardio and heme onc hronic sysolic heart failure, no acute coronary syndrome, likely subendocardial structural disease.

## 2017-10-04 NOTE — CONSULT NOTE ADULT - SUBJECTIVE AND OBJECTIVE BOX
Information from chart:  "Patient is a 85y old  Female who presents with a chief complaint of weakness (03 Oct 2017 23:26)    HPI:  pt c CAD generalized weakness reported today Na 119. pt was referred to ER.  Leukopenia (03 Oct 2017 23:26)      Noted Metolazone 10 mg daily at home.  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    "    PAST MEDICAL & SURGICAL HISTORY:  CAD S/P percutaneous coronary angioplasty  PVD (peripheral vascular disease)  HTN (hypertension)  DM (diabetes mellitus screen)  S/P foot surgery: left foot surgery with sage s/p fx.  S/P cataract surgery  S/P appendectomy  S/P hysterectomy  Amputation foot, unilat    FAMILY HISTORY:  No pertinent family history in first degree relatives    Allergies    codeine (Other)    Intolerances      MEDICATIONS  (STANDING):  aspirin enteric coated 81 milliGRAM(s) Oral daily  dextrose 5%. 1000 milliLiter(s) (50 mL/Hr) IV Continuous <Continuous>  dextrose 50% Injectable 12.5 Gram(s) IV Push once  dextrose 50% Injectable 25 Gram(s) IV Push once  dextrose 50% Injectable 25 Gram(s) IV Push once  heparin  Injectable 5000 Unit(s) SubCutaneous every 12 hours  hydrALAZINE 50 milliGRAM(s) Oral every 8 hours  insulin glargine Injectable (LANTUS) 14 Unit(s) SubCutaneous every morning  insulin lispro (HumaLOG) corrective regimen sliding scale   SubCutaneous three times a day before meals  metoprolol succinate ER 25 milliGRAM(s) Oral daily  ranolazine 500 milliGRAM(s) Oral two times a day  sodium bicarbonate 325 milliGRAM(s) Oral two times a day  ticagrelor 90 milliGRAM(s) Oral two times a day    MEDICATIONS  (PRN):  dextrose Gel 1 Dose(s) Oral once PRN Blood Glucose LESS THAN 70 milliGRAM(s)/deciliter  glucagon  Injectable 1 milliGRAM(s) IntraMuscular once PRN Glucose LESS THAN 70 milligrams/deciliter    Vital Signs Last 24 Hrs  T(C): 36.9 (04 Oct 2017 11:10), Max: 37 (04 Oct 2017 01:34)  T(F): 98.4 (04 Oct 2017 11:10), Max: 98.6 (04 Oct 2017 01:34)  HR: 65 (04 Oct 2017 11:15) (48 - 65)  BP: 143/63 (04 Oct 2017 11:15) (119/51 - 152/97)  BP(mean): --  RR: 17 (04 Oct 2017 11:10) (12 - 21)  SpO2: 96% (04 Oct 2017 11:15) (96% - 98%)    Daily Height in cm: 149.86 (04 Oct 2017 01:34)    Daily   CAPILLARY BLOOD GLUCOSE  127 (04 Oct 2017 10:15)  54 (04 Oct 2017 07:56)  103 (04 Oct 2017 00:02)        PHYSICAL EXAM:      T(C): 36.9 (10-04-17 @ 11:10), Max: 37 (10-04-17 @ 01:34)  HR: 65 (10-04-17 @ 11:15) (48 - 65)  BP: 143/63 (10-04-17 @ 11:15) (119/51 - 152/97)  RR: 17 (10-04-17 @ 11:10) (12 - 21)  SpO2: 96% (10-04-17 @ 11:15) (96% - 98%)  Wt(kg): --  Respiratory: clear anteriorly, decreased BS at bases  Cardiovascular: S1 S2  Gastrointestinal: soft NT ND +BS  Extremities:   2 + edema              10-04    124<L>  |  87<L>  |  47<H>  ----------------------------<  111<H>  4.1   |  25  |  1.82<H>    Ca    8.6      04 Oct 2017 11:31    TPro  8.1  /  Alb  3.1<L>  /  TBili  0.3  /  DBili  x   /  AST  33  /  ALT  36  /  AlkPhos  76  10-03                          9.7    2.1   )-----------( 234      ( 03 Oct 2017 21:43 )             30.1     Urinalysis Basic - ( 03 Oct 2017 22:44 )    Color: Yellow / Appearance: Clear / S.010 / pH: x  Gluc: x / Ketone: Negative  / Bili: Negative / Urobili: Negative mg/dL   Blood: x / Protein: 30 mg/dL / Nitrite: Negative   Leuk Esterase: Trace / RBC: 0-2 /HPF / WBC 0-2   Sq Epi: x / Non Sq Epi: Few / Bacteria: Many            Assessment and Plan  Hypervolemic hyponatremia, chronic; CKD 3; CRS;  Suspected elevated ADH state; exacerbated with distal loop diuretic rx.  Avoid Distal loop diuretics; resume loop diuretic; FR;   Will follow.

## 2017-10-04 NOTE — PHYSICAL THERAPY INITIAL EVALUATION ADULT - MODIFIED CLINICAL TEST OF SENSORY INTEGRATION IN BALANCE TEST
Barthel Index: Feeding Score _10__, Bathing Score _0__, Grooming Score _5__, Dressing Score _5__, Bowels Score _10__, Bladder Score _10__, Toilet Score _10__, Transfers Score _10__, Mobility Score _0__, Stairs Score _0__,     Total Score __60_

## 2017-10-04 NOTE — CONSULT NOTE ADULT - SUBJECTIVE AND OBJECTIVE BOX
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 WITH UNDERLYING DIABETES.  PRESENTLY DENIES CHEST PAIN    EXAM  CLEAR LUNGS  NO JVD  SOFT HEART SOUNDS  NON TENDER ABDOMEN  EDEMA    ALL LABS/RADIOLOGY/MEDICATIONS REVIEWED; NA- 120'S, HYPOCHLOREMIA, RENAL INSUFFICIENCY, ELEVATED BNP AND TROPONIN; ECG: PENDING  TELEMETRY: NSR  WITH IVCD FIRST DEGREE AV BLOCK; VPC    IMPRESSION:    CHRONIC SYSTOLIC HEART FAILURE    HYPONATREMIA SECONDARY TO CHRONIC HEART FAILURE, RENAL INSUFFICIENCY AND COMPUNDED BY DIURETICS, AND ACE INHIBITOR;   CURRENTLY ON IV SALINE  CONSIDER TOLVAPTIN  WILL HOLD DIURETICS AND ACE INHIBITOR FOR SHORT TERM AND MONITOR TREND OF SODIUM, RENAL FUNCTION & ELECTROLYTES; DIURESE AS NEEDED.    TROPONIN ELEVATION PROBABLY FROM  SUBENDOCARDIAL ISCHEMIA AND  STRUCTURAL HEART DISEASE; NO ACUTE CORONARY SYNDROME SUSPECTED. WILL MONITOR TREND, CONTINUE BETA BLOCKER AND dual antiplatelet therapy FOR NOW.    TELEMETRY MONITORING    DAYO COTTON MD, FACC

## 2017-10-04 NOTE — PHYSICAL THERAPY INITIAL EVALUATION ADULT - ADDITIONAL COMMENTS
As per patient, lives alone in private house c 3 stair steps to enter, 8 steps to bedroom. Reports independently mobile but requires some help from home health aide c food prep. Independent in PADLs. Denies falls in past 6 months.

## 2017-10-04 NOTE — PHYSICAL THERAPY INITIAL EVALUATION ADULT - IMPAIRMENTS FOUND, PT EVAL
gait, locomotion, and balance/neuromotor development and sensory integration/integumentary integrity/joint integrity and mobility/visual motor/ROM/aerobic capacity/endurance/muscle strength

## 2017-10-04 NOTE — PHYSICAL THERAPY INITIAL EVALUATION ADULT - PLANNED THERAPY INTERVENTIONS, PT EVAL
stretching/gait training/strengthening/transfer training/balance training/neuromuscular re-education/ROM

## 2017-10-04 NOTE — CONSULT NOTE ADULT - SUBJECTIVE AND OBJECTIVE BOX
REASON FOR CONSULTATION:  Neutropenia.    INTERVAL HISTORY:      Allergies    codeine (Other)    Intolerances        MEDICATIONS  (STANDING):  aspirin enteric coated 81 milliGRAM(s) Oral daily  dextrose 5%. 1000 milliLiter(s) (50 mL/Hr) IV Continuous <Continuous>  dextrose 50% Injectable 12.5 Gram(s) IV Push once  dextrose 50% Injectable 25 Gram(s) IV Push once  dextrose 50% Injectable 25 Gram(s) IV Push once  filgrastim-sndz Injectable 300 MICROGram(s) SubCutaneous once  heparin  Injectable 5000 Unit(s) SubCutaneous every 12 hours  hydrALAZINE 50 milliGRAM(s) Oral every 8 hours  insulin glargine Injectable (LANTUS) 14 Unit(s) SubCutaneous every morning  insulin lispro (HumaLOG) corrective regimen sliding scale   SubCutaneous three times a day before meals  metoprolol succinate ER 25 milliGRAM(s) Oral daily  ranolazine 500 milliGRAM(s) Oral two times a day  sodium bicarbonate 325 milliGRAM(s) Oral two times a day  sodium chloride 0.9%. 1000 milliLiter(s) (50 mL/Hr) IV Continuous <Continuous>  ticagrelor 90 milliGRAM(s) Oral two times a day    MEDICATIONS  (PRN):  dextrose Gel 1 Dose(s) Oral once PRN Blood Glucose LESS THAN 70 milliGRAM(s)/deciliter  glucagon  Injectable 1 milliGRAM(s) IntraMuscular once PRN Glucose LESS THAN 70 milligrams/deciliter      Vital Signs Last 24 Hrs  T(C): 36.1 (04 Oct 2017 05:16), Max: 37 (04 Oct 2017 01:34)  T(F): 97 (04 Oct 2017 05:16), Max: 98.6 (04 Oct 2017 01:34)  HR: 48 (04 Oct 2017 05:16) (48 - 61)  BP: 119/51 (04 Oct 2017 05:16) (119/51 - 135/57)  BP(mean): --  RR: 17 (04 Oct 2017 05:16) (12 - 21)  SpO2: 97% (04 Oct 2017 05:16) (96% - 98%)    PHYSICAL EXAM:    EYES: Right Eye;- Blind.  CHEST/LUNG: Clear to percussion bilaterally;   HEART: Regular rate and rhythm;   ABDOMEN: Soft, Nontender, Nondistended;   Edema:- ++      LABS:                        9.7    2.1   )-----------( 234      ( 03 Oct 2017 21:43 )             30.1     10-04    124<L>  |  88<L>  |  47<H>  ----------------------------<  48<L>  3.7   |  24  |  1.69<H>    Ca    8.5      04 Oct 2017 06:33    TPro  8.1  /  Alb  3.1<L>  /  TBili  0.3  /  DBili  x   /  AST  33  /  ALT  36  /  AlkPhos  76  10-03      Urinalysis Basic - ( 03 Oct 2017 22:44 )    Color: Yellow / Appearance: Clear / S.010 / pH: x  Gluc: x / Ketone: Negative  / Bili: Negative / Urobili: Negative mg/dL   Blood: x / Protein: 30 mg/dL / Nitrite: Negative   Leuk Esterase: Trace / RBC: 0-2 /HPF / WBC 0-2   Sq Epi: x / Non Sq Epi: Few / Bacteria: Many          RADIOLOGY & ADDITIONAL STUDIES:    PATHOLOGY:

## 2017-10-05 LAB
ANION GAP SERPL CALC-SCNC: 14 MMOL/L — SIGNIFICANT CHANGE UP (ref 5–17)
BUN SERPL-MCNC: 47 MG/DL — HIGH (ref 7–23)
CALCIUM SERPL-MCNC: 8.7 MG/DL — SIGNIFICANT CHANGE UP (ref 8.5–10.1)
CHLORIDE SERPL-SCNC: 89 MMOL/L — LOW (ref 96–108)
CO2 SERPL-SCNC: 23 MMOL/L — SIGNIFICANT CHANGE UP (ref 22–31)
CORTIS AM PEAK SERPL-MCNC: 12.7 UG/DL — SIGNIFICANT CHANGE UP (ref 6–18.4)
CREAT SERPL-MCNC: 2.11 MG/DL — HIGH (ref 0.5–1.3)
CULTURE RESULTS: SIGNIFICANT CHANGE UP
GLUCOSE SERPL-MCNC: 107 MG/DL — HIGH (ref 70–99)
HCT VFR BLD CALC: 30.1 % — LOW (ref 34.5–45)
HGB BLD-MCNC: 9.6 G/DL — LOW (ref 11.5–15.5)
IRON SATN MFR SERPL: 18 % — SIGNIFICANT CHANGE UP (ref 14–50)
IRON SATN MFR SERPL: 55 UG/DL — SIGNIFICANT CHANGE UP (ref 30–160)
MCHC RBC-ENTMCNC: 28.3 PG — SIGNIFICANT CHANGE UP (ref 27–34)
MCHC RBC-ENTMCNC: 31.8 GM/DL — LOW (ref 32–36)
MCV RBC AUTO: 88.8 FL — SIGNIFICANT CHANGE UP (ref 80–100)
OSMOLALITY UR: 232 MOS/KG — SIGNIFICANT CHANGE UP (ref 50–1200)
PLATELET # BLD AUTO: 217 K/UL — SIGNIFICANT CHANGE UP (ref 150–400)
POTASSIUM SERPL-MCNC: 4.2 MMOL/L — SIGNIFICANT CHANGE UP (ref 3.5–5.3)
POTASSIUM SERPL-SCNC: 4.2 MMOL/L — SIGNIFICANT CHANGE UP (ref 3.5–5.3)
RBC # BLD: 3.39 M/UL — LOW (ref 3.8–5.2)
RBC # FLD: 13.7 % — SIGNIFICANT CHANGE UP (ref 11–15)
SODIUM SERPL-SCNC: 126 MMOL/L — LOW (ref 135–145)
SPECIMEN SOURCE: SIGNIFICANT CHANGE UP
T3 SERPL-MCNC: 81 NG/DL — SIGNIFICANT CHANGE UP (ref 80–200)
T4 AB SER-ACNC: 7.6 UG/DL — SIGNIFICANT CHANGE UP (ref 4.6–12)
TIBC SERPL-MCNC: 307 UG/DL — SIGNIFICANT CHANGE UP (ref 220–430)
TROPONIN I SERPL-MCNC: 1.79 NG/ML — HIGH (ref 0.01–0.04)
TSH SERPL-MCNC: 1.81 UU/ML — SIGNIFICANT CHANGE UP (ref 0.36–3.74)
UIBC SERPL-MCNC: 252 UG/DL — SIGNIFICANT CHANGE UP (ref 110–370)
URATE SERPL-MCNC: 12 MG/DL — HIGH (ref 2.5–7)
WBC # BLD: 9.5 K/UL — SIGNIFICANT CHANGE UP (ref 3.8–10.5)
WBC # FLD AUTO: 9.5 K/UL — SIGNIFICANT CHANGE UP (ref 3.8–10.5)

## 2017-10-05 PROCEDURE — 93010 ELECTROCARDIOGRAM REPORT: CPT

## 2017-10-05 RX ORDER — FUROSEMIDE 40 MG
80 TABLET ORAL DAILY
Qty: 0 | Refills: 0 | Status: DISCONTINUED | OUTPATIENT
Start: 2017-10-05 | End: 2017-10-06

## 2017-10-05 RX ADMIN — RANOLAZINE 500 MILLIGRAM(S): 500 TABLET, FILM COATED, EXTENDED RELEASE ORAL at 17:26

## 2017-10-05 RX ADMIN — Medication 50 MILLIGRAM(S): at 14:57

## 2017-10-05 RX ADMIN — TICAGRELOR 90 MILLIGRAM(S): 90 TABLET ORAL at 17:26

## 2017-10-05 RX ADMIN — Medication 81 MILLIGRAM(S): at 12:06

## 2017-10-05 RX ADMIN — Medication 25 MILLIGRAM(S): at 06:24

## 2017-10-05 RX ADMIN — Medication 50 MILLIGRAM(S): at 22:25

## 2017-10-05 RX ADMIN — Medication 325 MILLIGRAM(S): at 06:24

## 2017-10-05 RX ADMIN — Medication 80 MILLIGRAM(S): at 14:56

## 2017-10-05 RX ADMIN — HEPARIN SODIUM 5000 UNIT(S): 5000 INJECTION INTRAVENOUS; SUBCUTANEOUS at 17:26

## 2017-10-05 RX ADMIN — Medication 2: at 12:04

## 2017-10-05 RX ADMIN — HEPARIN SODIUM 5000 UNIT(S): 5000 INJECTION INTRAVENOUS; SUBCUTANEOUS at 06:23

## 2017-10-05 RX ADMIN — INSULIN GLARGINE 14 UNIT(S): 100 INJECTION, SOLUTION SUBCUTANEOUS at 09:54

## 2017-10-05 RX ADMIN — RANOLAZINE 500 MILLIGRAM(S): 500 TABLET, FILM COATED, EXTENDED RELEASE ORAL at 06:24

## 2017-10-05 RX ADMIN — Medication 325 MILLIGRAM(S): at 17:26

## 2017-10-05 RX ADMIN — TICAGRELOR 90 MILLIGRAM(S): 90 TABLET ORAL at 06:23

## 2017-10-05 RX ADMIN — Medication 50 MILLIGRAM(S): at 06:23

## 2017-10-05 NOTE — PROGRESS NOTE ADULT - SUBJECTIVE AND OBJECTIVE BOX
Patient is a 85y old  Female who presents with a chief complaint of weakness (03 Oct 2017 23:26)      INTERVAL HPI/OVERNIGHT EVENTS:    MEDICATIONS  (STANDING):  aspirin enteric coated 81 milliGRAM(s) Oral daily  dextrose 5%. 1000 milliLiter(s) (50 mL/Hr) IV Continuous <Continuous>  dextrose 50% Injectable 12.5 Gram(s) IV Push once  dextrose 50% Injectable 25 Gram(s) IV Push once  dextrose 50% Injectable 25 Gram(s) IV Push once  heparin  Injectable 5000 Unit(s) SubCutaneous every 12 hours  hydrALAZINE 50 milliGRAM(s) Oral every 8 hours  insulin glargine Injectable (LANTUS) 14 Unit(s) SubCutaneous every morning  insulin lispro (HumaLOG) corrective regimen sliding scale   SubCutaneous three times a day before meals  metoprolol succinate ER 25 milliGRAM(s) Oral daily  ranolazine 500 milliGRAM(s) Oral two times a day  sodium bicarbonate 325 milliGRAM(s) Oral two times a day  ticagrelor 90 milliGRAM(s) Oral two times a day    MEDICATIONS  (PRN):  dextrose Gel 1 Dose(s) Oral once PRN Blood Glucose LESS THAN 70 milliGRAM(s)/deciliter  glucagon  Injectable 1 milliGRAM(s) IntraMuscular once PRN Glucose LESS THAN 70 milligrams/deciliter      Allergies    codeine (Other)    Intolerances        REVIEW OF SYSTEMS:        HEENT - wnl  RESPIRATORY: No cough, wheezing, chills or hemoptysis; No shortness of breath  CARDIOVASCULAR: No chest pain, palpitations, dizziness, or leg swelling  GASTROINTESTINAL: No abdominal or epigastric pain. No nausea, vomiting, or hematemesis; No diarrhea or constipation. No melena or hematochezia.  GENITOURINARY: No dysuria, frequency, hematuria, or incontinence  SKIN: No itching, burning, rashes, or lesions   MUSCULOSKELETAL: No joint pain or swelling; No muscle, back, or extremity pain  PSYCHIATRIC: No depression, anxiety, mood swings, or difficulty sleeping        Vital Signs Last 24 Hrs  T(C): 36.9 (05 Oct 2017 05:42), Max: 37 (05 Oct 2017 00:01)  T(F): 98.4 (05 Oct 2017 05:42), Max: 98.6 (05 Oct 2017 00:01)  HR: 60 (05 Oct 2017 05:42) (60 - 66)  BP: 127/39 (05 Oct 2017 05:42) (121/39 - 158/72)  BP(mean): --  RR: 18 (05 Oct 2017 05:42) (17 - 18)  SpO2: 94% (05 Oct 2017 05:42) (94% - 97%)    PHYSICAL EXAM:  general       HEENT wnl  CHEST/LUNG: Clear to percussion bilaterally; No rales, rhonchi, wheezing, or rubs  HEART: Regular rate and rhythm; No murmurs, rubs, or gallops  ABDOMEN: Soft, Nontender, Nondistended; Bowel sounds present  EXTREMITIES:  2+ Peripheral Pulses, No clubbing, cyanosis, or edema  LYMPH: No lymphadenopathy noted  SKIN: No rashes or lesions    LABS:                        9.6    9.5   )-----------( 217      ( 05 Oct 2017 08:29 )             30.1     10-05    126<L>  |  89<L>  |  47<H>  ----------------------------<  107<H>  4.2   |  23  |  2.11<H>    Ca    8.7      05 Oct 2017 08:29    TPro  8.1  /  Alb  3.1<L>  /  TBili  0.3  /  DBili  x   /  AST  33  /  ALT  36  /  AlkPhos  76  10-03      Urinalysis Basic - ( 03 Oct 2017 22:44 )    Color: Yellow / Appearance: Clear / S.010 / pH: x  Gluc: x / Ketone: Negative  / Bili: Negative / Urobili: Negative mg/dL   Blood: x / Protein: 30 mg/dL / Nitrite: Negative   Leuk Esterase: Trace / RBC: 0-2 /HPF / WBC 0-2   Sq Epi: x / Non Sq Epi: Few / Bacteria: Many      CAPILLARY BLOOD GLUCOSE  124 (05 Oct 2017 07:37)  197 (04 Oct 2017 21:23)  163 (04 Oct 2017 16:57)          CARDIAC MARKERS ( 05 Oct 2017 08:29 )  1.790 ng/mL / x     / x     / x     / x      CARDIAC MARKERS ( 04 Oct 2017 11:31 )  1.870 ng/mL / x     / x     / x     / x      CARDIAC MARKERS ( 03 Oct 2017 21:43 )  1.860 ng/mL / x     / 81 U/L / x     / 1.7 ng/mL      Hemoglobin A1C, Whole Blood: 7.5 % (10-04 @ 09:00)        RADIOLOGY & ADDITIONAL TESTS:    Imaging Personally Reviewed:  [y ] YES  [ ] NO    Consultant(s) Notes Reviewed:  [y ] YES  [ ] NO    Care Discussed with Consultants/Other Providers [ ] YES  [ ] NO    PROBLEMS:  HYPONATREMIA  LOW SODIUM LEVELS , SENT BY PMD  Hyponatremia ? hypervolemic    Anemia      Care discussed with family,         [  v]   yes  [  ]  No    imp:    stable[ ]    unstable[  ]     improving [ v ]       unchanged  [  ]                Plans:  Continue present plans  [v  ]  ? restart diuretic. ? restart ACE inhibitor?  Will discuss c nephrology  Wt daily

## 2017-10-05 NOTE — PROGRESS NOTE ADULT - SUBJECTIVE AND OBJECTIVE BOX
INTERVAL History:  Anemia.  Weakness.  Dyspnea.    Allergies    codeine (Other)    Intolerances        MEDICATIONS  (STANDING):  aspirin enteric coated 81 milliGRAM(s) Oral daily  dextrose 5%. 1000 milliLiter(s) (50 mL/Hr) IV Continuous <Continuous>  dextrose 50% Injectable 12.5 Gram(s) IV Push once  dextrose 50% Injectable 25 Gram(s) IV Push once  dextrose 50% Injectable 25 Gram(s) IV Push once  heparin  Injectable 5000 Unit(s) SubCutaneous every 12 hours  hydrALAZINE 50 milliGRAM(s) Oral every 8 hours  insulin glargine Injectable (LANTUS) 14 Unit(s) SubCutaneous every morning  insulin lispro (HumaLOG) corrective regimen sliding scale   SubCutaneous three times a day before meals  metoprolol succinate ER 25 milliGRAM(s) Oral daily  ranolazine 500 milliGRAM(s) Oral two times a day  sodium bicarbonate 325 milliGRAM(s) Oral two times a day  ticagrelor 90 milliGRAM(s) Oral two times a day    MEDICATIONS  (PRN):  dextrose Gel 1 Dose(s) Oral once PRN Blood Glucose LESS THAN 70 milliGRAM(s)/deciliter  glucagon  Injectable 1 milliGRAM(s) IntraMuscular once PRN Glucose LESS THAN 70 milligrams/deciliter      Vital Signs Last 24 Hrs  T(C): 36.9 (05 Oct 2017 05:42), Max: 37 (05 Oct 2017 00:01)  T(F): 98.4 (05 Oct 2017 05:42), Max: 98.6 (05 Oct 2017 00:01)  HR: 60 (05 Oct 2017 05:42) (60 - 66)  BP: 127/39 (05 Oct 2017 05:42) (121/39 - 158/72)  BP(mean): --  RR: 18 (05 Oct 2017 05:42) (17 - 18)  SpO2: 94% (05 Oct 2017 05:42) (94% - 97%)    PHYSICAL EXAM:      EYES: Right Eye:- Blind.  NECK: Supple, No JVD, Normal thyroid  CHEST/LUNG: Clear to percussion bilaterally; No rales, rhonchi,   HEART: Regular rate and rhythm;   ABDOMEN: Soft, Nontender.  Edema +        LABS:                        9.6    9.5   )-----------( 217      ( 05 Oct 2017 08:29 )             30.1     10-05    126<L>  |  89<L>  |  47<H>  ----------------------------<  107<H>  4.2   |  23  |  2.11<H>    Ca    8.7      05 Oct 2017 08:29    TPro  8.1  /  Alb  3.1<L>  /  TBili  0.3  /  DBili  x   /  AST  33  /  ALT  36  /  AlkPhos  76  10-03      Urinalysis Basic - ( 03 Oct 2017 22:44 )    Color: Yellow / Appearance: Clear / S.010 / pH: x  Gluc: x / Ketone: Negative  / Bili: Negative / Urobili: Negative mg/dL   Blood: x / Protein: 30 mg/dL / Nitrite: Negative   Leuk Esterase: Trace / RBC: 0-2 /HPF / WBC 0-2   Sq Epi: x / Non Sq Epi: Few / Bacteria: Many          RADIOLOGY & ADDITIONAL STUDIES:    PATHOLOGY:

## 2017-10-05 NOTE — PROGRESS NOTE ADULT - SUBJECTIVE AND OBJECTIVE BOX
Subjective: SOB has decreased      MEDICATIONS  (STANDING):  aspirin enteric coated 81 milliGRAM(s) Oral daily  dextrose 5%. 1000 milliLiter(s) (50 mL/Hr) IV Continuous <Continuous>  dextrose 50% Injectable 12.5 Gram(s) IV Push once  dextrose 50% Injectable 25 Gram(s) IV Push once  dextrose 50% Injectable 25 Gram(s) IV Push once  heparin  Injectable 5000 Unit(s) SubCutaneous every 12 hours  hydrALAZINE 50 milliGRAM(s) Oral every 8 hours  insulin glargine Injectable (LANTUS) 14 Unit(s) SubCutaneous every morning  insulin lispro (HumaLOG) corrective regimen sliding scale   SubCutaneous three times a day before meals  metoprolol succinate ER 25 milliGRAM(s) Oral daily  ranolazine 500 milliGRAM(s) Oral two times a day  sodium bicarbonate 325 milliGRAM(s) Oral two times a day  ticagrelor 90 milliGRAM(s) Oral two times a day    MEDICATIONS  (PRN):  dextrose Gel 1 Dose(s) Oral once PRN Blood Glucose LESS THAN 70 milliGRAM(s)/deciliter  glucagon  Injectable 1 milliGRAM(s) IntraMuscular once PRN Glucose LESS THAN 70 milligrams/deciliter          T(C): 36.9 (10-05-17 @ 05:42), Max: 37 (10-05-17 @ 00:01)  HR: 60 (10-05-17 @ 05:42) (60 - 66)  BP: 127/39 (10-05-17 @ 05:42) (121/39 - 158/72)  RR: 18 (10-05-17 @ 05:42) (17 - 18)  SpO2: 94% (10-05-17 @ 05:42) (94% - 97%)  Wt(kg): --        I&O's Detail    04 Oct 2017 07:  -  05 Oct 2017 07:00  --------------------------------------------------------  IN:    Oral Fluid: 720 mL  Total IN: 720 mL    OUT:  Total OUT: 0 mL    Total NET: 720 mL      05 Oct 2017 07:  -  05 Oct 2017 09:13  --------------------------------------------------------  IN:    Oral Fluid: 360 mL  Total IN: 360 mL    OUT:  Total OUT: 0 mL    Total NET: 360 mL               PHYSICAL EXAM:    GENERAL: comfortable  EYES: EOMI, PERRLA, conjunctiva and sclera clear  NECK: Supple, no inc in JVP  CHEST/LUNG: basilar crackles  HEART: S1S2  ABDOMEN: Soft, Nontender, Nondistended; Bowel sounds present  EXTREMITIES:  pos edema on L. L TMA.   NEURO: no asterixis      LABS:  CBC Full  -  ( 05 Oct 2017 08:29 )  WBC Count : 9.5 K/uL  Hemoglobin : 9.6 g/dL  Hematocrit : 30.1 %  Platelet Count - Automated : 217 K/uL  Mean Cell Volume : 88.8 fl  Mean Cell Hemoglobin : 28.3 pg  Mean Cell Hemoglobin Concentration : 31.8 gm/dL  Auto Neutrophil # : x  Auto Lymphocyte # : x  Auto Monocyte # : x  Auto Eosinophil # : x  Auto Basophil # : x  Auto Neutrophil % : x  Auto Lymphocyte % : x  Auto Monocyte % : x  Auto Eosinophil % : x  Auto Basophil % : x    10-04    124<L>  |  87<L>  |  47<H>  ----------------------------<  111<H>  4.1   |  25  |  1.82<H>    Ca    8.6      04 Oct 2017 11:31    TPro  8.1  /  Alb  3.1<L>  /  TBili  0.3  /  DBili  x   /  AST  33  /  ALT  36  /  AlkPhos  76  10-03      Urinalysis Basic - ( 03 Oct 2017 22:44 )    Color: Yellow / Appearance: Clear / S.010 / pH: x  Gluc: x / Ketone: Negative  / Bili: Negative / Urobili: Negative mg/dL   Blood: x / Protein: 30 mg/dL / Nitrite: Negative   Leuk Esterase: Trace / RBC: 0-2 /HPF / WBC 0-2   Sq Epi: x / Non Sq Epi: Few / Bacteria: Many      Impression:  * CKD3. Stable  * HypoNa -- presumed hypervolemic    Recommendations:  * Cont IV/PO loop diuretic  * Oral FR 1000cc/day  * Repeat BMP in am

## 2017-10-05 NOTE — PROGRESS NOTE ADULT - SUBJECTIVE AND OBJECTIVE BOX
NEPHROLOGY NOTED  PATIENT COMFORTABLE; DENIES DYSPNEA  TELEMETRY: NSR ; IVCD  NO JVD  CLEAR LUNGS  NORMAL HEART SOUNDS  EDEMA    WILL RESUME LASIX 80 QD IVP AND OBSERVE; MONITORING NA, RENAL FUNCTION    DAYO COTTON MD, FACC

## 2017-10-06 LAB
ANION GAP SERPL CALC-SCNC: 12 MMOL/L — SIGNIFICANT CHANGE UP (ref 5–17)
BUN SERPL-MCNC: 57 MG/DL — HIGH (ref 7–23)
CALCIUM SERPL-MCNC: 8.8 MG/DL — SIGNIFICANT CHANGE UP (ref 8.5–10.1)
CHLORIDE SERPL-SCNC: 90 MMOL/L — LOW (ref 96–108)
CO2 SERPL-SCNC: 25 MMOL/L — SIGNIFICANT CHANGE UP (ref 22–31)
CREAT SERPL-MCNC: 2.41 MG/DL — HIGH (ref 0.5–1.3)
GLUCOSE SERPL-MCNC: 78 MG/DL — SIGNIFICANT CHANGE UP (ref 70–99)
POTASSIUM SERPL-MCNC: 4.6 MMOL/L — SIGNIFICANT CHANGE UP (ref 3.5–5.3)
POTASSIUM SERPL-SCNC: 4.6 MMOL/L — SIGNIFICANT CHANGE UP (ref 3.5–5.3)
SODIUM SERPL-SCNC: 127 MMOL/L — LOW (ref 135–145)
TROPONIN I SERPL-MCNC: 1.65 NG/ML — HIGH (ref 0.01–0.04)

## 2017-10-06 RX ORDER — ALBUTEROL 90 UG/1
2.5 AEROSOL, METERED ORAL EVERY 8 HOURS
Qty: 0 | Refills: 0 | Status: DISCONTINUED | OUTPATIENT
Start: 2017-10-06 | End: 2017-10-11

## 2017-10-06 RX ORDER — ALLOPURINOL 300 MG
100 TABLET ORAL DAILY
Qty: 0 | Refills: 0 | Status: DISCONTINUED | OUTPATIENT
Start: 2017-10-06 | End: 2017-10-11

## 2017-10-06 RX ORDER — BUMETANIDE 0.25 MG/ML
2 INJECTION INTRAMUSCULAR; INTRAVENOUS DAILY
Qty: 0 | Refills: 0 | Status: DISCONTINUED | OUTPATIENT
Start: 2017-10-07 | End: 2017-10-11

## 2017-10-06 RX ADMIN — Medication 25 MILLIGRAM(S): at 05:52

## 2017-10-06 RX ADMIN — TICAGRELOR 90 MILLIGRAM(S): 90 TABLET ORAL at 17:58

## 2017-10-06 RX ADMIN — Medication 50 MILLIGRAM(S): at 05:52

## 2017-10-06 RX ADMIN — Medication 50 MILLIGRAM(S): at 21:49

## 2017-10-06 RX ADMIN — RANOLAZINE 500 MILLIGRAM(S): 500 TABLET, FILM COATED, EXTENDED RELEASE ORAL at 17:58

## 2017-10-06 RX ADMIN — Medication 50 MILLIGRAM(S): at 13:50

## 2017-10-06 RX ADMIN — TICAGRELOR 90 MILLIGRAM(S): 90 TABLET ORAL at 05:52

## 2017-10-06 RX ADMIN — HEPARIN SODIUM 5000 UNIT(S): 5000 INJECTION INTRAVENOUS; SUBCUTANEOUS at 17:58

## 2017-10-06 RX ADMIN — Medication 4: at 11:53

## 2017-10-06 RX ADMIN — Medication 325 MILLIGRAM(S): at 05:52

## 2017-10-06 RX ADMIN — Medication 325 MILLIGRAM(S): at 17:58

## 2017-10-06 RX ADMIN — Medication 100 MILLIGRAM(S): at 11:55

## 2017-10-06 RX ADMIN — Medication 81 MILLIGRAM(S): at 11:54

## 2017-10-06 RX ADMIN — Medication 80 MILLIGRAM(S): at 05:51

## 2017-10-06 RX ADMIN — RANOLAZINE 500 MILLIGRAM(S): 500 TABLET, FILM COATED, EXTENDED RELEASE ORAL at 05:52

## 2017-10-06 RX ADMIN — INSULIN GLARGINE 14 UNIT(S): 100 INJECTION, SOLUTION SUBCUTANEOUS at 11:55

## 2017-10-06 NOTE — PROGRESS NOTE ADULT - SUBJECTIVE AND OBJECTIVE BOX
Patient seen in follow up for MAXIMINO, CKD 3; hyponatremia; SOB last night; feeling better today.    MEDICATIONS  (STANDING):  allopurinol 100 milliGRAM(s) Oral daily  aspirin enteric coated 81 milliGRAM(s) Oral daily  dextrose 5%. 1000 milliLiter(s) (50 mL/Hr) IV Continuous <Continuous>  dextrose 50% Injectable 12.5 Gram(s) IV Push once  dextrose 50% Injectable 25 Gram(s) IV Push once  dextrose 50% Injectable 25 Gram(s) IV Push once  furosemide   Injectable 80 milliGRAM(s) IV Push daily  heparin  Injectable 5000 Unit(s) SubCutaneous every 12 hours  hydrALAZINE 50 milliGRAM(s) Oral every 8 hours  insulin glargine Injectable (LANTUS) 14 Unit(s) SubCutaneous every morning  insulin lispro (HumaLOG) corrective regimen sliding scale   SubCutaneous three times a day before meals  metoprolol succinate ER 25 milliGRAM(s) Oral daily  ranolazine 500 milliGRAM(s) Oral two times a day  sodium bicarbonate 325 milliGRAM(s) Oral two times a day  ticagrelor 90 milliGRAM(s) Oral two times a day    MEDICATIONS  (PRN):  dextrose Gel 1 Dose(s) Oral once PRN Blood Glucose LESS THAN 70 milliGRAM(s)/deciliter  glucagon  Injectable 1 milliGRAM(s) IntraMuscular once PRN Glucose LESS THAN 70 milligrams/deciliter    PHYSICAL EXAM:      T(C): 36.3 (10-06-17 @ 11:13), Max: 36.6 (10-05-17 @ 17:29)  HR: 60 (10-06-17 @ 11:13) (50 - 65)  BP: 127/48 (10-06-17 @ 11:13) (113/56 - 127/48)  RR: 18 (10-06-17 @ 11:13) (18 - 20)  SpO2: 97% (10-06-17 @ 11:13) (97% - 98%)  Wt(kg): --  Respiratory: clear anteriorly, decreased BS at bases no rales   Cardiovascular: S1 S2  Gastrointestinal: soft NT ND +BS  Extremities:   1-2 edema                                    9.6    9.5   )-----------( 217      ( 05 Oct 2017 08:29 )             30.1     10-06    127<L>  |  90<L>  |  57<H>  ----------------------------<  78  4.6   |  25  |  2.41<H>    Ca    8.8      06 Oct 2017 06:52    < from: TTE Echo Doppler w/o Cont (03.16.17 @ 14:08) >   Summary:   1.Left ventricular ejection fraction, by visual estimation, is 55 to   60%.   2. The left atrium is normal in size.   3. Normal left ventricular internal cavity size.   4. Spectral Doppler shows impaired relaxation pattern of left   ventricular myocardial filling (Grade I diastolic dysfunction).   5. Normal global left ventricular systolic function.   6. Thickening and calcification of the anterior and posterior mitral   valve leaflets.   7. Mild mitral valve regurgitation.   8. The right atrium isnormal in size.   9. Normal right ventricular size and function.  10. There is no evidence of pericardial effusion.  11. There is mild aortic root calcification.  12. Sclerotic aortic valve with decreased opening.  13. Mild mitral annular calcification.  14. Technically difficult study.  15. Degenerative tricuspid valve.              Assessment and Plan:  MAXIMINO, hypervolemic hyponatremia; elevated ADH state;   IV loop diuretic to po as per cardio; encourage po solute intake.  Question underlying lung disease present. Patient seen in follow up for MAXIMINO, CKD 3; hyponatremia; SOB last night; feeling better today.    MEDICATIONS  (STANDING):  allopurinol 100 milliGRAM(s) Oral daily  aspirin enteric coated 81 milliGRAM(s) Oral daily  dextrose 5%. 1000 milliLiter(s) (50 mL/Hr) IV Continuous <Continuous>  dextrose 50% Injectable 12.5 Gram(s) IV Push once  dextrose 50% Injectable 25 Gram(s) IV Push once  dextrose 50% Injectable 25 Gram(s) IV Push once  furosemide   Injectable 80 milliGRAM(s) IV Push daily  heparin  Injectable 5000 Unit(s) SubCutaneous every 12 hours  hydrALAZINE 50 milliGRAM(s) Oral every 8 hours  insulin glargine Injectable (LANTUS) 14 Unit(s) SubCutaneous every morning  insulin lispro (HumaLOG) corrective regimen sliding scale   SubCutaneous three times a day before meals  metoprolol succinate ER 25 milliGRAM(s) Oral daily  ranolazine 500 milliGRAM(s) Oral two times a day  sodium bicarbonate 325 milliGRAM(s) Oral two times a day  ticagrelor 90 milliGRAM(s) Oral two times a day    MEDICATIONS  (PRN):  dextrose Gel 1 Dose(s) Oral once PRN Blood Glucose LESS THAN 70 milliGRAM(s)/deciliter  glucagon  Injectable 1 milliGRAM(s) IntraMuscular once PRN Glucose LESS THAN 70 milligrams/deciliter    PHYSICAL EXAM:      T(C): 36.3 (10-06-17 @ 11:13), Max: 36.6 (10-05-17 @ 17:29)  HR: 60 (10-06-17 @ 11:13) (50 - 65)  BP: 127/48 (10-06-17 @ 11:13) (113/56 - 127/48)  RR: 18 (10-06-17 @ 11:13) (18 - 20)  SpO2: 97% (10-06-17 @ 11:13) (97% - 98%)  Wt(kg): --  Respiratory: clear anteriorly, decreased BS at bases no rales   Cardiovascular: S1 S2  Gastrointestinal: soft NT ND +BS  Extremities:   1-2 edema                                    9.6    9.5   )-----------( 217      ( 05 Oct 2017 08:29 )             30.1     10-06    127<L>  |  90<L>  |  57<H>  ----------------------------<  78  4.6   |  25  |  2.41<H>    Ca    8.8      06 Oct 2017 06:52    < from: TTE Echo Doppler w/o Cont (03.16.17 @ 14:08) >   Summary:   1.Left ventricular ejection fraction, by visual estimation, is 55 to   60%.   2. The left atrium is normal in size.   3. Normal left ventricular internal cavity size.   4. Spectral Doppler shows impaired relaxation pattern of left   ventricular myocardial filling (Grade I diastolic dysfunction).   5. Normal global left ventricular systolic function.   6. Thickening and calcification of the anterior and posterior mitral   valve leaflets.   7. Mild mitral valve regurgitation.   8. The right atrium isnormal in size.   9. Normal right ventricular size and function.  10. There is no evidence of pericardial effusion.  11. There is mild aortic root calcification.  12. Sclerotic aortic valve with decreased opening.  13. Mild mitral annular calcification.  14. Technically difficult study.  15. Degenerative tricuspid valve.              Assessment and Plan:  MAXIMINO, hypervolemic hyponatremia; elevated ADH state;   IV loop diuretic to po as per cardio; encourage po solute intake.  Question underlying lung disease present.   Echo with normal EF; mild DD;   Add bronchodilator empirically. Patient seen in follow up for MAXIMINO, CKD 3; hyponatremia; SOB last night; feeling better today.    MEDICATIONS  (STANDING):  allopurinol 100 milliGRAM(s) Oral daily  aspirin enteric coated 81 milliGRAM(s) Oral daily  dextrose 5%. 1000 milliLiter(s) (50 mL/Hr) IV Continuous <Continuous>  dextrose 50% Injectable 12.5 Gram(s) IV Push once  dextrose 50% Injectable 25 Gram(s) IV Push once  dextrose 50% Injectable 25 Gram(s) IV Push once  furosemide   Injectable 80 milliGRAM(s) IV Push daily  heparin  Injectable 5000 Unit(s) SubCutaneous every 12 hours  hydrALAZINE 50 milliGRAM(s) Oral every 8 hours  insulin glargine Injectable (LANTUS) 14 Unit(s) SubCutaneous every morning  insulin lispro (HumaLOG) corrective regimen sliding scale   SubCutaneous three times a day before meals  metoprolol succinate ER 25 milliGRAM(s) Oral daily  ranolazine 500 milliGRAM(s) Oral two times a day  sodium bicarbonate 325 milliGRAM(s) Oral two times a day  ticagrelor 90 milliGRAM(s) Oral two times a day    MEDICATIONS  (PRN):  dextrose Gel 1 Dose(s) Oral once PRN Blood Glucose LESS THAN 70 milliGRAM(s)/deciliter  glucagon  Injectable 1 milliGRAM(s) IntraMuscular once PRN Glucose LESS THAN 70 milligrams/deciliter    PHYSICAL EXAM:      T(C): 36.3 (10-06-17 @ 11:13), Max: 36.6 (10-05-17 @ 17:29)  HR: 60 (10-06-17 @ 11:13) (50 - 65)  BP: 127/48 (10-06-17 @ 11:13) (113/56 - 127/48)  RR: 18 (10-06-17 @ 11:13) (18 - 20)  SpO2: 97% (10-06-17 @ 11:13) (97% - 98%)  Wt(kg): --  Respiratory: clear anteriorly, decreased BS at bases no rales   Cardiovascular: S1 S2  Gastrointestinal: soft NT ND +BS  Extremities:   1-2 edema                                    9.6    9.5   )-----------( 217      ( 05 Oct 2017 08:29 )             30.1     10-06    127<L>  |  90<L>  |  57<H>  ----------------------------<  78  4.6   |  25  |  2.41<H>    Ca    8.8      06 Oct 2017 06:52    < from: TTE Echo Doppler w/o Cont (03.16.17 @ 14:08) >   Summary:   1.Left ventricular ejection fraction, by visual estimation, is 55 to   60%.   2. The left atrium is normal in size.   3. Normal left ventricular internal cavity size.   4. Spectral Doppler shows impaired relaxation pattern of left   ventricular myocardial filling (Grade I diastolic dysfunction).   5. Normal global left ventricular systolic function.   6. Thickening and calcification of the anterior and posterior mitral   valve leaflets.   7. Mild mitral valve regurgitation.   8. The right atrium isnormal in size.   9. Normal right ventricular size and function.  10. There is no evidence of pericardial effusion.  11. There is mild aortic root calcification.  12. Sclerotic aortic valve with decreased opening.  13. Mild mitral annular calcification.  14. Technically difficult study.  15. Degenerative tricuspid valve.              Assessment and Plan:  MAXIMINO, hypervolemic hyponatremia; elevated ADH state;   IV loop diuretic to po as per cardio; encourage po solute intake.  Question underlying lung disease present.   Echo with normal EF; mild DD;   Paraprotein screen  Add bronchodilator empirically.  CT chest; serologies.

## 2017-10-06 NOTE — PROGRESS NOTE ADULT - SUBJECTIVE AND OBJECTIVE BOX
Patient is a 85y old  Female who presents with a chief complaint of weakness (03 Oct 2017 23:26)      INTERVAL HPI/OVERNIGHT EVENTS: difficulties urinating? "not enough urine", "I feel like I'm getting SOB again"    MEDICATIONS  (STANDING):  aspirin enteric coated 81 milliGRAM(s) Oral daily  dextrose 5%. 1000 milliLiter(s) (50 mL/Hr) IV Continuous <Continuous>  dextrose 50% Injectable 12.5 Gram(s) IV Push once  dextrose 50% Injectable 25 Gram(s) IV Push once  dextrose 50% Injectable 25 Gram(s) IV Push once  furosemide   Injectable 80 milliGRAM(s) IV Push daily  heparin  Injectable 5000 Unit(s) SubCutaneous every 12 hours  hydrALAZINE 50 milliGRAM(s) Oral every 8 hours  insulin glargine Injectable (LANTUS) 14 Unit(s) SubCutaneous every morning  insulin lispro (HumaLOG) corrective regimen sliding scale   SubCutaneous three times a day before meals  metoprolol succinate ER 25 milliGRAM(s) Oral daily  ranolazine 500 milliGRAM(s) Oral two times a day  sodium bicarbonate 325 milliGRAM(s) Oral two times a day  ticagrelor 90 milliGRAM(s) Oral two times a day    MEDICATIONS  (PRN):  dextrose Gel 1 Dose(s) Oral once PRN Blood Glucose LESS THAN 70 milliGRAM(s)/deciliter  glucagon  Injectable 1 milliGRAM(s) IntraMuscular once PRN Glucose LESS THAN 70 milligrams/deciliter      Allergies    codeine (Other)    Intolerances        REVIEW OF SYSTEMS:        HEENT - wnl  RESPIRATORY: No cough, wheezing, chills or hemoptysis; No shortness of breath  CARDIOVASCULAR: No chest pain, palpitations, dizziness, or leg swelling  GASTROINTESTINAL: No abdominal or epigastric pain. No nausea, vomiting, or hematemesis; No diarrhea or constipation. No melena or hematochezia.  GENITOURINARY: No dysuria, frequency, hematuria, or incontinence  SKIN: No itching, burning, rashes, or lesions   MUSCULOSKELETAL: No joint pain or swelling; No muscle, back, or extremity pain  PSYCHIATRIC: No depression, anxiety, mood swings, or difficulty sleeping        Vital Signs Last 24 Hrs  T(C): 36.6 (06 Oct 2017 05:18), Max: 36.6 (05 Oct 2017 11:54)  T(F): 97.8 (06 Oct 2017 05:18), Max: 97.8 (05 Oct 2017 11:54)  HR: 61 (06 Oct 2017 05:18) (50 - 65)  BP: 113/56 (06 Oct 2017 05:18) (113/56 - 129/59)  BP(mean): --  RR: 18 (06 Oct 2017 05:18) (18 - 20)  SpO2: 97% (06 Oct 2017 05:18) (97% - 98%)    PHYSICAL EXAM:  general       HEENT wnl  CHEST/LUNG: Clear to percussion bilaterally; No rales, rhonchi, wheezing, or rubs  HEART: Regular rate and rhythm; No murmurs, rubs, or gallops  ABDOMEN: Soft, Nontender, Nondistended; Bowel sounds present  EXTREMITIES:  2+ Peripheral Pulses, No clubbing, cyanosis, or edema  LYMPH: No lymphadenopathy noted  SKIN: No rashes or lesions    LABS:                        9.6    9.5   )-----------( 217      ( 05 Oct 2017 08:29 )             30.1     10-06    127<L>  |  90<L>  |  57<H>  ----------------------------<  78  4.6   |  25  |  2.41<H>    Ca    8.8      06 Oct 2017 06:52          CAPILLARY BLOOD GLUCOSE  104 (06 Oct 2017 08:26)  116 (06 Oct 2017 00:22)  118 (05 Oct 2017 16:27)          CARDIAC MARKERS ( 06 Oct 2017 06:52 )  1.650 ng/mL / x     / x     / x     / x      CARDIAC MARKERS ( 05 Oct 2017 08:29 )  1.790 ng/mL / x     / x     / x     / x      CARDIAC MARKERS ( 04 Oct 2017 11:31 )  1.870 ng/mL / x     / x     / x     / x          Hemoglobin A1C, Whole Blood: 7.5 % (10-04 @ 09:00)        RADIOLOGY & ADDITIONAL TESTS:    Imaging Personally Reviewed:  [y ] YES  [ ] NO    Consultant(s) Notes Reviewed:  [y ] YES  [ ] NO    Care Discussed with Consultants/Other Providers [ ] YES  [ ] NO    PROBLEMS:  HYPONATREMIA  LOW SODIUM LEVELS , SENT BY PMD  Hyponatremia  CHF  Anemia                    Plans:  ? change diuretic to po?  Renal and bladder u/s  f/u BMP  Nephrology f/u

## 2017-10-07 LAB
ANION GAP SERPL CALC-SCNC: 9 MMOL/L — SIGNIFICANT CHANGE UP (ref 5–17)
BUN SERPL-MCNC: 58 MG/DL — HIGH (ref 7–23)
CALCIUM SERPL-MCNC: 9 MG/DL — SIGNIFICANT CHANGE UP (ref 8.5–10.1)
CHLORIDE SERPL-SCNC: 89 MMOL/L — LOW (ref 96–108)
CO2 SERPL-SCNC: 27 MMOL/L — SIGNIFICANT CHANGE UP (ref 22–31)
CREAT SERPL-MCNC: 2.38 MG/DL — HIGH (ref 0.5–1.3)
GLUCOSE SERPL-MCNC: 86 MG/DL — SIGNIFICANT CHANGE UP (ref 70–99)
IGA FLD-MCNC: 109 MG/DL — SIGNIFICANT CHANGE UP (ref 68–378)
IGG FLD-MCNC: 2290 MG/DL — HIGH (ref 694–1618)
IGM SERPL-MCNC: 26 MG/DL — LOW (ref 40–230)
KAPPA LC SER QL IFE: 15.3 MG/DL — HIGH (ref 0.33–1.94)
KAPPA/LAMBDA FREE LIGHT CHAIN RATIO, SERUM: 7.93 RATIO — HIGH (ref 0.26–1.65)
LAMBDA LC SER QL IFE: 1.93 MG/DL — SIGNIFICANT CHANGE UP (ref 0.57–2.63)
OSMOLALITY SERPL: 276 MOS/KG — SIGNIFICANT CHANGE UP (ref 275–300)
POTASSIUM SERPL-MCNC: 4.5 MMOL/L — SIGNIFICANT CHANGE UP (ref 3.5–5.3)
POTASSIUM SERPL-SCNC: 4.5 MMOL/L — SIGNIFICANT CHANGE UP (ref 3.5–5.3)
PROT ?TM UR-MCNC: 32 MG/DL — HIGH (ref 0–12)
PROT SERPL-MCNC: 7.3 G/DL — SIGNIFICANT CHANGE UP (ref 6–8.3)
PROT SERPL-MCNC: 7.3 G/DL — SIGNIFICANT CHANGE UP (ref 6–8.3)
RHEUMATOID FACT SERPL-ACNC: <7 IU/ML — SIGNIFICANT CHANGE UP (ref 0–13.9)
SODIUM SERPL-SCNC: 125 MMOL/L — LOW (ref 135–145)

## 2017-10-07 PROCEDURE — 76770 US EXAM ABDO BACK WALL COMP: CPT | Mod: 26

## 2017-10-07 PROCEDURE — 71250 CT THORAX DX C-: CPT | Mod: 26

## 2017-10-07 RX ORDER — BUDESONIDE AND FORMOTEROL FUMARATE DIHYDRATE 160; 4.5 UG/1; UG/1
2 AEROSOL RESPIRATORY (INHALATION)
Qty: 0 | Refills: 0 | Status: DISCONTINUED | OUTPATIENT
Start: 2017-10-07 | End: 2017-10-11

## 2017-10-07 RX ORDER — DOCUSATE SODIUM 100 MG
200 CAPSULE ORAL DAILY
Qty: 0 | Refills: 0 | Status: DISCONTINUED | OUTPATIENT
Start: 2017-10-07 | End: 2017-10-11

## 2017-10-07 RX ORDER — ONDANSETRON 8 MG/1
4 TABLET, FILM COATED ORAL ONCE
Qty: 0 | Refills: 0 | Status: COMPLETED | OUTPATIENT
Start: 2017-10-07 | End: 2017-10-07

## 2017-10-07 RX ORDER — POLYETHYLENE GLYCOL 3350 17 G/17G
17 POWDER, FOR SOLUTION ORAL
Qty: 0 | Refills: 0 | Status: DISCONTINUED | OUTPATIENT
Start: 2017-10-07 | End: 2017-10-11

## 2017-10-07 RX ORDER — BRIMONIDINE TARTRATE, TIMOLOL MALEATE 2; 5 MG/ML; MG/ML
1 SOLUTION/ DROPS OPHTHALMIC EVERY 12 HOURS
Qty: 0 | Refills: 0 | Status: DISCONTINUED | OUTPATIENT
Start: 2017-10-07 | End: 2017-10-11

## 2017-10-07 RX ORDER — DORZOLAMIDE HYDROCHLORIDE 20 MG/ML
1 SOLUTION/ DROPS OPHTHALMIC
Qty: 0 | Refills: 0 | Status: DISCONTINUED | OUTPATIENT
Start: 2017-10-07 | End: 2017-10-07

## 2017-10-07 RX ORDER — SENNA PLUS 8.6 MG/1
2 TABLET ORAL AT BEDTIME
Qty: 0 | Refills: 0 | Status: DISCONTINUED | OUTPATIENT
Start: 2017-10-07 | End: 2017-10-11

## 2017-10-07 RX ADMIN — Medication 325 MILLIGRAM(S): at 05:53

## 2017-10-07 RX ADMIN — Medication 25 MILLIGRAM(S): at 05:53

## 2017-10-07 RX ADMIN — RANOLAZINE 500 MILLIGRAM(S): 500 TABLET, FILM COATED, EXTENDED RELEASE ORAL at 17:44

## 2017-10-07 RX ADMIN — Medication 50 MILLIGRAM(S): at 13:55

## 2017-10-07 RX ADMIN — Medication 81 MILLIGRAM(S): at 11:55

## 2017-10-07 RX ADMIN — RANOLAZINE 500 MILLIGRAM(S): 500 TABLET, FILM COATED, EXTENDED RELEASE ORAL at 05:54

## 2017-10-07 RX ADMIN — SENNA PLUS 2 TABLET(S): 8.6 TABLET ORAL at 21:13

## 2017-10-07 RX ADMIN — INSULIN GLARGINE 14 UNIT(S): 100 INJECTION, SOLUTION SUBCUTANEOUS at 09:52

## 2017-10-07 RX ADMIN — Medication 50 MILLIGRAM(S): at 21:14

## 2017-10-07 RX ADMIN — Medication 325 MILLIGRAM(S): at 17:44

## 2017-10-07 RX ADMIN — TICAGRELOR 90 MILLIGRAM(S): 90 TABLET ORAL at 17:44

## 2017-10-07 RX ADMIN — ONDANSETRON 4 MILLIGRAM(S): 8 TABLET, FILM COATED ORAL at 13:18

## 2017-10-07 RX ADMIN — Medication 100 MILLIGRAM(S): at 11:55

## 2017-10-07 RX ADMIN — POLYETHYLENE GLYCOL 3350 17 GRAM(S): 17 POWDER, FOR SOLUTION ORAL at 11:55

## 2017-10-07 RX ADMIN — POLYETHYLENE GLYCOL 3350 17 GRAM(S): 17 POWDER, FOR SOLUTION ORAL at 17:44

## 2017-10-07 RX ADMIN — BUMETANIDE 2 MILLIGRAM(S): 0.25 INJECTION INTRAMUSCULAR; INTRAVENOUS at 06:34

## 2017-10-07 RX ADMIN — BUDESONIDE AND FORMOTEROL FUMARATE DIHYDRATE 2 PUFF(S): 160; 4.5 AEROSOL RESPIRATORY (INHALATION) at 17:44

## 2017-10-07 RX ADMIN — HEPARIN SODIUM 5000 UNIT(S): 5000 INJECTION INTRAVENOUS; SUBCUTANEOUS at 05:53

## 2017-10-07 RX ADMIN — TICAGRELOR 90 MILLIGRAM(S): 90 TABLET ORAL at 05:53

## 2017-10-07 RX ADMIN — Medication 50 MILLIGRAM(S): at 05:54

## 2017-10-07 RX ADMIN — Medication 200 MILLIGRAM(S): at 11:55

## 2017-10-07 NOTE — CONSULT NOTE ADULT - SUBJECTIVE AND OBJECTIVE BOX
consult dictated [phone]   cardiorenal syndrome with gg from chf.  no significant chronic obstructive pulmonary disease  thanks   Parveen Ramirez

## 2017-10-07 NOTE — PROGRESS NOTE ADULT - SUBJECTIVE AND OBJECTIVE BOX
NEPHROLOGY NOTED  DYSPNEA WITH EXERTION  CLEAR LUNGS  JVP 8  REGULAR RATE_&_RHYTHM;NORMAL_S1&S2_NO_SIGNIFICANT_MURMURS,RUBS,OR_GALLOPS.   EDEMA    ACUTE ON CHRONIC SYSTOLIC HEART FAILURE   chronic kidney disease  HYPONATREMIA SECONDARY TO VOLUME OVERLOAD     CONTINUING PRESENT CARE; MAY NEED TO HOLD DIURETIC IF RENAL FUNCTION WORSENS.

## 2017-10-07 NOTE — PROGRESS NOTE ADULT - SUBJECTIVE AND OBJECTIVE BOX
Patient is a 85y old  Female who presents with a chief complaint of weakness (03 Oct 2017 23:26)      INTERVAL HPI/OVERNIGHT EVENTS:    MEDICATIONS  (STANDING):  allopurinol 100 milliGRAM(s) Oral daily  aspirin enteric coated 81 milliGRAM(s) Oral daily  buMETAnide 2 milliGRAM(s) Oral daily  dextrose 5%. 1000 milliLiter(s) (50 mL/Hr) IV Continuous <Continuous>  dextrose 50% Injectable 12.5 Gram(s) IV Push once  dextrose 50% Injectable 25 Gram(s) IV Push once  dextrose 50% Injectable 25 Gram(s) IV Push once  heparin  Injectable 5000 Unit(s) SubCutaneous every 12 hours  hydrALAZINE 50 milliGRAM(s) Oral every 8 hours  insulin glargine Injectable (LANTUS) 14 Unit(s) SubCutaneous every morning  insulin lispro (HumaLOG) corrective regimen sliding scale   SubCutaneous three times a day before meals  metoprolol succinate ER 25 milliGRAM(s) Oral daily  ranolazine 500 milliGRAM(s) Oral two times a day  sodium bicarbonate 325 milliGRAM(s) Oral two times a day  ticagrelor 90 milliGRAM(s) Oral two times a day    MEDICATIONS  (PRN):  ALBUTerol    0.083% 2.5 milliGRAM(s) Nebulizer every 8 hours PRN Shortness of Breath and/or Wheezing  dextrose Gel 1 Dose(s) Oral once PRN Blood Glucose LESS THAN 70 milliGRAM(s)/deciliter  glucagon  Injectable 1 milliGRAM(s) IntraMuscular once PRN Glucose LESS THAN 70 milligrams/deciliter      Allergies    codeine (Other)    Intolerances        REVIEW OF SYSTEMS:        HEENT - wnl  RESPIRATORY: + SOB  CARDIOVASCULAR: No chest pain, palpitations, dizziness, or leg swelling  GASTROINTESTINAL: No abdominal or epigastric pain. No nausea, vomiting, or hematemesis; CONSTIPATION  GENITOURINARY: No dysuria, frequency, hematuria, or incontinence  SKIN: No itching, burning, rashes, or lesions   MUSCULOSKELETAL: No joint pain or swelling; No muscle, back, or extremity pain  PSYCHIATRIC: No depression, anxiety, mood swings, or difficulty sleeping        Vital Signs Last 24 Hrs  T(C): 36.9 (07 Oct 2017 06:10), Max: 36.9 (07 Oct 2017 00:04)  T(F): 98.4 (07 Oct 2017 06:10), Max: 98.4 (07 Oct 2017 00:04)  HR: 60 (07 Oct 2017 06:10) (56 - 60)  BP: 129/59 (07 Oct 2017 06:10) (114/44 - 136/49)  BP(mean): --  RR: 18 (07 Oct 2017 06:10) (16 - 18)  SpO2: 96% (07 Oct 2017 06:10) (96% - 100%)    PHYSICAL EXAM:  general       HEENT wnl  CHEST/LUNG: Clear to percussion bilaterally; No rales, rhonchi, wheezing, or rubs  HEART: Regular rate and rhythm; No murmurs, rubs, or gallops  ABDOMEN: Soft, Nontender, Nondistended; Bowel sounds present  EXTREMITIES:  2+ Peripheral Pulses, No clubbing, cyanosis, or edema  LYMPH: No lymphadenopathy noted  SKIN: No rashes or lesions    LABS:    10-07    125<L>  |  89<L>  |  58<H>  ----------------------------<  86  4.5   |  27  |  2.38<H>    Ca    9.0      07 Oct 2017 07:14          CAPILLARY BLOOD GLUCOSE  104 (07 Oct 2017 07:59)  203 (06 Oct 2017 20:31)  209 (06 Oct 2017 16:50)  202 (06 Oct 2017 11:52)          CARDIAC MARKERS ( 06 Oct 2017 06:52 )  1.650 ng/mL / x     / x     / x     / x          Hemoglobin A1C, Whole Blood: 7.5 % (10-04 @ 09:00)    Na from 127 to 125        RADIOLOGY & ADDITIONAL TESTS: CT - COPD, pleural effusion, distended GB  Imaging Personally Reviewed:  [y ] YES  [ ] NO    Consultant(s) Notes Reviewed:  [y ] YES  [ ] NO    Care Discussed with Consultants/Other Providers [ ] YES  [ ] NO    PROBLEMS:  HYPONATREMIA  LOW SODIUM LEVELS , SENT BY PMD    CHF  COPD  pleural effusion  Anemia, < WBC  Distended GB?  constipation        imp:    stable[ v]               Plans:  oral diuretic a per nephrologist.. ACE on hold. Abdominal u/s and renal and bladder  Laxatives  Advair 250/50 1 puff bid  pulmonary consult

## 2017-10-07 NOTE — PROGRESS NOTE ADULT - SUBJECTIVE AND OBJECTIVE BOX
Subjective: no dyspnea this am      MEDICATIONS  (STANDING):  allopurinol 100 milliGRAM(s) Oral daily  aspirin enteric coated 81 milliGRAM(s) Oral daily  buMETAnide 2 milliGRAM(s) Oral daily  dextrose 5%. 1000 milliLiter(s) (50 mL/Hr) IV Continuous <Continuous>  dextrose 50% Injectable 12.5 Gram(s) IV Push once  dextrose 50% Injectable 25 Gram(s) IV Push once  dextrose 50% Injectable 25 Gram(s) IV Push once  heparin  Injectable 5000 Unit(s) SubCutaneous every 12 hours  hydrALAZINE 50 milliGRAM(s) Oral every 8 hours  insulin glargine Injectable (LANTUS) 14 Unit(s) SubCutaneous every morning  insulin lispro (HumaLOG) corrective regimen sliding scale   SubCutaneous three times a day before meals  metoprolol succinate ER 25 milliGRAM(s) Oral daily  ranolazine 500 milliGRAM(s) Oral two times a day  sodium bicarbonate 325 milliGRAM(s) Oral two times a day  ticagrelor 90 milliGRAM(s) Oral two times a day    MEDICATIONS  (PRN):  ALBUTerol    0.083% 2.5 milliGRAM(s) Nebulizer every 8 hours PRN Shortness of Breath and/or Wheezing  dextrose Gel 1 Dose(s) Oral once PRN Blood Glucose LESS THAN 70 milliGRAM(s)/deciliter  glucagon  Injectable 1 milliGRAM(s) IntraMuscular once PRN Glucose LESS THAN 70 milligrams/deciliter          T(C): 36.9 (10-07-17 @ 06:10), Max: 36.9 (10-07-17 @ 00:04)  HR: 60 (10-07-17 @ 06:10) (56 - 60)  BP: 129/59 (10-07-17 @ 06:10) (114/44 - 136/49)  RR: 18 (10-07-17 @ 06:10) (16 - 18)  SpO2: 96% (10-07-17 @ 06:10) (96% - 100%)  Wt(kg): --        I&O's Detail    06 Oct 2017 07:01  -  07 Oct 2017 07:00  --------------------------------------------------------  IN:    Oral Fluid: 960 mL  Total IN: 960 mL    OUT:  Total OUT: 0 mL    Total NET: 960 mL               PHYSICAL EXAM:    GENERAL: comfortable  NECK: Supple, no inc in JVP  CHEST/LUNG: Clear  HEART: S1S2  ABDOMEN: Soft, Nontender, Nondistended; Bowel sounds present  EXTREMITIES:  dec edema. L TMA  NEURO: no asterixis      LABS:  CBC Full  -  ( 05 Oct 2017 08:29 )  WBC Count : 9.5 K/uL  Hemoglobin : 9.6 g/dL  Hematocrit : 30.1 %  Platelet Count - Automated : 217 K/uL  Mean Cell Volume : 88.8 fl  Mean Cell Hemoglobin : 28.3 pg  Mean Cell Hemoglobin Concentration : 31.8 gm/dL  Auto Neutrophil # : x  Auto Lymphocyte # : x  Auto Monocyte # : x  Auto Eosinophil # : x  Auto Basophil # : x  Auto Neutrophil % : x  Auto Lymphocyte % : x  Auto Monocyte % : x  Auto Eosinophil % : x  Auto Basophil % : x    10-06    127<L>  |  90<L>  |  57<H>  ----------------------------<  78  4.6   |  25  |  2.41<H>    Ca    8.8      06 Oct 2017 06:52          Culture Results:   Culture grew 3 or more types of organisms which indicate  collection contamination; consider recollection only if clinically  indicated. (10-04 @ 10:09)        ASSESSMENT and PLAN:  Impression:  * CKD3. Stable  * MAXIMINO -- pre-renal  * HypoNa -- presumed hypervolemic    Recommendations:  * Follow Cr on PO diuretic. However, if Cr rises further, may need to give a diuretic holiday  * GN serologies, paraproteinemia screen ordered.   * Oral FR 1000cc/day  * Repeat BMP in am

## 2017-10-08 LAB
% ALBUMIN: 46.6 % — SIGNIFICANT CHANGE UP
% ALPHA 1: 4.1 % — SIGNIFICANT CHANGE UP
% ALPHA 2: 9.7 % — SIGNIFICANT CHANGE UP
% BETA: 9.1 % — SIGNIFICANT CHANGE UP
% GAMMA: 30.5 % — SIGNIFICANT CHANGE UP
% M SPIKE: 24.1 % — SIGNIFICANT CHANGE UP
ALBUMIN SERPL ELPH-MCNC: 3 G/DL — LOW (ref 3.3–5)
ALBUMIN SERPL ELPH-MCNC: 3.4 G/DL — LOW (ref 3.6–5.5)
ALBUMIN/GLOB SERPL ELPH: 0.9 RATIO — SIGNIFICANT CHANGE UP
ALP SERPL-CCNC: 72 U/L — SIGNIFICANT CHANGE UP (ref 40–120)
ALPHA1 GLOB SERPL ELPH-MCNC: 0.3 G/DL — SIGNIFICANT CHANGE UP (ref 0.1–0.4)
ALPHA2 GLOB SERPL ELPH-MCNC: 0.7 G/DL — SIGNIFICANT CHANGE UP (ref 0.5–1)
ALT FLD-CCNC: 20 U/L — SIGNIFICANT CHANGE UP (ref 12–78)
ANION GAP SERPL CALC-SCNC: 12 MMOL/L — SIGNIFICANT CHANGE UP (ref 5–17)
AST SERPL-CCNC: 18 U/L — SIGNIFICANT CHANGE UP (ref 15–37)
AUTO DIFF PNL BLD: NEGATIVE — SIGNIFICANT CHANGE UP
B-GLOBULIN SERPL ELPH-MCNC: 0.7 G/DL — SIGNIFICANT CHANGE UP (ref 0.5–1)
BILIRUB SERPL-MCNC: 0.3 MG/DL — SIGNIFICANT CHANGE UP (ref 0.2–1.2)
BUN SERPL-MCNC: 53 MG/DL — HIGH (ref 7–23)
C-ANCA SER-ACNC: NEGATIVE — SIGNIFICANT CHANGE UP
CALCIUM SERPL-MCNC: 9.5 MG/DL — SIGNIFICANT CHANGE UP (ref 8.5–10.1)
CHLORIDE SERPL-SCNC: 90 MMOL/L — LOW (ref 96–108)
CO2 SERPL-SCNC: 25 MMOL/L — SIGNIFICANT CHANGE UP (ref 22–31)
CREAT SERPL-MCNC: 2.21 MG/DL — HIGH (ref 0.5–1.3)
GAMMA GLOBULIN: 2.2 G/DL — HIGH (ref 0.6–1.6)
GLUCOSE SERPL-MCNC: 89 MG/DL — SIGNIFICANT CHANGE UP (ref 70–99)
HCT VFR BLD CALC: 32.4 % — LOW (ref 34.5–45)
HGB BLD-MCNC: 10.3 G/DL — LOW (ref 11.5–15.5)
INTERPRETATION SERPL IFE-IMP: SIGNIFICANT CHANGE UP
M-SPIKE: 1.8 G/DL — HIGH (ref 0–0)
MCHC RBC-ENTMCNC: 28.7 PG — SIGNIFICANT CHANGE UP (ref 27–34)
MCHC RBC-ENTMCNC: 31.7 GM/DL — LOW (ref 32–36)
MCV RBC AUTO: 90.6 FL — SIGNIFICANT CHANGE UP (ref 80–100)
P-ANCA SER-ACNC: NEGATIVE — SIGNIFICANT CHANGE UP
PLATELET # BLD AUTO: 216 K/UL — SIGNIFICANT CHANGE UP (ref 150–400)
POTASSIUM SERPL-MCNC: 4.2 MMOL/L — SIGNIFICANT CHANGE UP (ref 3.5–5.3)
POTASSIUM SERPL-SCNC: 4.2 MMOL/L — SIGNIFICANT CHANGE UP (ref 3.5–5.3)
PROT PATTERN SERPL ELPH-IMP: SIGNIFICANT CHANGE UP
PROT SERPL-MCNC: 7.9 GM/DL — SIGNIFICANT CHANGE UP (ref 6–8.3)
RBC # BLD: 3.57 M/UL — LOW (ref 3.8–5.2)
RBC # FLD: 13.8 % — SIGNIFICANT CHANGE UP (ref 11–15)
SODIUM SERPL-SCNC: 127 MMOL/L — LOW (ref 135–145)
WBC # BLD: 8.9 K/UL — SIGNIFICANT CHANGE UP (ref 3.8–10.5)
WBC # FLD AUTO: 8.9 K/UL — SIGNIFICANT CHANGE UP (ref 3.8–10.5)

## 2017-10-08 PROCEDURE — 93010 ELECTROCARDIOGRAM REPORT: CPT

## 2017-10-08 RX ORDER — BRINZOLAMIDE 10 MG/ML
1 SUSPENSION/ DROPS OPHTHALMIC
Qty: 0 | Refills: 0 | Status: DISCONTINUED | OUTPATIENT
Start: 2017-10-08 | End: 2017-10-11

## 2017-10-08 RX ADMIN — TICAGRELOR 90 MILLIGRAM(S): 90 TABLET ORAL at 18:03

## 2017-10-08 RX ADMIN — Medication 325 MILLIGRAM(S): at 18:03

## 2017-10-08 RX ADMIN — Medication 81 MILLIGRAM(S): at 12:32

## 2017-10-08 RX ADMIN — TICAGRELOR 90 MILLIGRAM(S): 90 TABLET ORAL at 05:46

## 2017-10-08 RX ADMIN — SENNA PLUS 2 TABLET(S): 8.6 TABLET ORAL at 22:26

## 2017-10-08 RX ADMIN — Medication 50 MILLIGRAM(S): at 22:26

## 2017-10-08 RX ADMIN — BUMETANIDE 2 MILLIGRAM(S): 0.25 INJECTION INTRAMUSCULAR; INTRAVENOUS at 05:45

## 2017-10-08 RX ADMIN — RANOLAZINE 500 MILLIGRAM(S): 500 TABLET, FILM COATED, EXTENDED RELEASE ORAL at 05:46

## 2017-10-08 RX ADMIN — Medication 50 MILLIGRAM(S): at 15:14

## 2017-10-08 RX ADMIN — INSULIN GLARGINE 14 UNIT(S): 100 INJECTION, SOLUTION SUBCUTANEOUS at 08:48

## 2017-10-08 RX ADMIN — Medication 100 MILLIGRAM(S): at 12:32

## 2017-10-08 RX ADMIN — Medication 325 MILLIGRAM(S): at 05:46

## 2017-10-08 RX ADMIN — HEPARIN SODIUM 5000 UNIT(S): 5000 INJECTION INTRAVENOUS; SUBCUTANEOUS at 18:03

## 2017-10-08 RX ADMIN — HEPARIN SODIUM 5000 UNIT(S): 5000 INJECTION INTRAVENOUS; SUBCUTANEOUS at 05:47

## 2017-10-08 RX ADMIN — Medication 50 MILLIGRAM(S): at 05:46

## 2017-10-08 RX ADMIN — BUDESONIDE AND FORMOTEROL FUMARATE DIHYDRATE 2 PUFF(S): 160; 4.5 AEROSOL RESPIRATORY (INHALATION) at 18:07

## 2017-10-08 RX ADMIN — BRIMONIDINE TARTRATE, TIMOLOL MALEATE 1 DROP(S): 2; 5 SOLUTION/ DROPS OPHTHALMIC at 19:16

## 2017-10-08 RX ADMIN — Medication 25 MILLIGRAM(S): at 05:46

## 2017-10-08 RX ADMIN — RANOLAZINE 500 MILLIGRAM(S): 500 TABLET, FILM COATED, EXTENDED RELEASE ORAL at 18:03

## 2017-10-08 NOTE — PROGRESS NOTE ADULT - SUBJECTIVE AND OBJECTIVE BOX
RUBALCAVA PLACED  NO JVD  CLEAR LUNGS  NORMAL HEART SOUNDS  RIGHT > LEFT EDEMA    BUN/CR: 50'S/2'S    CONTINUING ORAL DIURETIC/BUMEX AND PRESENT CV CARE

## 2017-10-08 NOTE — PROGRESS NOTE ADULT - SUBJECTIVE AND OBJECTIVE BOX
Subjective: had a BM last night. Improving appetite. No longer SOB      MEDICATIONS  (STANDING):  allopurinol 100 milliGRAM(s) Oral daily  aspirin enteric coated 81 milliGRAM(s) Oral daily  brimonidine 0.2%/ timolol 0.5% Ophthalmic Solution 1 Drop(s) Both EYES every 12 hours  buDESOnide 160 MICROgram(s)/formoterol 4.5 MICROgram(s) Inhaler 2 Puff(s) Inhalation two times a day  buMETAnide 2 milliGRAM(s) Oral daily  dextrose 5%. 1000 milliLiter(s) (50 mL/Hr) IV Continuous <Continuous>  dextrose 50% Injectable 12.5 Gram(s) IV Push once  dextrose 50% Injectable 25 Gram(s) IV Push once  dextrose 50% Injectable 25 Gram(s) IV Push once  docusate sodium 200 milliGRAM(s) Oral daily  heparin  Injectable 5000 Unit(s) SubCutaneous every 12 hours  hydrALAZINE 50 milliGRAM(s) Oral every 8 hours  insulin glargine Injectable (LANTUS) 14 Unit(s) SubCutaneous every morning  insulin lispro (HumaLOG) corrective regimen sliding scale   SubCutaneous three times a day before meals  metoprolol succinate ER 25 milliGRAM(s) Oral daily  polyethylene glycol 3350 17 Gram(s) Oral two times a day  ranolazine 500 milliGRAM(s) Oral two times a day  senna 2 Tablet(s) Oral at bedtime  sodium bicarbonate 325 milliGRAM(s) Oral two times a day  ticagrelor 90 milliGRAM(s) Oral two times a day    MEDICATIONS  (PRN):  ALBUTerol    0.083% 2.5 milliGRAM(s) Nebulizer every 8 hours PRN Shortness of Breath and/or Wheezing  dextrose Gel 1 Dose(s) Oral once PRN Blood Glucose LESS THAN 70 milliGRAM(s)/deciliter  glucagon  Injectable 1 milliGRAM(s) IntraMuscular once PRN Glucose LESS THAN 70 milligrams/deciliter          T(C): 36.6 (10-08-17 @ 05:42), Max: 36.7 (10-08-17 @ 00:13)  HR: 67 (10-08-17 @ 05:42) (60 - 67)  BP: 138/65 (10-08-17 @ 05:42) (129/55 - 156/68)  RR: 18 (10-08-17 @ 05:42) (17 - 18)  SpO2: 95% (10-08-17 @ 05:42) (95% - 95%)  Wt(kg): --        I&O's Detail    07 Oct 2017 07:01  -  08 Oct 2017 07:00  --------------------------------------------------------  IN:    Oral Fluid: 240 mL  Total IN: 240 mL    OUT:    Voided: 120 mL  Total OUT: 120 mL    Total NET: 120 mL               PHYSICAL EXAM:    GENERAL: comfortable  NECK: Supple, no inc in JVP  CHEST/LUNG: Clear  HEART: S1S2  ABDOMEN: Soft, Nontender, Nondistended; Bowel sounds present  EXTREMITIES:  dec edema. L TMA  NEURO: no asterixis        LABS:  CBC Full  -  ( 08 Oct 2017 07:12 )  WBC Count : 8.9 K/uL  Hemoglobin : 10.3 g/dL  Hematocrit : 32.4 %  Platelet Count - Automated : 216 K/uL  Mean Cell Volume : 90.6 fl  Mean Cell Hemoglobin : 28.7 pg  Mean Cell Hemoglobin Concentration : 31.7 gm/dL  Auto Neutrophil # : x  Auto Lymphocyte # : x  Auto Monocyte # : x  Auto Eosinophil # : x  Auto Basophil # : x  Auto Neutrophil % : x  Auto Lymphocyte % : x  Auto Monocyte % : x  Auto Eosinophil % : x  Auto Basophil % : x    10-08    127<L>  |  90<L>  |  53<H>  ----------------------------<  89  4.2   |  25  |  2.21<H>    Ca    9.5      08 Oct 2017 07:12    TPro  7.9  /  Alb  3.0<L>  /  TBili  0.3  /  DBili  x   /  AST  18  /  ALT  20  /  AlkPhos  72  10-08        ASSESSMENT and PLAN:    Impression:  * CKD3. Stable  * MAXIMINO -- pre-renal  * HypoNa -- presumed hypervolemic    Recommendations:  * Follow Cr on PO diuretic. However, if Cr rises further, may need to give a diuretic holiday  * GN serologies, paraproteinemia screen ordered.   * Oral FR 1000cc/day  * Repeat BMP in am

## 2017-10-09 DIAGNOSIS — D89.2 HYPERGAMMAGLOBULINEMIA, UNSPECIFIED: ICD-10-CM

## 2017-10-09 LAB
ANA PAT FLD IF-IMP: ABNORMAL
ANA TITR SER: ABNORMAL
GBM IGG SER-ACNC: <0.2 U — SIGNIFICANT CHANGE UP
M PROTEIN 24H UR ELPH-MRATE: PRESENT

## 2017-10-09 PROCEDURE — 76700 US EXAM ABDOM COMPLETE: CPT | Mod: 26

## 2017-10-09 RX ADMIN — Medication 100 MILLIGRAM(S): at 11:40

## 2017-10-09 RX ADMIN — Medication 81 MILLIGRAM(S): at 11:39

## 2017-10-09 RX ADMIN — HEPARIN SODIUM 5000 UNIT(S): 5000 INJECTION INTRAVENOUS; SUBCUTANEOUS at 06:30

## 2017-10-09 RX ADMIN — Medication 25 MILLIGRAM(S): at 06:26

## 2017-10-09 RX ADMIN — TICAGRELOR 90 MILLIGRAM(S): 90 TABLET ORAL at 17:16

## 2017-10-09 RX ADMIN — TICAGRELOR 90 MILLIGRAM(S): 90 TABLET ORAL at 06:26

## 2017-10-09 RX ADMIN — BRINZOLAMIDE 1 DROP(S): 10 SUSPENSION/ DROPS OPHTHALMIC at 17:17

## 2017-10-09 RX ADMIN — BRINZOLAMIDE 1 DROP(S): 10 SUSPENSION/ DROPS OPHTHALMIC at 06:28

## 2017-10-09 RX ADMIN — Medication 325 MILLIGRAM(S): at 06:26

## 2017-10-09 RX ADMIN — Medication 325 MILLIGRAM(S): at 17:16

## 2017-10-09 RX ADMIN — SENNA PLUS 2 TABLET(S): 8.6 TABLET ORAL at 22:42

## 2017-10-09 RX ADMIN — POLYETHYLENE GLYCOL 3350 17 GRAM(S): 17 POWDER, FOR SOLUTION ORAL at 06:30

## 2017-10-09 RX ADMIN — Medication 50 MILLIGRAM(S): at 22:42

## 2017-10-09 RX ADMIN — BRIMONIDINE TARTRATE, TIMOLOL MALEATE 1 DROP(S): 2; 5 SOLUTION/ DROPS OPHTHALMIC at 06:27

## 2017-10-09 RX ADMIN — RANOLAZINE 500 MILLIGRAM(S): 500 TABLET, FILM COATED, EXTENDED RELEASE ORAL at 17:16

## 2017-10-09 RX ADMIN — BUDESONIDE AND FORMOTEROL FUMARATE DIHYDRATE 2 PUFF(S): 160; 4.5 AEROSOL RESPIRATORY (INHALATION) at 06:28

## 2017-10-09 RX ADMIN — HEPARIN SODIUM 5000 UNIT(S): 5000 INJECTION INTRAVENOUS; SUBCUTANEOUS at 17:16

## 2017-10-09 RX ADMIN — BRIMONIDINE TARTRATE, TIMOLOL MALEATE 1 DROP(S): 2; 5 SOLUTION/ DROPS OPHTHALMIC at 17:17

## 2017-10-09 RX ADMIN — RANOLAZINE 500 MILLIGRAM(S): 500 TABLET, FILM COATED, EXTENDED RELEASE ORAL at 06:26

## 2017-10-09 RX ADMIN — Medication 4: at 07:54

## 2017-10-09 RX ADMIN — BUMETANIDE 2 MILLIGRAM(S): 0.25 INJECTION INTRAMUSCULAR; INTRAVENOUS at 06:29

## 2017-10-09 RX ADMIN — Medication 2: at 11:40

## 2017-10-09 RX ADMIN — Medication 200 MILLIGRAM(S): at 11:40

## 2017-10-09 RX ADMIN — Medication 50 MILLIGRAM(S): at 06:26

## 2017-10-09 NOTE — PROGRESS NOTE ADULT - SUBJECTIVE AND OBJECTIVE BOX
Patient seen in follow up for MAXIMINO, hyponatremia; complaining of " gas"; no chest pain; mild dyspnea exertion.     MEDICATIONS  (STANDING):  allopurinol 100 milliGRAM(s) Oral daily  aspirin enteric coated 81 milliGRAM(s) Oral daily  brimonidine 0.2%/ timolol 0.5% Ophthalmic Solution 1 Drop(s) Both EYES every 12 hours  brinzolamide 1% Ophthalmic Suspension 1 Drop(s) Both EYES two times a day  buDESOnide 160 MICROgram(s)/formoterol 4.5 MICROgram(s) Inhaler 2 Puff(s) Inhalation two times a day  buMETAnide 2 milliGRAM(s) Oral daily  dextrose 5%. 1000 milliLiter(s) (50 mL/Hr) IV Continuous <Continuous>  dextrose 50% Injectable 12.5 Gram(s) IV Push once  dextrose 50% Injectable 25 Gram(s) IV Push once  dextrose 50% Injectable 25 Gram(s) IV Push once  docusate sodium 200 milliGRAM(s) Oral daily  heparin  Injectable 5000 Unit(s) SubCutaneous every 12 hours  hydrALAZINE 50 milliGRAM(s) Oral every 8 hours  insulin glargine Injectable (LANTUS) 14 Unit(s) SubCutaneous every morning  insulin lispro (HumaLOG) corrective regimen sliding scale   SubCutaneous three times a day before meals  metoprolol succinate ER 25 milliGRAM(s) Oral daily  polyethylene glycol 3350 17 Gram(s) Oral two times a day  ranolazine 500 milliGRAM(s) Oral two times a day  senna 2 Tablet(s) Oral at bedtime  sodium bicarbonate 325 milliGRAM(s) Oral two times a day  ticagrelor 90 milliGRAM(s) Oral two times a day    MEDICATIONS  (PRN):  ALBUTerol    0.083% 2.5 milliGRAM(s) Nebulizer every 8 hours PRN Shortness of Breath and/or Wheezing  dextrose Gel 1 Dose(s) Oral once PRN Blood Glucose LESS THAN 70 milliGRAM(s)/deciliter  glucagon  Injectable 1 milliGRAM(s) IntraMuscular once PRN Glucose LESS THAN 70 milligrams/deciliter    PHYSICAL EXAM:      T(C): 36.5 (10-09-17 @ 11:04), Max: 37.1 (10-08-17 @ 23:19)  HR: 63 (10-09-17 @ 11:04) (63 - 68)  BP: 120/52 (10-09-17 @ 11:04) (120/52 - 139/56)  RR: 18 (10-09-17 @ 11:04) (16 - 18)  SpO2: 98% (10-09-17 @ 11:04) (98% - 100%)  Wt(kg): --  Respiratory: clear anteriorly, decreased BS at bases  Cardiovascular: S1 S2  Gastrointestinal: soft NT ND +BS  Extremities:   1 edema tma                                    10.3   8.9   )-----------( 216      ( 08 Oct 2017 07:12 )             32.4     10-08    127<L>  |  90<L>  |  53<H>  ----------------------------<  89  4.2   |  25  |  2.21<H>    Ca    9.5      08 Oct 2017 07:12    TPro  7.9  /  Alb  3.0<L>  /  TBili  0.3  /  DBili  x   /  AST  18  /  ALT  20  /  AlkPhos  72  10-08      LIVER FUNCTIONS - ( 08 Oct 2017 07:12 )  Alb: 3.0 g/dL / Pro: 7.9 gm/dL / ALK PHOS: 72 U/L / ALT: 20 U/L / AST: 18 U/L / GGT: x             Assessment and Plan:    MAXIMINO, CKD 3; hypervolemic hyponatremia; stable.  CT suggestive of underlying lung disease; interstitial; serologies negative so far.  Follow ACE level;   Continue po diuretic regimen.

## 2017-10-09 NOTE — PROGRESS NOTE ADULT - SUBJECTIVE AND OBJECTIVE BOX
Patient blood work shows paraproteinemia	    Vital Signs Last 24 Hrs  T(C): 36.5 (09 Oct 2017 11:04), Max: 37.1 (08 Oct 2017 23:19)  T(F): 97.7 (09 Oct 2017 11:04), Max: 98.8 (08 Oct 2017 23:19)  HR: 63 (09 Oct 2017 11:04) (63 - 68)  BP: 120/52 (09 Oct 2017 11:04) (120/52 - 139/56)  BP(mean): --  RR: 18 (09 Oct 2017 11:04) (16 - 18)  SpO2: 98% (09 Oct 2017 11:04) (98% - 100%)    PHYSICAL EXAM:    general - AAO x 3  HEENT - No Icterus  CVS - RRR  RS - AE B/L  Abd - soft, NT  Ext - Pulses +        LABS:                        10.3   8.9   )-----------( 216      ( 08 Oct 2017 07:12 )             32.4     10-08    127<L>  |  90<L>  |  53<H>  ----------------------------<  89  4.2   |  25  |  2.21<H>    Ca    9.5      08 Oct 2017 07:12    TPro  7.9  /  Alb  3.0<L>  /  TBili  0.3  /  DBili  x   /  AST  18  /  ALT  20  /  AlkPhos  72  10-08          Culture - Urine (collected 04 Oct 2017 10:09)  Source: .Urine Clean Catch (Midstream)  Final Report (05 Oct 2017 14:43):    Culture grew 3 or more types of organisms which indicate    collection contamination; consider recollection only if clinically    indicated.        RADIOLOGY & ADDITIONAL STUDIES:

## 2017-10-09 NOTE — DIETITIAN INITIAL EVALUATION ADULT. - PERTINENT MEDS FT
Heparin, Brilinta, Ecotrin, Azopt, Symbicort, Miralax, Senna, Bumex, Albuterol, Zyloprim, Humalog ss, Toprol XL, Lantus 14units am, Ranexa

## 2017-10-09 NOTE — DIETITIAN INITIAL EVALUATION ADULT. - OTHER INFO
Pt seen for LOS.  Pt lives home alone & receives SNAP 1 meal daily & HHA 6 days week to help with cooking. Pt's daughter does food shopping. Pt with poor po intake <50% meals x 2 days & presents with generalized weakness & currently c/o abdominal discomfort. Last BM x 1(10/7). Pt with Rt eye blindness manages DM type 2 with Lantus 14units am & Novolog ss ac meals & SMBG 1-3 x day.  Pt with 1.2kg wt loss x 3 day due to decreased edema & decreased po intaqke Pt seen for LOS.  Pt lives home alone & receives SNAP 1 meal daily & HHA 6 days week to help with cooking. Pt's daughter does food shopping. Pt with poor po intake <50% meals x 2 days & presents with generalized weakness & currently c/o abdominal discomfort "feeling abdominal gas" Last BM x 1(10/7). Pt with Rt eye blindness manages DM type 2 with Insulin. Pt administers independently Lantus 14units am & Novolog ss ac meals & SMBG 1-3 x day.  Pt with 1.2kg wt loss x 3 day due to decreased edema & decreased po intaqke

## 2017-10-09 NOTE — DIETITIAN INITIAL EVALUATION ADULT. - PERTINENT LABORATORY DATA
10-08 Na 127 mmol/L<L> Glu 89 mg/dL K+ 4.2 mmol/L Cr  2.21 mg/dL<H> BUN 53 mg/dL<H> Phos n/a   Alb 3.0 g/dL<L> PAB n/a   Hgb 10.3 g/dL<L> Hct 32.4 %<L>

## 2017-10-10 ENCOUNTER — TRANSCRIPTION ENCOUNTER (OUTPATIENT)
Age: 82
End: 2017-10-10

## 2017-10-10 LAB
ANION GAP SERPL CALC-SCNC: 13 MMOL/L — SIGNIFICANT CHANGE UP (ref 5–17)
BUN SERPL-MCNC: 50 MG/DL — HIGH (ref 7–23)
CALCIUM SERPL-MCNC: 9.3 MG/DL — SIGNIFICANT CHANGE UP (ref 8.5–10.1)
CHLORIDE SERPL-SCNC: 87 MMOL/L — LOW (ref 96–108)
CO2 SERPL-SCNC: 24 MMOL/L — SIGNIFICANT CHANGE UP (ref 22–31)
CREAT SERPL-MCNC: 2.51 MG/DL — HIGH (ref 0.5–1.3)
GLUCOSE BLDC GLUCOMTR-MCNC: 183 MG/DL — HIGH (ref 70–99)
GLUCOSE BLDC GLUCOMTR-MCNC: 315 MG/DL — HIGH (ref 70–99)
GLUCOSE BLDC GLUCOMTR-MCNC: 67 MG/DL — LOW (ref 70–99)
GLUCOSE SERPL-MCNC: 217 MG/DL — HIGH (ref 70–99)
NT-PROBNP SERPL-SCNC: HIGH PG/ML (ref 0–450)
OSMOLALITY SERPL: 283 MOS/KG — SIGNIFICANT CHANGE UP (ref 275–300)
OSMOLALITY UR: 261 MOS/KG — SIGNIFICANT CHANGE UP (ref 50–1200)
POTASSIUM SERPL-MCNC: 4.9 MMOL/L — SIGNIFICANT CHANGE UP (ref 3.5–5.3)
POTASSIUM SERPL-SCNC: 4.9 MMOL/L — SIGNIFICANT CHANGE UP (ref 3.5–5.3)
SODIUM SERPL-SCNC: 124 MMOL/L — LOW (ref 135–145)
SODIUM UR-SCNC: 41 MMOL/L — SIGNIFICANT CHANGE UP

## 2017-10-10 RX ORDER — TOLVAPTAN 15 MG/1
15 TABLET ORAL ONCE
Qty: 0 | Refills: 0 | Status: COMPLETED | OUTPATIENT
Start: 2017-10-10 | End: 2017-10-11

## 2017-10-10 RX ADMIN — BRIMONIDINE TARTRATE, TIMOLOL MALEATE 1 DROP(S): 2; 5 SOLUTION/ DROPS OPHTHALMIC at 07:53

## 2017-10-10 RX ADMIN — BRINZOLAMIDE 1 DROP(S): 10 SUSPENSION/ DROPS OPHTHALMIC at 07:52

## 2017-10-10 RX ADMIN — HEPARIN SODIUM 5000 UNIT(S): 5000 INJECTION INTRAVENOUS; SUBCUTANEOUS at 06:01

## 2017-10-10 RX ADMIN — Medication 50 MILLIGRAM(S): at 22:41

## 2017-10-10 RX ADMIN — POLYETHYLENE GLYCOL 3350 17 GRAM(S): 17 POWDER, FOR SOLUTION ORAL at 17:16

## 2017-10-10 RX ADMIN — Medication 4: at 07:52

## 2017-10-10 RX ADMIN — Medication 200 MILLIGRAM(S): at 11:27

## 2017-10-10 RX ADMIN — Medication 8: at 16:02

## 2017-10-10 RX ADMIN — Medication 81 MILLIGRAM(S): at 11:27

## 2017-10-10 RX ADMIN — RANOLAZINE 500 MILLIGRAM(S): 500 TABLET, FILM COATED, EXTENDED RELEASE ORAL at 17:16

## 2017-10-10 RX ADMIN — RANOLAZINE 500 MILLIGRAM(S): 500 TABLET, FILM COATED, EXTENDED RELEASE ORAL at 06:04

## 2017-10-10 RX ADMIN — Medication 4: at 11:25

## 2017-10-10 RX ADMIN — POLYETHYLENE GLYCOL 3350 17 GRAM(S): 17 POWDER, FOR SOLUTION ORAL at 06:04

## 2017-10-10 RX ADMIN — Medication 325 MILLIGRAM(S): at 17:16

## 2017-10-10 RX ADMIN — Medication 100 MILLIGRAM(S): at 11:27

## 2017-10-10 RX ADMIN — BUMETANIDE 2 MILLIGRAM(S): 0.25 INJECTION INTRAMUSCULAR; INTRAVENOUS at 06:02

## 2017-10-10 RX ADMIN — Medication 50 MILLIGRAM(S): at 06:02

## 2017-10-10 RX ADMIN — Medication 50 MILLIGRAM(S): at 13:56

## 2017-10-10 RX ADMIN — BRINZOLAMIDE 1 DROP(S): 10 SUSPENSION/ DROPS OPHTHALMIC at 17:16

## 2017-10-10 RX ADMIN — BRIMONIDINE TARTRATE, TIMOLOL MALEATE 1 DROP(S): 2; 5 SOLUTION/ DROPS OPHTHALMIC at 17:19

## 2017-10-10 RX ADMIN — SENNA PLUS 2 TABLET(S): 8.6 TABLET ORAL at 22:41

## 2017-10-10 RX ADMIN — Medication 325 MILLIGRAM(S): at 06:01

## 2017-10-10 RX ADMIN — Medication 25 MILLIGRAM(S): at 06:03

## 2017-10-10 RX ADMIN — BUDESONIDE AND FORMOTEROL FUMARATE DIHYDRATE 2 PUFF(S): 160; 4.5 AEROSOL RESPIRATORY (INHALATION) at 06:03

## 2017-10-10 RX ADMIN — TICAGRELOR 90 MILLIGRAM(S): 90 TABLET ORAL at 17:20

## 2017-10-10 RX ADMIN — TICAGRELOR 90 MILLIGRAM(S): 90 TABLET ORAL at 06:05

## 2017-10-10 RX ADMIN — INSULIN GLARGINE 14 UNIT(S): 100 INJECTION, SOLUTION SUBCUTANEOUS at 07:52

## 2017-10-10 NOTE — DISCHARGE NOTE ADULT - HOSPITAL COURSE
pt Dx c CHF hypoNA. Suspicion for COPD. As per pulmonologist changes on CT - 2 to CHF. Tolvaptan was introduced by nepphrologist, recommended d/c home and f/u c BMP Pt Dx c CHF hypoNA. Suspicious for COPD. As per pulmonologist changes on CT - 2 to CHF. Tolvaptan was introduced by nepphrologist, recommended d/c home and f/u c BMP. Pt. and daughter addiment to leave home today. understands the risk of leaving with the low sodium level. D/w Dr. Ca in length pt and daughter stated they will call office if there is any dizziness, unsteadiness, nausea and or SOB. Verbalizes the understanding and stated will f/u within this week or if any symptoms occur.

## 2017-10-10 NOTE — DISCHARGE NOTE ADULT - MEDICATION SUMMARY - MEDICATIONS TO STOP TAKING
I will STOP taking the medications listed below when I get home from the hospital:    metOLazone 10 mg oral tablet  -- 1 tab(s) by mouth once a day    Lasix 40 mg oral tablet  -- 60 milligram(s) by mouth once a day

## 2017-10-10 NOTE — PROGRESS NOTE ADULT - SUBJECTIVE AND OBJECTIVE BOX
REPORTS NO DYSPNEA  RUBALCAVA  IN PLACE  JVP WNL  CLEAR LUNGS  REGULAR RATE_&_RHYTHM;NORMAL_S1&S2_NO_SIGNIFICANT_MURMURS,RUBS,OR_GALLOPS.  EDEMA    CARDIOMYOPATHY  chronic kidney disease  HYPONATREMIA  ACUTE ON CHRONIC SYSTOLIC HEART FAILURE SECONDARY TO: CARDIO/RENAL DISEASE    CONTINUING PRESENT CARE    DAYO COTTON MD, FACC

## 2017-10-10 NOTE — DISCHARGE NOTE ADULT - CARE PROVIDERS DIRECT ADDRESSES
,DirectAddress_Unknown,[email or web address],DirectAddress_Unknown

## 2017-10-10 NOTE — DISCHARGE NOTE ADULT - CARE PROVIDER_API CALL
Stew Walker (), Family Medicine  [address]  [address] [place] [address]  Phone: [phone]  Fax: [phone]    Sunny Torres), Internal Medicine; Nephrology  [address] [facility]  [address] [place] [id number]  Phone: [phone]  Fax: [phone]    Marvin Mendoza), Cardiology; Internal Medicine; Nuclear Cardiology  [address] [place] [address]  Phone: [phone]  Fax: [phone]

## 2017-10-10 NOTE — DISCHARGE NOTE ADULT - SECONDARY DIAGNOSIS.
Hyponatremia CAD S/P percutaneous coronary angioplasty PVD (peripheral vascular disease) Diabetes mellitus due to underlying condition with blindness and proliferative retinopathy S/P foot surgery, left Essential hypertension

## 2017-10-10 NOTE — PROGRESS NOTE ADULT - SUBJECTIVE AND OBJECTIVE BOX
Patient seen in follow up for MAXIMINO, CKD 3 hyponatremia; eating poorly, and drinking a lot of fluid. Poor appetite.    MEDICATIONS  (STANDING):  allopurinol 100 milliGRAM(s) Oral daily  aspirin enteric coated 81 milliGRAM(s) Oral daily  brimonidine 0.2%/ timolol 0.5% Ophthalmic Solution 1 Drop(s) Both EYES every 12 hours  brinzolamide 1% Ophthalmic Suspension 1 Drop(s) Both EYES two times a day  buDESOnide 160 MICROgram(s)/formoterol 4.5 MICROgram(s) Inhaler 2 Puff(s) Inhalation two times a day  buMETAnide 2 milliGRAM(s) Oral daily  dextrose 5%. 1000 milliLiter(s) (50 mL/Hr) IV Continuous <Continuous>  dextrose 50% Injectable 12.5 Gram(s) IV Push once  dextrose 50% Injectable 25 Gram(s) IV Push once  dextrose 50% Injectable 25 Gram(s) IV Push once  docusate sodium 200 milliGRAM(s) Oral daily  heparin  Injectable 5000 Unit(s) SubCutaneous every 12 hours  hydrALAZINE 50 milliGRAM(s) Oral every 8 hours  insulin glargine Injectable (LANTUS) 14 Unit(s) SubCutaneous every morning  insulin lispro (HumaLOG) corrective regimen sliding scale   SubCutaneous three times a day before meals  metoprolol succinate ER 25 milliGRAM(s) Oral daily  polyethylene glycol 3350 17 Gram(s) Oral two times a day  ranolazine 500 milliGRAM(s) Oral two times a day  senna 2 Tablet(s) Oral at bedtime  sodium bicarbonate 325 milliGRAM(s) Oral two times a day  ticagrelor 90 milliGRAM(s) Oral two times a day  tolvaptan 15 milliGRAM(s) Oral once    MEDICATIONS  (PRN):  ALBUTerol    0.083% 2.5 milliGRAM(s) Nebulizer every 8 hours PRN Shortness of Breath and/or Wheezing  dextrose Gel 1 Dose(s) Oral once PRN Blood Glucose LESS THAN 70 milliGRAM(s)/deciliter  glucagon  Injectable 1 milliGRAM(s) IntraMuscular once PRN Glucose LESS THAN 70 milligrams/deciliter    PHYSICAL EXAM:      T(C): 36.9 (10-10-17 @ 11:08), Max: 36.9 (10-10-17 @ 11:08)  HR: 60 (10-10-17 @ 11:08) (60 - 70)  BP: 119/48 (10-10-17 @ 11:08) (119/48 - 141/64)  RR: 18 (10-10-17 @ 11:08) (16 - 18)  SpO2: 98% (10-10-17 @ 11:08) (98% - 100%)  Wt(kg): --  Respiratory: clear anteriorly, decreased BS at bases  Cardiovascular: S1 S2  Gastrointestinal: soft NT ND +BS  Extremities:  1 +  edema R>L Right TMA; unchanged.                10-10    124<L>  |  87<L>  |  50<H>  ----------------------------<  217<H>  4.9   |  24  |  2.51<H>    Ca    9.3      10 Oct 2017 08:11            Assessment and Plan:    MAXIMINO, CKD 3; CRS; hypervolemic hyponatremia, elevated ADH state; suspected underlying primary lung disease; exacerbated with poor po intake; urine osms in 200's.  Tolvaptan; Bumex; FR;   Serologies pending;   Serum / Urine osm;  Will follow; if Na increasing trend no objection to d/c with close outpatient follow up.  D/C  annalee. Patient seen in follow up for MAXIMINO, CKD 3 hyponatremia; eating poorly, and drinking a lot of fluid. Poor appetite.    MEDICATIONS  (STANDING):  allopurinol 100 milliGRAM(s) Oral daily  aspirin enteric coated 81 milliGRAM(s) Oral daily  brimonidine 0.2%/ timolol 0.5% Ophthalmic Solution 1 Drop(s) Both EYES every 12 hours  brinzolamide 1% Ophthalmic Suspension 1 Drop(s) Both EYES two times a day  buDESOnide 160 MICROgram(s)/formoterol 4.5 MICROgram(s) Inhaler 2 Puff(s) Inhalation two times a day  buMETAnide 2 milliGRAM(s) Oral daily  dextrose 5%. 1000 milliLiter(s) (50 mL/Hr) IV Continuous <Continuous>  dextrose 50% Injectable 12.5 Gram(s) IV Push once  dextrose 50% Injectable 25 Gram(s) IV Push once  dextrose 50% Injectable 25 Gram(s) IV Push once  docusate sodium 200 milliGRAM(s) Oral daily  heparin  Injectable 5000 Unit(s) SubCutaneous every 12 hours  hydrALAZINE 50 milliGRAM(s) Oral every 8 hours  insulin glargine Injectable (LANTUS) 14 Unit(s) SubCutaneous every morning  insulin lispro (HumaLOG) corrective regimen sliding scale   SubCutaneous three times a day before meals  metoprolol succinate ER 25 milliGRAM(s) Oral daily  polyethylene glycol 3350 17 Gram(s) Oral two times a day  ranolazine 500 milliGRAM(s) Oral two times a day  senna 2 Tablet(s) Oral at bedtime  sodium bicarbonate 325 milliGRAM(s) Oral two times a day  ticagrelor 90 milliGRAM(s) Oral two times a day  tolvaptan 15 milliGRAM(s) Oral once    MEDICATIONS  (PRN):  ALBUTerol    0.083% 2.5 milliGRAM(s) Nebulizer every 8 hours PRN Shortness of Breath and/or Wheezing  dextrose Gel 1 Dose(s) Oral once PRN Blood Glucose LESS THAN 70 milliGRAM(s)/deciliter  glucagon  Injectable 1 milliGRAM(s) IntraMuscular once PRN Glucose LESS THAN 70 milligrams/deciliter    PHYSICAL EXAM:      T(C): 36.9 (10-10-17 @ 11:08), Max: 36.9 (10-10-17 @ 11:08)  HR: 60 (10-10-17 @ 11:08) (60 - 70)  BP: 119/48 (10-10-17 @ 11:08) (119/48 - 141/64)  RR: 18 (10-10-17 @ 11:08) (16 - 18)  SpO2: 98% (10-10-17 @ 11:08) (98% - 100%)  Wt(kg): --  Respiratory: clear anteriorly, decreased BS at bases  Cardiovascular: S1 S2  Gastrointestinal: soft NT ND +BS  Extremities:  1 +  edema R>L Right TMA; unchanged.                10-10    124<L>  |  87<L>  |  50<H>  ----------------------------<  217<H>  4.9   |  24  |  2.51<H>    Ca    9.3      10 Oct 2017 08:11            Assessment and Plan:    MAXIMINO, CKD 3; CRS; hypervolemic hyponatremia, elevated ADH state; suspected underlying primary lung disease; exacerbated with poor po intake; urine osms in 200's.  Tolvaptan; Bumex; FR;   Serologies pending;   Serum / Urine osm;  Will follow; if Na increasing trend no objection to d/c with close outpatient follow up.  Will need heme onc and pulm follow; noted paraprotein and chronic lung disease on CT scan.  D/C  annalee.

## 2017-10-10 NOTE — PROGRESS NOTE ADULT - SUBJECTIVE AND OBJECTIVE BOX
Patient sitting in chair    Vital Signs Last 24 Hrs  T(C): 36.4 (10 Oct 2017 05:20), Max: 36.8 (09 Oct 2017 23:24)  T(F): 97.5 (10 Oct 2017 05:20), Max: 98.2 (09 Oct 2017 23:24)  HR: 70 (10 Oct 2017 05:20) (63 - 70)  BP: 133/55 (10 Oct 2017 05:20) (120/52 - 141/64)  BP(mean): --  RR: 16 (10 Oct 2017 05:20) (16 - 18)  SpO2: 98% (10 Oct 2017 05:20) (98% - 100%)    PHYSICAL EXAM:    general - AAO x 3  HEENT - No Icterus  CVS - RRR  RS - AE B/L  Abd - soft, NT  Ext - Pulses +        LABS:    10-10    124<L>  |  87<L>  |  50<H>  ----------------------------<  217<H>  4.9   |  24  |  2.51<H>    Ca    9.3      10 Oct 2017 08:11            Culture - Urine (collected 04 Oct 2017 10:09)  Source: .Urine Clean Catch (Midstream)  Final Report (05 Oct 2017 14:43):    Culture grew 3 or more types of organisms which indicate    collection contamination; consider recollection only if clinically    indicated.        RADIOLOGY & ADDITIONAL STUDIES:

## 2017-10-10 NOTE — DISCHARGE NOTE ADULT - MEDICATION SUMMARY - MEDICATIONS TO TAKE
I will START or STAY ON the medications listed below when I get home from the hospital:    Aspirin Enteric Coated 81 mg oral delayed release tablet  -- 1 tab(s) by mouth once a day  -- Indication: For CAD    ramipril 1.25 mg oral capsule  -- 1 cap(s) by mouth once a day  -- Indication: For CHF    sodium bicarbonate 325 mg oral tablet  -- 1 tab(s) by mouth 2 times a day  -- Indication: For CKD    ranolazine 500 mg oral tablet, extended release  -- 1 tab(s) by mouth 2 times a day  -- Indication: For CAD    Lantus  -- 14 unit(s) subcutaneous once a day in AM  -- Indication: For DM    allopurinol 100 mg oral tablet  -- 1 tab(s) by mouth once a day  -- Indication: For gout    Brilinta (ticagrelor) 90 mg oral tablet  -- 1 tab(s) by mouth 2 times a day  -- Indication: For CAD    metoprolol succinate 25 mg oral tablet, extended release  -- 1 tab(s) by mouth once a day  -- Indication: For Htn    budesonide-formoterol 160 mcg-4.5 mcg/inh inhalation aerosol  -- 2 puff(s) inhaled 2 times a day   -- Indication: For COPD    bumetanide 2 mg oral tablet  -- 1 tab(s) by mouth once a day  -- Indication: For CHF hypoNA    senna oral tablet  -- 2 tab(s) by mouth once a day (at bedtime)  -- Indication: For constipation    docusate sodium 100 mg oral capsule  -- 2 cap(s) by mouth once a day  -- Indication: For constipation    Combigan 0.2%-0.5% ophthalmic solution  -- 1 drop(s) to each affected eye every 12 hours  -- Indication: For glaucoma    Azopt 1% ophthalmic suspension  -- to each affected eye 2 times a day  -- Indication: For glaucoma    hydrALAZINE 50 mg oral tablet  --  by mouth every 8 hours  -- Indication: For Htn    tolvaptan 15 mg oral tablet  -- 1 tab(s) by mouth once  -- Indication: For Hyponatremia

## 2017-10-10 NOTE — DISCHARGE NOTE ADULT - CARE PLAN
Principal Discharge DX:	Acute on chronic combined systolic and diastolic CHF, NYHA class 2  Goal:	wt control, BMP f/u  Instructions for follow-up, activity and diet:	low carbohydrates  Secondary Diagnosis:	Hyponatremia  Secondary Diagnosis:	CAD S/P percutaneous coronary angioplasty  Secondary Diagnosis:	PVD (peripheral vascular disease)  Secondary Diagnosis:	Diabetes mellitus due to underlying condition with blindness and proliferative retinopathy  Secondary Diagnosis:	S/P foot surgery, left  Secondary Diagnosis:	Essential hypertension Principal Discharge DX:	Acute on chronic combined systolic and diastolic CHF, NYHA class 2  Goal:	wt control, BMP f/u  Instructions for follow-up, activity and diet:	low carbohydrates  Secondary Diagnosis:	Hyponatremia  Goal:	resolving  Instructions for follow-up, activity and diet:	f/u with Dr. Walker office within this week to repeat labs  Secondary Diagnosis:	CAD S/P percutaneous coronary angioplasty  Secondary Diagnosis:	PVD (peripheral vascular disease)  Secondary Diagnosis:	Diabetes mellitus due to underlying condition with blindness and proliferative retinopathy  Secondary Diagnosis:	S/P foot surgery, left  Secondary Diagnosis:	Essential hypertension

## 2017-10-11 VITALS
DIASTOLIC BLOOD PRESSURE: 70 MMHG | TEMPERATURE: 98 F | SYSTOLIC BLOOD PRESSURE: 136 MMHG | RESPIRATION RATE: 18 BRPM | HEART RATE: 64 BPM | OXYGEN SATURATION: 98 %

## 2017-10-11 LAB
ACE SERPL-CCNC: 78 U/L — SIGNIFICANT CHANGE UP (ref 14–82)
ANION GAP SERPL CALC-SCNC: 10 MMOL/L — SIGNIFICANT CHANGE UP (ref 5–17)
BUN SERPL-MCNC: 55 MG/DL — HIGH (ref 7–23)
CALCIUM SERPL-MCNC: 9.3 MG/DL — SIGNIFICANT CHANGE UP (ref 8.5–10.1)
CHLORIDE SERPL-SCNC: 89 MMOL/L — LOW (ref 96–108)
CHOLEST SERPL-MCNC: 171 MG/DL — SIGNIFICANT CHANGE UP (ref 10–199)
CO2 SERPL-SCNC: 26 MMOL/L — SIGNIFICANT CHANGE UP (ref 22–31)
CREAT SERPL-MCNC: 2.6 MG/DL — HIGH (ref 0.5–1.3)
DSDNA AB FLD-ACNC: <0.2 AI — SIGNIFICANT CHANGE UP
ENA SCL70 AB SER-ACNC: <0.2 AI — SIGNIFICANT CHANGE UP
ENA SS-A AB FLD IA-ACNC: 0.2 AI — SIGNIFICANT CHANGE UP
GLUCOSE BLDC GLUCOMTR-MCNC: 179 MG/DL — HIGH (ref 70–99)
GLUCOSE BLDC GLUCOMTR-MCNC: 181 MG/DL — HIGH (ref 70–99)
GLUCOSE BLDC GLUCOMTR-MCNC: 206 MG/DL — HIGH (ref 70–99)
GLUCOSE BLDC GLUCOMTR-MCNC: 234 MG/DL — HIGH (ref 70–99)
GLUCOSE SERPL-MCNC: 159 MG/DL — HIGH (ref 70–99)
HDLC SERPL-MCNC: 61 MG/DL — SIGNIFICANT CHANGE UP (ref 40–125)
LIPID PNL WITH DIRECT LDL SERPL: 93 MG/DL — SIGNIFICANT CHANGE UP
POTASSIUM SERPL-MCNC: 5 MMOL/L — SIGNIFICANT CHANGE UP (ref 3.5–5.3)
POTASSIUM SERPL-SCNC: 5 MMOL/L — SIGNIFICANT CHANGE UP (ref 3.5–5.3)
SODIUM SERPL-SCNC: 125 MMOL/L — LOW (ref 135–145)
TOTAL CHOLESTEROL/HDL RATIO MEASUREMENT: 2.8 RATIO — LOW (ref 3.3–7.1)
TRIGL SERPL-MCNC: 86 MG/DL — SIGNIFICANT CHANGE UP (ref 10–149)

## 2017-10-11 RX ORDER — MAGNESIUM HYDROXIDE 400 MG/1
30 TABLET, CHEWABLE ORAL EVERY 8 HOURS
Qty: 0 | Refills: 0 | Status: DISCONTINUED | OUTPATIENT
Start: 2017-10-11 | End: 2017-10-11

## 2017-10-11 RX ORDER — SODIUM CHLORIDE 9 MG/ML
2 INJECTION INTRAMUSCULAR; INTRAVENOUS; SUBCUTANEOUS
Qty: 0 | Refills: 0 | Status: DISCONTINUED | OUTPATIENT
Start: 2017-10-11 | End: 2017-10-11

## 2017-10-11 RX ORDER — BUMETANIDE 0.25 MG/ML
1 INJECTION INTRAMUSCULAR; INTRAVENOUS
Qty: 30 | Refills: 2 | OUTPATIENT
Start: 2017-10-11

## 2017-10-11 RX ORDER — FUROSEMIDE 40 MG
60 TABLET ORAL
Qty: 0 | Refills: 0 | COMMUNITY

## 2017-10-11 RX ORDER — TOLVAPTAN 15 MG/1
15 TABLET ORAL ONCE
Qty: 0 | Refills: 0 | Status: DISCONTINUED | OUTPATIENT
Start: 2017-10-11 | End: 2017-10-11

## 2017-10-11 RX ORDER — DOCUSATE SODIUM 100 MG
2 CAPSULE ORAL
Qty: 0 | Refills: 0 | COMMUNITY
Start: 2017-10-11

## 2017-10-11 RX ORDER — BUDESONIDE AND FORMOTEROL FUMARATE DIHYDRATE 160; 4.5 UG/1; UG/1
2 AEROSOL RESPIRATORY (INHALATION)
Qty: 1 | Refills: 0 | OUTPATIENT
Start: 2017-10-11

## 2017-10-11 RX ORDER — TOLVAPTAN 15 MG/1
1 TABLET ORAL
Qty: 30 | Refills: 1 | OUTPATIENT
Start: 2017-10-11

## 2017-10-11 RX ORDER — ALLOPURINOL 300 MG
1 TABLET ORAL
Qty: 0 | Refills: 0 | COMMUNITY
Start: 2017-10-11

## 2017-10-11 RX ORDER — SENNA PLUS 8.6 MG/1
2 TABLET ORAL
Qty: 0 | Refills: 0 | COMMUNITY
Start: 2017-10-11

## 2017-10-11 RX ORDER — BUMETANIDE 0.25 MG/ML
1 INJECTION INTRAMUSCULAR; INTRAVENOUS DAILY
Qty: 0 | Refills: 0 | Status: DISCONTINUED | OUTPATIENT
Start: 2017-10-12 | End: 2017-10-11

## 2017-10-11 RX ORDER — AMLODIPINE BESYLATE 2.5 MG/1
1 TABLET ORAL
Qty: 0 | Refills: 0 | COMMUNITY

## 2017-10-11 RX ORDER — ONDANSETRON 8 MG/1
4 TABLET, FILM COATED ORAL ONCE
Qty: 0 | Refills: 0 | Status: COMPLETED | OUTPATIENT
Start: 2017-10-11 | End: 2017-10-11

## 2017-10-11 RX ADMIN — Medication 100 MILLIGRAM(S): at 12:00

## 2017-10-11 RX ADMIN — Medication 50 MILLIGRAM(S): at 06:32

## 2017-10-11 RX ADMIN — Medication 4: at 15:59

## 2017-10-11 RX ADMIN — SODIUM CHLORIDE 2 GRAM(S): 9 INJECTION INTRAMUSCULAR; INTRAVENOUS; SUBCUTANEOUS at 14:28

## 2017-10-11 RX ADMIN — Medication 325 MILLIGRAM(S): at 06:31

## 2017-10-11 RX ADMIN — SODIUM CHLORIDE 2 GRAM(S): 9 INJECTION INTRAMUSCULAR; INTRAVENOUS; SUBCUTANEOUS at 16:00

## 2017-10-11 RX ADMIN — MAGNESIUM HYDROXIDE 30 MILLILITER(S): 400 TABLET, CHEWABLE ORAL at 12:44

## 2017-10-11 RX ADMIN — Medication 2: at 11:47

## 2017-10-11 RX ADMIN — BRIMONIDINE TARTRATE, TIMOLOL MALEATE 1 DROP(S): 2; 5 SOLUTION/ DROPS OPHTHALMIC at 06:30

## 2017-10-11 RX ADMIN — Medication 50 MILLIGRAM(S): at 14:28

## 2017-10-11 RX ADMIN — Medication 81 MILLIGRAM(S): at 12:00

## 2017-10-11 RX ADMIN — Medication 4: at 08:52

## 2017-10-11 RX ADMIN — Medication 25 MILLIGRAM(S): at 06:32

## 2017-10-11 RX ADMIN — BUMETANIDE 2 MILLIGRAM(S): 0.25 INJECTION INTRAMUSCULAR; INTRAVENOUS at 06:32

## 2017-10-11 RX ADMIN — POLYETHYLENE GLYCOL 3350 17 GRAM(S): 17 POWDER, FOR SOLUTION ORAL at 06:37

## 2017-10-11 RX ADMIN — Medication 200 MILLIGRAM(S): at 12:00

## 2017-10-11 RX ADMIN — TICAGRELOR 90 MILLIGRAM(S): 90 TABLET ORAL at 06:32

## 2017-10-11 RX ADMIN — INSULIN GLARGINE 14 UNIT(S): 100 INJECTION, SOLUTION SUBCUTANEOUS at 09:52

## 2017-10-11 RX ADMIN — TOLVAPTAN 15 MILLIGRAM(S): 15 TABLET ORAL at 12:43

## 2017-10-11 NOTE — PROGRESS NOTE ADULT - SUBJECTIVE AND OBJECTIVE BOX
INTERVAL History:    Allergies    codeine (Other)    Intolerances        MEDICATIONS  (STANDING):  allopurinol 100 milliGRAM(s) Oral daily  aspirin enteric coated 81 milliGRAM(s) Oral daily  brimonidine 0.2%/ timolol 0.5% Ophthalmic Solution 1 Drop(s) Both EYES every 12 hours  brinzolamide 1% Ophthalmic Suspension 1 Drop(s) Both EYES two times a day  buDESOnide 160 MICROgram(s)/formoterol 4.5 MICROgram(s) Inhaler 2 Puff(s) Inhalation two times a day  buMETAnide 2 milliGRAM(s) Oral daily  dextrose 5%. 1000 milliLiter(s) (50 mL/Hr) IV Continuous <Continuous>  dextrose 50% Injectable 12.5 Gram(s) IV Push once  dextrose 50% Injectable 25 Gram(s) IV Push once  dextrose 50% Injectable 25 Gram(s) IV Push once  docusate sodium 200 milliGRAM(s) Oral daily  heparin  Injectable 5000 Unit(s) SubCutaneous every 12 hours  hydrALAZINE 50 milliGRAM(s) Oral every 8 hours  insulin glargine Injectable (LANTUS) 14 Unit(s) SubCutaneous every morning  insulin lispro (HumaLOG) corrective regimen sliding scale   SubCutaneous three times a day before meals  metoprolol succinate ER 25 milliGRAM(s) Oral daily  polyethylene glycol 3350 17 Gram(s) Oral two times a day  ranolazine 500 milliGRAM(s) Oral two times a day  senna 2 Tablet(s) Oral at bedtime  sodium bicarbonate 325 milliGRAM(s) Oral two times a day  ticagrelor 90 milliGRAM(s) Oral two times a day  tolvaptan 15 milliGRAM(s) Oral once    MEDICATIONS  (PRN):  ALBUTerol    0.083% 2.5 milliGRAM(s) Nebulizer every 8 hours PRN Shortness of Breath and/or Wheezing  dextrose Gel 1 Dose(s) Oral once PRN Blood Glucose LESS THAN 70 milliGRAM(s)/deciliter  glucagon  Injectable 1 milliGRAM(s) IntraMuscular once PRN Glucose LESS THAN 70 milligrams/deciliter      Vital Signs Last 24 Hrs  T(C): 36.9 (11 Oct 2017 06:03), Max: 36.9 (10 Oct 2017 11:08)  T(F): 98.5 (11 Oct 2017 06:03), Max: 98.5 (11 Oct 2017 06:03)  HR: 70 (11 Oct 2017 06:03) (60 - 70)  BP: 117/54 (11 Oct 2017 06:03) (117/54 - 154/71)  BP(mean): --  RR: 16 (11 Oct 2017 06:03) (16 - 18)  SpO2: 95% (11 Oct 2017 06:03) (95% - 100%)    PHYSICAL EXAM:      EYES: EOMI, PERRLA, conjunctiva and sclera clear  NECK: Supple, No JVD, Normal thyroid  CHEST/LUNG: Clear to percussion bilaterally; No rales, rhonchi,   HEART: Regular rate and rhythm;   ABDOMEN: Soft, Nontender.            LABS:    10-11    125<L>  |  89<L>  |  55<H>  ----------------------------<  159<H>  5.0   |  26  |  2.60<H>    Ca    9.3      11 Oct 2017 07:12              RADIOLOGY & ADDITIONAL STUDIES:    PATHOLOGY:

## 2017-10-11 NOTE — PROGRESS NOTE ADULT - PROVIDER SPECIALTY LIST ADULT
Cardiology
Family Medicine
Heme/Onc
Nephrology
Family Medicine
Nephrology

## 2017-10-11 NOTE — PROGRESS NOTE ADULT - SUBJECTIVE AND OBJECTIVE BOX
Patient seen in follow up for MAXIMINO, CKD3; hyponatremia; feels ok; not eating well.  Na 125; Tolvaptan not given yesterday;     MEDICATIONS  (STANDING):  allopurinol 100 milliGRAM(s) Oral daily  aspirin enteric coated 81 milliGRAM(s) Oral daily  brimonidine 0.2%/ timolol 0.5% Ophthalmic Solution 1 Drop(s) Both EYES every 12 hours  brinzolamide 1% Ophthalmic Suspension 1 Drop(s) Both EYES two times a day  buDESOnide 160 MICROgram(s)/formoterol 4.5 MICROgram(s) Inhaler 2 Puff(s) Inhalation two times a day  buMETAnide 2 milliGRAM(s) Oral daily  dextrose 5%. 1000 milliLiter(s) (50 mL/Hr) IV Continuous <Continuous>  dextrose 50% Injectable 12.5 Gram(s) IV Push once  dextrose 50% Injectable 25 Gram(s) IV Push once  dextrose 50% Injectable 25 Gram(s) IV Push once  docusate sodium 200 milliGRAM(s) Oral daily  heparin  Injectable 5000 Unit(s) SubCutaneous every 12 hours  hydrALAZINE 50 milliGRAM(s) Oral every 8 hours  insulin glargine Injectable (LANTUS) 14 Unit(s) SubCutaneous every morning  insulin lispro (HumaLOG) corrective regimen sliding scale   SubCutaneous three times a day before meals  magnesium hydroxide Suspension 30 milliLiter(s) Oral every 8 hours  metoprolol succinate ER 25 milliGRAM(s) Oral daily  polyethylene glycol 3350 17 Gram(s) Oral two times a day  ranolazine 500 milliGRAM(s) Oral two times a day  senna 2 Tablet(s) Oral at bedtime  sodium bicarbonate 325 milliGRAM(s) Oral two times a day  sodium chloride 2 Gram(s) Oral three times a day before meals  ticagrelor 90 milliGRAM(s) Oral two times a day  tolvaptan 15 milliGRAM(s) Oral once    MEDICATIONS  (PRN):  ALBUTerol    0.083% 2.5 milliGRAM(s) Nebulizer every 8 hours PRN Shortness of Breath and/or Wheezing  dextrose Gel 1 Dose(s) Oral once PRN Blood Glucose LESS THAN 70 milliGRAM(s)/deciliter  glucagon  Injectable 1 milliGRAM(s) IntraMuscular once PRN Glucose LESS THAN 70 milligrams/deciliter    PHYSICAL EXAM:      T(C): 36.7 (10-11-17 @ 12:40), Max: 36.9 (10-11-17 @ 06:03)  HR: 65 (10-11-17 @ 12:40) (65 - 70)  BP: 138/65 (10-11-17 @ 12:40) (117/54 - 154/71)  RR: 16 (10-11-17 @ 12:40) (16 - 18)  SpO2: 99% (10-11-17 @ 12:40) (95% - 100%)  Wt(kg): --  Respiratory: clear anteriorly, decreased BS at bases  Cardiovascular: S1 S2  Gastrointestinal: soft NT ND +BS  Extremities:   R. 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 Patient seen in follow up for MAXIMINO, CKD3; hyponatremia; feels ok; not eating well.  Na 125; Tolvaptan not given yesterday; hem onc and pul evaluations noted.    MEDICATIONS  (STANDING):  allopurinol 100 milliGRAM(s) Oral daily  aspirin enteric coated 81 milliGRAM(s) Oral daily  brimonidine 0.2%/ timolol 0.5% Ophthalmic Solution 1 Drop(s) Both EYES every 12 hours  brinzolamide 1% Ophthalmic Suspension 1 Drop(s) Both EYES two times a day  buDESOnide 160 MICROgram(s)/formoterol 4.5 MICROgram(s) Inhaler 2 Puff(s) Inhalation two times a day  buMETAnide 2 milliGRAM(s) Oral daily  dextrose 5%. 1000 milliLiter(s) (50 mL/Hr) IV Continuous <Continuous>  dextrose 50% Injectable 12.5 Gram(s) IV Push once  dextrose 50% Injectable 25 Gram(s) IV Push once  dextrose 50% Injectable 25 Gram(s) IV Push once  docusate sodium 200 milliGRAM(s) Oral daily  heparin  Injectable 5000 Unit(s) SubCutaneous every 12 hours  hydrALAZINE 50 milliGRAM(s) Oral every 8 hours  insulin glargine Injectable (LANTUS) 14 Unit(s) SubCutaneous every morning  insulin lispro (HumaLOG) corrective regimen sliding scale   SubCutaneous three times a day before meals  magnesium hydroxide Suspension 30 milliLiter(s) Oral every 8 hours  metoprolol succinate ER 25 milliGRAM(s) Oral daily  polyethylene glycol 3350 17 Gram(s) Oral two times a day  ranolazine 500 milliGRAM(s) Oral two times a day  senna 2 Tablet(s) Oral at bedtime  sodium bicarbonate 325 milliGRAM(s) Oral two times a day  sodium chloride 2 Gram(s) Oral three times a day before meals  ticagrelor 90 milliGRAM(s) Oral two times a day  tolvaptan 15 milliGRAM(s) Oral once    MEDICATIONS  (PRN):  ALBUTerol    0.083% 2.5 milliGRAM(s) Nebulizer every 8 hours PRN Shortness of Breath and/or Wheezing  dextrose Gel 1 Dose(s) Oral once PRN Blood Glucose LESS THAN 70 milliGRAM(s)/deciliter  glucagon  Injectable 1 milliGRAM(s) IntraMuscular once PRN Glucose LESS THAN 70 milligrams/deciliter    PHYSICAL EXAM:      T(C): 36.7 (10-11-17 @ 12:40), Max: 36.9 (10-11-17 @ 06:03)  HR: 65 (10-11-17 @ 12:40) (65 - 70)  BP: 138/65 (10-11-17 @ 12:40) (117/54 - 154/71)  RR: 16 (10-11-17 @ 12:40) (16 - 18)  SpO2: 99% (10-11-17 @ 12:40) (95% - 100%)  Wt(kg): --  Respiratory: clear anteriorly, decreased BS at bases  Cardiovascular: S1 S2  Gastrointestinal: soft NT ND +BS  Extremities:   R. 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

## 2017-10-11 NOTE — PROGRESS NOTE ADULT - PROBLEM SELECTOR PLAN 1
CBC
CBC
- d/w patient SPEP, S. Ifex results and possibility for plasma cell disorder, patient refusing BM Biopsy , patient understood chance of missing blood disorder, and continues to refuse  - watch blood counts
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

## 2017-10-13 DIAGNOSIS — D72.819 DECREASED WHITE BLOOD CELL COUNT, UNSPECIFIED: ICD-10-CM

## 2017-10-13 DIAGNOSIS — D64.9 ANEMIA, UNSPECIFIED: ICD-10-CM

## 2017-10-13 DIAGNOSIS — N39.0 URINARY TRACT INFECTION, SITE NOT SPECIFIED: ICD-10-CM

## 2017-10-13 DIAGNOSIS — Z28.21 IMMUNIZATION NOT CARRIED OUT BECAUSE OF PATIENT REFUSAL: ICD-10-CM

## 2017-10-13 DIAGNOSIS — E87.70 FLUID OVERLOAD, UNSPECIFIED: ICD-10-CM

## 2017-10-13 DIAGNOSIS — J44.9 CHRONIC OBSTRUCTIVE PULMONARY DISEASE, UNSPECIFIED: ICD-10-CM

## 2017-10-13 DIAGNOSIS — R74.8 ABNORMAL LEVELS OF OTHER SERUM ENZYMES: ICD-10-CM

## 2017-10-13 DIAGNOSIS — I50.23 ACUTE ON CHRONIC SYSTOLIC (CONGESTIVE) HEART FAILURE: ICD-10-CM

## 2017-10-13 DIAGNOSIS — E11.319 TYPE 2 DIABETES MELLITUS WITH UNSPECIFIED DIABETIC RETINOPATHY WITHOUT MACULAR EDEMA: ICD-10-CM

## 2017-10-13 DIAGNOSIS — I13.0 HYPERTENSIVE HEART AND CHRONIC KIDNEY DISEASE WITH HEART FAILURE AND STAGE 1 THROUGH STAGE 4 CHRONIC KIDNEY DISEASE, OR UNSPECIFIED CHRONIC KIDNEY DISEASE: ICD-10-CM

## 2017-10-13 DIAGNOSIS — E87.1 HYPO-OSMOLALITY AND HYPONATREMIA: ICD-10-CM

## 2017-10-13 DIAGNOSIS — E22.2 SYNDROME OF INAPPROPRIATE SECRETION OF ANTIDIURETIC HORMONE: ICD-10-CM

## 2017-10-13 DIAGNOSIS — E11.22 TYPE 2 DIABETES MELLITUS WITH DIABETIC CHRONIC KIDNEY DISEASE: ICD-10-CM

## 2017-10-13 DIAGNOSIS — I25.5 ISCHEMIC CARDIOMYOPATHY: ICD-10-CM

## 2017-10-13 DIAGNOSIS — D70.9 NEUTROPENIA, UNSPECIFIED: ICD-10-CM

## 2017-10-13 DIAGNOSIS — Z53.29 PROCEDURE AND TREATMENT NOT CARRIED OUT BECAUSE OF PATIENT'S DECISION FOR OTHER REASONS: ICD-10-CM

## 2017-10-13 DIAGNOSIS — Z79.82 LONG TERM (CURRENT) USE OF ASPIRIN: ICD-10-CM

## 2017-10-13 DIAGNOSIS — Z88.5 ALLERGY STATUS TO NARCOTIC AGENT: ICD-10-CM

## 2017-10-13 DIAGNOSIS — N18.4 CHRONIC KIDNEY DISEASE, STAGE 4 (SEVERE): ICD-10-CM

## 2017-10-13 DIAGNOSIS — E11.51 TYPE 2 DIABETES MELLITUS WITH DIABETIC PERIPHERAL ANGIOPATHY WITHOUT GANGRENE: ICD-10-CM

## 2017-10-13 DIAGNOSIS — N17.9 ACUTE KIDNEY FAILURE, UNSPECIFIED: ICD-10-CM

## 2017-10-13 DIAGNOSIS — K59.00 CONSTIPATION, UNSPECIFIED: ICD-10-CM

## 2017-10-13 DIAGNOSIS — D89.2 HYPERGAMMAGLOBULINEMIA, UNSPECIFIED: ICD-10-CM

## 2017-10-13 DIAGNOSIS — I25.10 ATHEROSCLEROTIC HEART DISEASE OF NATIVE CORONARY ARTERY WITHOUT ANGINA PECTORIS: ICD-10-CM

## 2017-10-13 DIAGNOSIS — Z79.4 LONG TERM (CURRENT) USE OF INSULIN: ICD-10-CM

## 2017-10-17 LAB — GLUCOSE BLDC GLUCOMTR-MCNC: 229 MG/DL — HIGH (ref 70–99)

## 2017-11-28 ENCOUNTER — APPOINTMENT (OUTPATIENT)
Dept: ULTRASOUND IMAGING | Facility: HOSPITAL | Age: 82
End: 2017-11-28

## 2017-11-28 ENCOUNTER — OUTPATIENT (OUTPATIENT)
Dept: OUTPATIENT SERVICES | Facility: HOSPITAL | Age: 82
LOS: 1 days | Discharge: HOME | End: 2017-11-28
Payer: MEDICARE

## 2017-11-28 DIAGNOSIS — R79.9 ABNORMAL FINDING OF BLOOD CHEMISTRY, UNSPECIFIED: ICD-10-CM

## 2017-11-28 PROCEDURE — 76775 US EXAM ABDO BACK WALL LIM: CPT | Mod: 26

## 2017-11-28 PROCEDURE — 76770 US EXAM ABDO BACK WALL COMP: CPT | Mod: 26

## 2018-03-19 ENCOUNTER — INPATIENT (INPATIENT)
Facility: HOSPITAL | Age: 83
LOS: 14 days | End: 2018-04-03
Attending: FAMILY MEDICINE | Admitting: FAMILY MEDICINE
Payer: MEDICARE

## 2018-03-19 PROCEDURE — 99285 EMERGENCY DEPT VISIT HI MDM: CPT | Mod: 25

## 2018-03-20 VITALS
TEMPERATURE: 98 F | HEART RATE: 59 BPM | HEIGHT: 55 IN | OXYGEN SATURATION: 99 % | WEIGHT: 132.06 LBS | RESPIRATION RATE: 20 BRPM | DIASTOLIC BLOOD PRESSURE: 102 MMHG | SYSTOLIC BLOOD PRESSURE: 141 MMHG

## 2018-03-20 LAB
ALBUMIN SERPL ELPH-MCNC: 3.2 G/DL — LOW (ref 3.3–5)
ALP SERPL-CCNC: 107 U/L — SIGNIFICANT CHANGE UP (ref 40–120)
ALT FLD-CCNC: 62 U/L — SIGNIFICANT CHANGE UP (ref 12–78)
ANION GAP SERPL CALC-SCNC: 9 MMOL/L — SIGNIFICANT CHANGE UP (ref 5–17)
APTT BLD: 31 SEC — SIGNIFICANT CHANGE UP (ref 27.5–37.4)
AST SERPL-CCNC: 33 U/L — SIGNIFICANT CHANGE UP (ref 15–37)
BASOPHILS NFR BLD AUTO: 0 % — SIGNIFICANT CHANGE UP (ref 0–2)
BILIRUB SERPL-MCNC: 0.3 MG/DL — SIGNIFICANT CHANGE UP (ref 0.2–1.2)
BUN SERPL-MCNC: 77 MG/DL — HIGH (ref 7–23)
CALCIUM SERPL-MCNC: 9 MG/DL — SIGNIFICANT CHANGE UP (ref 8.5–10.1)
CHLORIDE SERPL-SCNC: 101 MMOL/L — SIGNIFICANT CHANGE UP (ref 96–108)
CHOLEST SERPL-MCNC: 163 MG/DL — SIGNIFICANT CHANGE UP (ref 10–199)
CK MB BLD-MCNC: 2 % — SIGNIFICANT CHANGE UP (ref 0–3.5)
CK MB CFR SERPL CALC: 1.4 NG/ML — SIGNIFICANT CHANGE UP (ref 0.5–3.6)
CK SERPL-CCNC: 70 U/L — SIGNIFICANT CHANGE UP (ref 26–192)
CO2 SERPL-SCNC: 25 MMOL/L — SIGNIFICANT CHANGE UP (ref 22–31)
CREAT SERPL-MCNC: 2.77 MG/DL — HIGH (ref 0.5–1.3)
EOSINOPHIL NFR BLD AUTO: 7 % — HIGH (ref 0–6)
GLUCOSE BLDC GLUCOMTR-MCNC: 192 MG/DL — HIGH (ref 70–99)
GLUCOSE BLDC GLUCOMTR-MCNC: 203 MG/DL — HIGH (ref 70–99)
GLUCOSE BLDC GLUCOMTR-MCNC: 210 MG/DL — HIGH (ref 70–99)
GLUCOSE BLDC GLUCOMTR-MCNC: 50 MG/DL — LOW (ref 70–99)
GLUCOSE SERPL-MCNC: 84 MG/DL — SIGNIFICANT CHANGE UP (ref 70–99)
HCT VFR BLD CALC: 30.4 % — LOW (ref 34.5–45)
HDLC SERPL-MCNC: 42 MG/DL — SIGNIFICANT CHANGE UP (ref 40–125)
HGB BLD-MCNC: 10.1 G/DL — LOW (ref 11.5–15.5)
HYPOCHROMIA BLD QL: SLIGHT — SIGNIFICANT CHANGE UP
INR BLD: 1.13 RATIO — SIGNIFICANT CHANGE UP (ref 0.88–1.16)
LIPID PNL WITH DIRECT LDL SERPL: 107 MG/DL — SIGNIFICANT CHANGE UP
LYMPHOCYTES # BLD AUTO: 0.75 K/UL — LOW (ref 1–3.3)
LYMPHOCYTES # BLD AUTO: 25 % — SIGNIFICANT CHANGE UP (ref 13–44)
MACROCYTES BLD QL: SIGNIFICANT CHANGE UP
MAGNESIUM SERPL-MCNC: 3.5 MG/DL — HIGH (ref 1.6–2.6)
MANUAL SMEAR VERIFICATION: SIGNIFICANT CHANGE UP
MCHC RBC-ENTMCNC: 29.4 PG — SIGNIFICANT CHANGE UP (ref 27–34)
MCHC RBC-ENTMCNC: 33.2 GM/DL — SIGNIFICANT CHANGE UP (ref 32–36)
MCV RBC AUTO: 88.6 FL — SIGNIFICANT CHANGE UP (ref 80–100)
METAMYELOCYTES # FLD: 1 % — HIGH (ref 0–0)
MONOCYTES NFR BLD AUTO: 5 % — SIGNIFICANT CHANGE UP (ref 2–14)
NEUTROPHILS # BLD AUTO: 1.85 K/UL — SIGNIFICANT CHANGE UP (ref 1.8–7.4)
NEUTROPHILS NFR BLD AUTO: 62 % — SIGNIFICANT CHANGE UP (ref 43–77)
NRBC # BLD: 0 /100 — SIGNIFICANT CHANGE UP (ref 0–0)
NRBC # BLD: SIGNIFICANT CHANGE UP /100 WBCS (ref 0–0)
NT-PROBNP SERPL-SCNC: HIGH PG/ML (ref 0–450)
PLAT MORPH BLD: NORMAL — SIGNIFICANT CHANGE UP
PLATELET # BLD AUTO: 211 K/UL — SIGNIFICANT CHANGE UP (ref 150–400)
PLATELET COUNT - ESTIMATE: NORMAL — SIGNIFICANT CHANGE UP
POLYCHROMASIA BLD QL SMEAR: SLIGHT — SIGNIFICANT CHANGE UP
POTASSIUM SERPL-MCNC: 4.8 MMOL/L — SIGNIFICANT CHANGE UP (ref 3.5–5.3)
POTASSIUM SERPL-SCNC: 4.8 MMOL/L — SIGNIFICANT CHANGE UP (ref 3.5–5.3)
PROT SERPL-MCNC: 8.9 GM/DL — HIGH (ref 6–8.3)
PROTHROM AB SERPL-ACNC: 12.3 SEC — SIGNIFICANT CHANGE UP (ref 9.8–12.7)
RBC # BLD: 3.43 M/UL — LOW (ref 3.8–5.2)
RBC # FLD: 13.8 % — SIGNIFICANT CHANGE UP (ref 10.3–14.5)
RBC BLD AUTO: ABNORMAL
SODIUM SERPL-SCNC: 135 MMOL/L — SIGNIFICANT CHANGE UP (ref 135–145)
T3 SERPL-MCNC: 90 NG/DL — SIGNIFICANT CHANGE UP (ref 80–200)
T4 AB SER-ACNC: 8.6 UG/DL — SIGNIFICANT CHANGE UP (ref 4.6–12)
TARGETS BLD QL SMEAR: SLIGHT — SIGNIFICANT CHANGE UP
TOTAL CHOLESTEROL/HDL RATIO MEASUREMENT: 3.9 RATIO — SIGNIFICANT CHANGE UP (ref 3.3–7.1)
TRIGL SERPL-MCNC: 69 MG/DL — SIGNIFICANT CHANGE UP (ref 10–149)
TROPONIN I SERPL-MCNC: 2.69 NG/ML — HIGH (ref 0.01–0.04)
TROPONIN I SERPL-MCNC: 2.89 NG/ML — HIGH (ref 0.01–0.04)
TROPONIN I SERPL-MCNC: 2.91 NG/ML — HIGH (ref 0.01–0.04)
TSH SERPL-MCNC: 2.67 UU/ML — SIGNIFICANT CHANGE UP (ref 0.36–3.74)
WBC # BLD: 2.99 K/UL — LOW (ref 3.8–10.5)
WBC # FLD AUTO: 2.99 K/UL — LOW (ref 3.8–10.5)

## 2018-03-20 PROCEDURE — 71045 X-RAY EXAM CHEST 1 VIEW: CPT | Mod: 26

## 2018-03-20 PROCEDURE — 93010 ELECTROCARDIOGRAM REPORT: CPT

## 2018-03-20 PROCEDURE — 93970 EXTREMITY STUDY: CPT | Mod: 26

## 2018-03-20 RX ORDER — HYDRALAZINE HCL 50 MG
50 TABLET ORAL EVERY 8 HOURS
Qty: 0 | Refills: 0 | Status: DISCONTINUED | OUTPATIENT
Start: 2018-03-20 | End: 2018-03-26

## 2018-03-20 RX ORDER — SODIUM CHLORIDE 9 MG/ML
1000 INJECTION, SOLUTION INTRAVENOUS
Qty: 0 | Refills: 0 | Status: DISCONTINUED | OUTPATIENT
Start: 2018-03-20 | End: 2018-04-03

## 2018-03-20 RX ORDER — TICAGRELOR 90 MG/1
90 TABLET ORAL
Qty: 0 | Refills: 0 | Status: DISCONTINUED | OUTPATIENT
Start: 2018-03-20 | End: 2018-04-02

## 2018-03-20 RX ORDER — HYDRALAZINE HCL 50 MG
0 TABLET ORAL
Qty: 0 | Refills: 0 | COMMUNITY

## 2018-03-20 RX ORDER — RANOLAZINE 500 MG/1
500 TABLET, FILM COATED, EXTENDED RELEASE ORAL
Qty: 0 | Refills: 0 | Status: DISCONTINUED | OUTPATIENT
Start: 2018-03-20 | End: 2018-04-03

## 2018-03-20 RX ORDER — INSULIN LISPRO 100/ML
VIAL (ML) SUBCUTANEOUS
Qty: 0 | Refills: 0 | Status: DISCONTINUED | OUTPATIENT
Start: 2018-03-20 | End: 2018-04-03

## 2018-03-20 RX ORDER — INSULIN GLARGINE 100 [IU]/ML
14 INJECTION, SOLUTION SUBCUTANEOUS
Qty: 0 | Refills: 0 | COMMUNITY

## 2018-03-20 RX ORDER — SODIUM BICARBONATE 1 MEQ/ML
325 SYRINGE (ML) INTRAVENOUS
Qty: 0 | Refills: 0 | Status: DISCONTINUED | OUTPATIENT
Start: 2018-03-20 | End: 2018-03-28

## 2018-03-20 RX ORDER — BRIMONIDINE TARTRATE, TIMOLOL MALEATE 2; 5 MG/ML; MG/ML
1 SOLUTION/ DROPS OPHTHALMIC
Qty: 0 | Refills: 0 | COMMUNITY

## 2018-03-20 RX ORDER — ASPIRIN/CALCIUM CARB/MAGNESIUM 324 MG
1 TABLET ORAL
Qty: 0 | Refills: 0 | COMMUNITY

## 2018-03-20 RX ORDER — FUROSEMIDE 40 MG
40 TABLET ORAL ONCE
Qty: 0 | Refills: 0 | Status: COMPLETED | OUTPATIENT
Start: 2018-03-20 | End: 2018-03-20

## 2018-03-20 RX ORDER — ENOXAPARIN SODIUM 100 MG/ML
60 INJECTION SUBCUTANEOUS DAILY
Qty: 0 | Refills: 0 | Status: DISCONTINUED | OUTPATIENT
Start: 2018-03-20 | End: 2018-03-27

## 2018-03-20 RX ORDER — DORZOLAMIDE HYDROCHLORIDE 20 MG/ML
1 SOLUTION/ DROPS OPHTHALMIC THREE TIMES A DAY
Qty: 0 | Refills: 0 | Status: DISCONTINUED | OUTPATIENT
Start: 2018-03-20 | End: 2018-04-03

## 2018-03-20 RX ORDER — BUDESONIDE AND FORMOTEROL FUMARATE DIHYDRATE 160; 4.5 UG/1; UG/1
2 AEROSOL RESPIRATORY (INHALATION)
Qty: 0 | Refills: 0 | Status: DISCONTINUED | OUTPATIENT
Start: 2018-03-20 | End: 2018-04-03

## 2018-03-20 RX ORDER — RAMIPRIL 5 MG
1 CAPSULE ORAL
Qty: 0 | Refills: 0 | COMMUNITY

## 2018-03-20 RX ORDER — DEXTROSE 50 % IN WATER 50 %
1 SYRINGE (ML) INTRAVENOUS ONCE
Qty: 0 | Refills: 0 | Status: DISCONTINUED | OUTPATIENT
Start: 2018-03-20 | End: 2018-04-03

## 2018-03-20 RX ORDER — DEXTROSE 50 % IN WATER 50 %
12.5 SYRINGE (ML) INTRAVENOUS ONCE
Qty: 0 | Refills: 0 | Status: DISCONTINUED | OUTPATIENT
Start: 2018-03-20 | End: 2018-04-03

## 2018-03-20 RX ORDER — GLUCAGON INJECTION, SOLUTION 0.5 MG/.1ML
1 INJECTION, SOLUTION SUBCUTANEOUS ONCE
Qty: 0 | Refills: 0 | Status: DISCONTINUED | OUTPATIENT
Start: 2018-03-20 | End: 2018-04-03

## 2018-03-20 RX ORDER — INSULIN GLARGINE 100 [IU]/ML
14 INJECTION, SOLUTION SUBCUTANEOUS EVERY MORNING
Qty: 0 | Refills: 0 | Status: DISCONTINUED | OUTPATIENT
Start: 2018-03-20 | End: 2018-04-03

## 2018-03-20 RX ORDER — DEXTROSE 50 % IN WATER 50 %
25 SYRINGE (ML) INTRAVENOUS ONCE
Qty: 0 | Refills: 0 | Status: DISCONTINUED | OUTPATIENT
Start: 2018-03-20 | End: 2018-04-03

## 2018-03-20 RX ORDER — BRINZOLAMIDE 10 MG/ML
0 SUSPENSION/ DROPS OPHTHALMIC
Qty: 0 | Refills: 0 | COMMUNITY

## 2018-03-20 RX ORDER — FUROSEMIDE 40 MG
80 TABLET ORAL EVERY 12 HOURS
Qty: 0 | Refills: 0 | Status: DISCONTINUED | OUTPATIENT
Start: 2018-03-20 | End: 2018-03-21

## 2018-03-20 RX ORDER — SENNA PLUS 8.6 MG/1
2 TABLET ORAL AT BEDTIME
Qty: 0 | Refills: 0 | Status: DISCONTINUED | OUTPATIENT
Start: 2018-03-20 | End: 2018-04-03

## 2018-03-20 RX ORDER — TICAGRELOR 90 MG/1
1 TABLET ORAL
Qty: 0 | Refills: 0 | COMMUNITY

## 2018-03-20 RX ORDER — METOPROLOL TARTRATE 50 MG
1 TABLET ORAL
Qty: 0 | Refills: 0 | COMMUNITY

## 2018-03-20 RX ORDER — ASPIRIN/CALCIUM CARB/MAGNESIUM 324 MG
81 TABLET ORAL DAILY
Qty: 0 | Refills: 0 | Status: DISCONTINUED | OUTPATIENT
Start: 2018-03-20 | End: 2018-04-03

## 2018-03-20 RX ORDER — ASPIRIN/CALCIUM CARB/MAGNESIUM 324 MG
325 TABLET ORAL ONCE
Qty: 0 | Refills: 0 | Status: COMPLETED | OUTPATIENT
Start: 2018-03-20 | End: 2018-03-20

## 2018-03-20 RX ORDER — SODIUM BICARBONATE 1 MEQ/ML
1 SYRINGE (ML) INTRAVENOUS
Qty: 0 | Refills: 0 | COMMUNITY

## 2018-03-20 RX ADMIN — DORZOLAMIDE HYDROCHLORIDE 1 DROP(S): 20 SOLUTION/ DROPS OPHTHALMIC at 21:18

## 2018-03-20 RX ADMIN — TICAGRELOR 90 MILLIGRAM(S): 90 TABLET ORAL at 17:39

## 2018-03-20 RX ADMIN — Medication 325 MILLIGRAM(S): at 17:39

## 2018-03-20 RX ADMIN — ENOXAPARIN SODIUM 60 MILLIGRAM(S): 100 INJECTION SUBCUTANEOUS at 13:25

## 2018-03-20 RX ADMIN — Medication 81 MILLIGRAM(S): at 13:26

## 2018-03-20 RX ADMIN — Medication 325 MILLIGRAM(S): at 03:53

## 2018-03-20 RX ADMIN — Medication 2: at 16:14

## 2018-03-20 RX ADMIN — RANOLAZINE 500 MILLIGRAM(S): 500 TABLET, FILM COATED, EXTENDED RELEASE ORAL at 17:39

## 2018-03-20 RX ADMIN — Medication 80 MILLIGRAM(S): at 17:39

## 2018-03-20 RX ADMIN — Medication 40 MILLIGRAM(S): at 03:52

## 2018-03-20 RX ADMIN — BUDESONIDE AND FORMOTEROL FUMARATE DIHYDRATE 2 PUFF(S): 160; 4.5 AEROSOL RESPIRATORY (INHALATION) at 17:40

## 2018-03-20 RX ADMIN — Medication 50 MILLIGRAM(S): at 21:18

## 2018-03-20 NOTE — DISCHARGE NOTE ADULT - NS AS DC HF EDUCATION INSTRUCTIONS
Activities as tolerated/Low salt diet/Call Primary Care Provider for follow-up after discharge/Monitor Weight Daily/Report weight gain of 2 or more pounds in one day or 3 or more pounds in one week, worsening shortness of breath, fatigue, weakness, increased swelling of hands and feet to primary care provider

## 2018-03-20 NOTE — ED PROVIDER NOTE - PROGRESS NOTE DETAILS
Consulted CCU fellow about pt, awaiting call back CCU fellow, Dr. Bansal returned call, rec transfer to [facility].  Pt endorsed to ED physician Dr. Almaraz. Pt declined transfer to [facility], discussed w PMD, admitted to Dr. Walker.  Paged Dr. Mendoza. CCU fellow, Dr. Bansal returned call, no emergent cath at this time, accept transfer to [facility].  Pt endorsed to ED physician Dr. Almaraz.

## 2018-03-20 NOTE — DISCHARGE NOTE ADULT - PATIENT PORTAL LINK FT
You can access the [facility] Portal, offered by [facility], by registering with the following website: [email or web address]

## 2018-03-20 NOTE — H&P ADULT - ASSESSMENT
84 yo B F with CAD hx PCI trifascicular HB  Acute MI  Acute on chronic CHF  CKD  T2DM  HTN  PVD  Blindness   P  Telemetry  CE  Hold B-blocker  IV diuretic  Lovenox + ASA + Brilinta  I'll discuss with Dr Mendoza possible transfer for catheterization and possible PPM

## 2018-03-20 NOTE — ED ADULT NURSE NOTE - CAS EDN DISCHARGE ASSESSMENT
Awake/Dressing clean and dry/No adverse reaction to first time med in ED/Alert and oriented to person, place and time

## 2018-03-20 NOTE — ED PROVIDER NOTE - OBJECTIVE STATEMENT
Pertinent PMH/PSH/FHx/SHx and Review of Systems contained within:    84yo F w PMH of CAD s/p stents, PVD, HTN, DM, s/p right foot amputation, right eye blindness, s/p cataract surgery, s/p appendectomy, s/p hysterectomy, HTN, CHF presents to ED c/o SOB.  Pt states sx have been present for about 1wk.  Denies fever, chills, cough, abd pain, N/V/D, palpitations.  Pt states she has been compliant w 60mg lasix but cont to have SOB & LE swelling.    No fever/chills, No photophobia/eye pain/changes in vision, No ear pain/sore throat/dysphagia, No chest pain/palpitations, +SOB/cough, No abdominal pain, no dysuria/frequency/discharge, No neck/back pain, no rash, no changes in neurological status/function. Pertinent PMH/PSH/FHx/SHx and Review of Systems contained within:    84yo F w PMH of CAD s/p stents, PVD, HTN, DM, s/p right foot amputation, right eye blindness, s/p cataract surgery, s/p appendectomy, s/p hysterectomy, HTN, CHF presents to ED c/o SOB.  Pt states sx have been present for about 1wk.  Denies fever, chills, cough, abd pain, N/V/D, palpitations.  Pt states she has been compliant w 60mg lasix but cont to have SOB & LE swelling.    No fever/chills, No photophobia/eye pain/changes in vision, No ear pain/sore throat/dysphagia, No palpitations, +SOB/cough, No abdominal pain, no dysuria/frequency/discharge, No neck/back pain, no rash, no changes in neurological status/function.

## 2018-03-20 NOTE — CONSULT NOTE ADULT - SUBJECTIVE AND OBJECTIVE BOX
Information from chart:  "Patient is a 85y old  Female who presents with a chief complaint of SOB (20 Mar 2018 15:43)    HPI:  PT C hX dm cad pci recently, for 2 days > SOB on exertion, No CP, No fever, chills, N/V/C/D (20 Mar 2018 09:19)   "  Patient with a hx of CKD 3 worsening trend with increasing diuretic requirements; Echo from 2017 normal EF;        PAST MEDICAL & SURGICAL HISTORY:  CAD S/P percutaneous coronary angioplasty  PVD (peripheral vascular disease)  HTN (hypertension)  DM (diabetes mellitus screen)  S/P foot surgery: left foot surgery with sage s/p fx.  S/P cataract surgery  S/P appendectomy  S/P hysterectomy  Amputation foot, unilat    FAMILY HISTORY:  No pertinent family history in first degree relatives    Allergies    codeine (Other)    Intolerances      Home Medications:  allopurinol 100 mg oral tablet: 1 tab(s) orally once a day (20 Mar 2018 05:51)  Aspirin Enteric Coated 81 mg oral delayed release tablet: 1 tab(s) orally once a day (20 Mar 2018 05:51)  Azopt 1% ophthalmic suspension: to each affected eye 2 times a day (20 Mar 2018 05:51)  Brilinta (ticagrelor) 90 mg oral tablet: 1 tab(s) orally 2 times a day (20 Mar 2018 05:51)  Combigan 0.2%-0.5% ophthalmic solution: 1 drop(s) to each affected eye every 12 hours (20 Mar 2018 05:51)  docusate sodium 100 mg oral capsule: 2 cap(s) orally once a day (20 Mar 2018 05:51)  hydrALAZINE 50 mg oral tablet:  orally every 8 hours (20 Mar 2018 05:51)  Lantus: 14 unit(s) subcutaneous once a day in AM (20 Mar 2018 05:51)  metoprolol succinate 25 mg oral tablet, extended release: 1 tab(s) orally once a day (20 Mar 2018 05:51)  ramipril 1.25 mg oral capsule: 1 cap(s) orally once a day (20 Mar 2018 05:51)  senna oral tablet: 2 tab(s) orally once a day (at bedtime) (20 Mar 2018 05:51)  sodium bicarbonate 325 mg oral tablet: 1 tab(s) orally 2 times a day (20 Mar 2018 05:51)    MEDICATIONS  (STANDING):  aspirin enteric coated 81 milliGRAM(s) Oral daily  buDESOnide 160 MICROgram(s)/formoterol 4.5 MICROgram(s) Inhaler 2 Puff(s) Inhalation two times a day  dextrose 5%. 1000 milliLiter(s) (50 mL/Hr) IV Continuous <Continuous>  dextrose 50% Injectable 12.5 Gram(s) IV Push once  dextrose 50% Injectable 25 Gram(s) IV Push once  dextrose 50% Injectable 25 Gram(s) IV Push once  dorzolamide 2% Ophthalmic Solution 1 Drop(s) Both EYES three times a day  enoxaparin Injectable 60 milliGRAM(s) SubCutaneous daily  furosemide   Injectable 80 milliGRAM(s) IV Push every 12 hours  hydrALAZINE 50 milliGRAM(s) Oral every 8 hours  insulin glargine Injectable (LANTUS) 14 Unit(s) SubCutaneous every morning  insulin lispro (HumaLOG) corrective regimen sliding scale   SubCutaneous three times a day before meals  ranolazine 500 milliGRAM(s) Oral two times a day  senna 2 Tablet(s) Oral at bedtime  sodium bicarbonate 325 milliGRAM(s) Oral two times a day  ticagrelor 90 milliGRAM(s) Oral two times a day    MEDICATIONS  (PRN):  dextrose Gel 1 Dose(s) Oral once PRN Blood Glucose LESS THAN 70 milliGRAM(s)/deciliter  glucagon  Injectable 1 milliGRAM(s) IntraMuscular once PRN Glucose LESS THAN 70 milligrams/deciliter    Vital Signs Last 24 Hrs  T(C): 36.1 (20 Mar 2018 15:45), Max: 36.5 (20 Mar 2018 05:01)  T(F): 97 (20 Mar 2018 15:45), Max: 97.7 (20 Mar 2018 05:01)  HR: 66 (20 Mar 2018 15:45) (52 - 66)  BP: 138/74 (20 Mar 2018 15:45) (137/67 - 145/61)  BP(mean): --  RR: 16 (20 Mar 2018 15:45) (15 - 20)  SpO2: 96% (20 Mar 2018 12:58) (96% - 100%)    Daily Height in cm: 149.86 (20 Mar 2018 15:45)    Daily Weight in k.6 (20 Mar 2018 15:45)    CAPILLARY BLOOD GLUCOSE      POCT Blood Glucose.: 192 mg/dL (20 Mar 2018 16:12)  POCT Blood Glucose.: 203 mg/dL (20 Mar 2018 12:51)  POCT Blood Glucose.: 50 mg/dL (20 Mar 2018 11:28)    PHYSICAL EXAM:      T(C): 36.1 (18 @ 15:45), Max: 36.5 (18 @ 05:01)  HR: 66 (18 @ 15:45) (52 - 66)  BP: 138/74 (18 @ 15:45) (137/67 - 145/61)  RR: 16 (18 @ 15:45) (15 - 20)  SpO2: 96% (18 @ 12:58) (96% - 100%)  Wt(kg): --  Respiratory: clear anteriorly, decreased BS at bases with crackles  Cardiovascular: S1 S2  Gastrointestinal: soft NT ND +BS  Extremities:  1-2 edema                  135  |  101  |  77<H>  ----------------------------<  84  4.8   |  25  |  2.77<H>    Ca    9.0      20 Mar 2018 02:36  Mg     3.5         TPro  8.9<H>  /  Alb  3.2<L>  /  TBili  0.3  /  DBili  x   /  AST  33  /  ALT  62  /  AlkPhos  107                            10.1   2.99  )-----------( 211      ( 20 Mar 2018 02:36 )             30.4             Assessment and Plan    MAXIMINO CKD 3; pre renal azotemia, CRS, diuresis;   Considering hx of PAD, will r/o renal artery stenosis;   Hold ACE for now; repeat echo ( unless recently done as outpatient);  Warranted diuresis, will follow. Information from chart:  "Patient is a 85y old  Female who presents with a chief complaint of SOB (20 Mar 2018 15:43)    HPI:  PT C hX dm cad pci recently, for 2 days > SOB on exertion, No CP, No fever, chills, N/V/C/D (20 Mar 2018 09:19)   "  Patient with a hx of CKD 3 worsening trend with increasing diuretic requirements; Echo from 2017 normal EF;        PAST MEDICAL & SURGICAL HISTORY:  CAD S/P percutaneous coronary angioplasty  PVD (peripheral vascular disease)  HTN (hypertension)  DM (diabetes mellitus screen)  S/P foot surgery: left foot surgery with sage s/p fx.  S/P cataract surgery  S/P appendectomy  S/P hysterectomy  Amputation foot, unilat    FAMILY HISTORY:  No pertinent family history in first degree relatives    Allergies    codeine (Other)    Intolerances      Home Medications:  allopurinol 100 mg oral tablet: 1 tab(s) orally once a day (20 Mar 2018 05:51)  Aspirin Enteric Coated 81 mg oral delayed release tablet: 1 tab(s) orally once a day (20 Mar 2018 05:51)  Azopt 1% ophthalmic suspension: to each affected eye 2 times a day (20 Mar 2018 05:51)  Brilinta (ticagrelor) 90 mg oral tablet: 1 tab(s) orally 2 times a day (20 Mar 2018 05:51)  Combigan 0.2%-0.5% ophthalmic solution: 1 drop(s) to each affected eye every 12 hours (20 Mar 2018 05:51)  docusate sodium 100 mg oral capsule: 2 cap(s) orally once a day (20 Mar 2018 05:51)  hydrALAZINE 50 mg oral tablet:  orally every 8 hours (20 Mar 2018 05:51)  Lantus: 14 unit(s) subcutaneous once a day in AM (20 Mar 2018 05:51)  metoprolol succinate 25 mg oral tablet, extended release: 1 tab(s) orally once a day (20 Mar 2018 05:51)  ramipril 1.25 mg oral capsule: 1 cap(s) orally once a day (20 Mar 2018 05:51)  senna oral tablet: 2 tab(s) orally once a day (at bedtime) (20 Mar 2018 05:51)  sodium bicarbonate 325 mg oral tablet: 1 tab(s) orally 2 times a day (20 Mar 2018 05:51)    MEDICATIONS  (STANDING):  aspirin enteric coated 81 milliGRAM(s) Oral daily  buDESOnide 160 MICROgram(s)/formoterol 4.5 MICROgram(s) Inhaler 2 Puff(s) Inhalation two times a day  dextrose 5%. 1000 milliLiter(s) (50 mL/Hr) IV Continuous <Continuous>  dextrose 50% Injectable 12.5 Gram(s) IV Push once  dextrose 50% Injectable 25 Gram(s) IV Push once  dextrose 50% Injectable 25 Gram(s) IV Push once  dorzolamide 2% Ophthalmic Solution 1 Drop(s) Both EYES three times a day  enoxaparin Injectable 60 milliGRAM(s) SubCutaneous daily  furosemide   Injectable 80 milliGRAM(s) IV Push every 12 hours  hydrALAZINE 50 milliGRAM(s) Oral every 8 hours  insulin glargine Injectable (LANTUS) 14 Unit(s) SubCutaneous every morning  insulin lispro (HumaLOG) corrective regimen sliding scale   SubCutaneous three times a day before meals  ranolazine 500 milliGRAM(s) Oral two times a day  senna 2 Tablet(s) Oral at bedtime  sodium bicarbonate 325 milliGRAM(s) Oral two times a day  ticagrelor 90 milliGRAM(s) Oral two times a day    MEDICATIONS  (PRN):  dextrose Gel 1 Dose(s) Oral once PRN Blood Glucose LESS THAN 70 milliGRAM(s)/deciliter  glucagon  Injectable 1 milliGRAM(s) IntraMuscular once PRN Glucose LESS THAN 70 milligrams/deciliter    Vital Signs Last 24 Hrs  T(C): 36.1 (20 Mar 2018 15:45), Max: 36.5 (20 Mar 2018 05:01)  T(F): 97 (20 Mar 2018 15:45), Max: 97.7 (20 Mar 2018 05:01)  HR: 66 (20 Mar 2018 15:45) (52 - 66)  BP: 138/74 (20 Mar 2018 15:45) (137/67 - 145/61)  BP(mean): --  RR: 16 (20 Mar 2018 15:45) (15 - 20)  SpO2: 96% (20 Mar 2018 12:58) (96% - 100%)    Daily Height in cm: 149.86 (20 Mar 2018 15:45)    Daily Weight in k.6 (20 Mar 2018 15:45)    CAPILLARY BLOOD GLUCOSE      POCT Blood Glucose.: 192 mg/dL (20 Mar 2018 16:12)  POCT Blood Glucose.: 203 mg/dL (20 Mar 2018 12:51)  POCT Blood Glucose.: 50 mg/dL (20 Mar 2018 11:28)    PHYSICAL EXAM:      T(C): 36.1 (18 @ 15:45), Max: 36.5 (18 @ 05:01)  HR: 66 (18 @ 15:45) (52 - 66)  BP: 138/74 (18 @ 15:45) (137/67 - 145/61)  RR: 16 (18 @ 15:45) (15 - 20)  SpO2: 96% (18 @ 12:58) (96% - 100%)  Wt(kg): --  Respiratory: clear anteriorly, decreased BS at bases with crackles  Cardiovascular: S1 S2  Gastrointestinal: soft NT ND +BS  Extremities:  1-2 edema                  135  |  101  |  77<H>  ----------------------------<  84  4.8   |  25  |  2.77<H>    Ca    9.0      20 Mar 2018 02:36  Mg     3.5         TPro  8.9<H>  /  Alb  3.2<L>  /  TBili  0.3  /  DBili  x   /  AST  33  /  ALT  62  /  AlkPhos  107                            10.1   2.99  )-----------( 211      ( 20 Mar 2018 02:36 )             30.4             Assessment and Plan    MAXIMINO CKD 3; pre renal azotemia, CRS, diuresis; r/o post renal factors ( higher PVR in past).  Considering hx of PAD, will r/o renal artery stenosis;   Hold ACE for now; repeat echo ( unless recently done as outpatient);  Warranted diuresis, will follow. Information from chart:  "Patient is a 85y old  Female who presents with a chief complaint of SOB (20 Mar 2018 15:43)    HPI:  PT C hX dm cad pci recently, for 2 days > SOB on exertion, No CP, No fever, chills, N/V/C/D (20 Mar 2018 09:19)   "  Patient with a hx of CKD 3 worsening trend with increasing diuretic requirements; Echo from 2017 normal EF;        PAST MEDICAL & SURGICAL HISTORY:  CAD S/P percutaneous coronary angioplasty  PVD (peripheral vascular disease)  HTN (hypertension)  DM (diabetes mellitus screen)  S/P foot surgery: left foot surgery with sage s/p fx.  S/P cataract surgery  S/P appendectomy  S/P hysterectomy  Amputation foot, unilat    FAMILY HISTORY:  No pertinent family history in first degree relatives    Allergies    codeine (Other)    Intolerances      Home Medications:  allopurinol 100 mg oral tablet: 1 tab(s) orally once a day (20 Mar 2018 05:51)  Aspirin Enteric Coated 81 mg oral delayed release tablet: 1 tab(s) orally once a day (20 Mar 2018 05:51)  Azopt 1% ophthalmic suspension: to each affected eye 2 times a day (20 Mar 2018 05:51)  Brilinta (ticagrelor) 90 mg oral tablet: 1 tab(s) orally 2 times a day (20 Mar 2018 05:51)  Combigan 0.2%-0.5% ophthalmic solution: 1 drop(s) to each affected eye every 12 hours (20 Mar 2018 05:51)  docusate sodium 100 mg oral capsule: 2 cap(s) orally once a day (20 Mar 2018 05:51)  hydrALAZINE 50 mg oral tablet:  orally every 8 hours (20 Mar 2018 05:51)  Lantus: 14 unit(s) subcutaneous once a day in AM (20 Mar 2018 05:51)  metoprolol succinate 25 mg oral tablet, extended release: 1 tab(s) orally once a day (20 Mar 2018 05:51)  ramipril 1.25 mg oral capsule: 1 cap(s) orally once a day (20 Mar 2018 05:51)  senna oral tablet: 2 tab(s) orally once a day (at bedtime) (20 Mar 2018 05:51)  sodium bicarbonate 325 mg oral tablet: 1 tab(s) orally 2 times a day (20 Mar 2018 05:51)    MEDICATIONS  (STANDING):  aspirin enteric coated 81 milliGRAM(s) Oral daily  buDESOnide 160 MICROgram(s)/formoterol 4.5 MICROgram(s) Inhaler 2 Puff(s) Inhalation two times a day  dextrose 5%. 1000 milliLiter(s) (50 mL/Hr) IV Continuous <Continuous>  dextrose 50% Injectable 12.5 Gram(s) IV Push once  dextrose 50% Injectable 25 Gram(s) IV Push once  dextrose 50% Injectable 25 Gram(s) IV Push once  dorzolamide 2% Ophthalmic Solution 1 Drop(s) Both EYES three times a day  enoxaparin Injectable 60 milliGRAM(s) SubCutaneous daily  furosemide   Injectable 80 milliGRAM(s) IV Push every 12 hours  hydrALAZINE 50 milliGRAM(s) Oral every 8 hours  insulin glargine Injectable (LANTUS) 14 Unit(s) SubCutaneous every morning  insulin lispro (HumaLOG) corrective regimen sliding scale   SubCutaneous three times a day before meals  ranolazine 500 milliGRAM(s) Oral two times a day  senna 2 Tablet(s) Oral at bedtime  sodium bicarbonate 325 milliGRAM(s) Oral two times a day  ticagrelor 90 milliGRAM(s) Oral two times a day    MEDICATIONS  (PRN):  dextrose Gel 1 Dose(s) Oral once PRN Blood Glucose LESS THAN 70 milliGRAM(s)/deciliter  glucagon  Injectable 1 milliGRAM(s) IntraMuscular once PRN Glucose LESS THAN 70 milligrams/deciliter    Vital Signs Last 24 Hrs  T(C): 36.1 (20 Mar 2018 15:45), Max: 36.5 (20 Mar 2018 05:01)  T(F): 97 (20 Mar 2018 15:45), Max: 97.7 (20 Mar 2018 05:01)  HR: 66 (20 Mar 2018 15:45) (52 - 66)  BP: 138/74 (20 Mar 2018 15:45) (137/67 - 145/61)  BP(mean): --  RR: 16 (20 Mar 2018 15:45) (15 - 20)  SpO2: 96% (20 Mar 2018 12:58) (96% - 100%)    Daily Height in cm: 149.86 (20 Mar 2018 15:45)    Daily Weight in k.6 (20 Mar 2018 15:45)    CAPILLARY BLOOD GLUCOSE      POCT Blood Glucose.: 192 mg/dL (20 Mar 2018 16:12)  POCT Blood Glucose.: 203 mg/dL (20 Mar 2018 12:51)  POCT Blood Glucose.: 50 mg/dL (20 Mar 2018 11:28)    PHYSICAL EXAM:      T(C): 36.1 (18 @ 15:45), Max: 36.5 (18 @ 05:01)  HR: 66 (18 @ 15:45) (52 - 66)  BP: 138/74 (18 @ 15:45) (137/67 - 145/61)  RR: 16 (18 @ 15:45) (15 - 20)  SpO2: 96% (18 @ 12:58) (96% - 100%)  Wt(kg): --  Respiratory: clear anteriorly, decreased BS at bases with crackles  Cardiovascular: S1 S2  Gastrointestinal: soft NT ND +BS  Extremities:  1-2 edema                  135  |  101  |  77<H>  ----------------------------<  84  4.8   |  25  |  2.77<H>    Ca    9.0      20 Mar 2018 02:36  Mg     3.5         TPro  8.9<H>  /  Alb  3.2<L>  /  TBili  0.3  /  DBili  x   /  AST  33  /  ALT  62  /  AlkPhos  107                            10.1   2.99  )-----------( 211      ( 20 Mar 2018 02:36 )             30.4             Assessment and Plan    MAXIMINO CKD 3; pre renal azotemia, CRS, diuresis; r/o post renal factors ( higher PVR in past); paraprotein disease.  Considering hx of PAD, will r/o renal artery stenosis;   Hold ACE for now; repeat echo ( unless recently done as outpatient);  Paraprotein levels;  Warranted diuresis, will follow. Information from chart:  "Patient is a 85y old  Female who presents with a chief complaint of SOB (20 Mar 2018 15:43)    HPI:  PT C hX dm cad pci recently, for 2 days > SOB on exertion, No CP, No fever, chills, N/V/C/D (20 Mar 2018 09:19)   "  Patient with a hx of CKD 3 worsening trend with increasing diuretic requirements; Echo from 2017 normal EF;  Paraprotein noted in 2017.        PAST MEDICAL & SURGICAL HISTORY:  CAD S/P percutaneous coronary angioplasty  PVD (peripheral vascular disease)  HTN (hypertension)  DM (diabetes mellitus screen)  S/P foot surgery: left foot surgery with sage s/p fx.  S/P cataract surgery  S/P appendectomy  S/P hysterectomy  Amputation foot, unilat    FAMILY HISTORY:  No pertinent family history in first degree relatives    Allergies    codeine (Other)    Intolerances      Home Medications:  allopurinol 100 mg oral tablet: 1 tab(s) orally once a day (20 Mar 2018 05:51)  Aspirin Enteric Coated 81 mg oral delayed release tablet: 1 tab(s) orally once a day (20 Mar 2018 05:51)  Azopt 1% ophthalmic suspension: to each affected eye 2 times a day (20 Mar 2018 05:51)  Brilinta (ticagrelor) 90 mg oral tablet: 1 tab(s) orally 2 times a day (20 Mar 2018 05:51)  Combigan 0.2%-0.5% ophthalmic solution: 1 drop(s) to each affected eye every 12 hours (20 Mar 2018 05:51)  docusate sodium 100 mg oral capsule: 2 cap(s) orally once a day (20 Mar 2018 05:51)  hydrALAZINE 50 mg oral tablet:  orally every 8 hours (20 Mar 2018 05:51)  Lantus: 14 unit(s) subcutaneous once a day in AM (20 Mar 2018 05:51)  metoprolol succinate 25 mg oral tablet, extended release: 1 tab(s) orally once a day (20 Mar 2018 05:51)  ramipril 1.25 mg oral capsule: 1 cap(s) orally once a day (20 Mar 2018 05:51)  senna oral tablet: 2 tab(s) orally once a day (at bedtime) (20 Mar 2018 05:51)  sodium bicarbonate 325 mg oral tablet: 1 tab(s) orally 2 times a day (20 Mar 2018 05:51)    MEDICATIONS  (STANDING):  aspirin enteric coated 81 milliGRAM(s) Oral daily  buDESOnide 160 MICROgram(s)/formoterol 4.5 MICROgram(s) Inhaler 2 Puff(s) Inhalation two times a day  dextrose 5%. 1000 milliLiter(s) (50 mL/Hr) IV Continuous <Continuous>  dextrose 50% Injectable 12.5 Gram(s) IV Push once  dextrose 50% Injectable 25 Gram(s) IV Push once  dextrose 50% Injectable 25 Gram(s) IV Push once  dorzolamide 2% Ophthalmic Solution 1 Drop(s) Both EYES three times a day  enoxaparin Injectable 60 milliGRAM(s) SubCutaneous daily  furosemide   Injectable 80 milliGRAM(s) IV Push every 12 hours  hydrALAZINE 50 milliGRAM(s) Oral every 8 hours  insulin glargine Injectable (LANTUS) 14 Unit(s) SubCutaneous every morning  insulin lispro (HumaLOG) corrective regimen sliding scale   SubCutaneous three times a day before meals  ranolazine 500 milliGRAM(s) Oral two times a day  senna 2 Tablet(s) Oral at bedtime  sodium bicarbonate 325 milliGRAM(s) Oral two times a day  ticagrelor 90 milliGRAM(s) Oral two times a day    MEDICATIONS  (PRN):  dextrose Gel 1 Dose(s) Oral once PRN Blood Glucose LESS THAN 70 milliGRAM(s)/deciliter  glucagon  Injectable 1 milliGRAM(s) IntraMuscular once PRN Glucose LESS THAN 70 milligrams/deciliter    Vital Signs Last 24 Hrs  T(C): 36.1 (20 Mar 2018 15:45), Max: 36.5 (20 Mar 2018 05:01)  T(F): 97 (20 Mar 2018 15:45), Max: 97.7 (20 Mar 2018 05:01)  HR: 66 (20 Mar 2018 15:45) (52 - 66)  BP: 138/74 (20 Mar 2018 15:45) (137/67 - 145/61)  BP(mean): --  RR: 16 (20 Mar 2018 15:45) (15 - 20)  SpO2: 96% (20 Mar 2018 12:58) (96% - 100%)    Daily Height in cm: 149.86 (20 Mar 2018 15:45)    Daily Weight in k.6 (20 Mar 2018 15:45)    CAPILLARY BLOOD GLUCOSE      POCT Blood Glucose.: 192 mg/dL (20 Mar 2018 16:12)  POCT Blood Glucose.: 203 mg/dL (20 Mar 2018 12:51)  POCT Blood Glucose.: 50 mg/dL (20 Mar 2018 11:28)    PHYSICAL EXAM:      T(C): 36.1 (18 @ 15:45), Max: 36.5 (18 @ 05:01)  HR: 66 (18 @ 15:45) (52 - 66)  BP: 138/74 (18 @ 15:45) (137/67 - 145/61)  RR: 16 (18 @ 15:45) (15 - 20)  SpO2: 96% (18 @ 12:58) (96% - 100%)  Wt(kg): --  Respiratory: clear anteriorly, decreased BS at bases with crackles  Cardiovascular: S1 S2  Gastrointestinal: soft NT ND +BS  Extremities:  1-2 edema                  135  |  101  |  77<H>  ----------------------------<  84  4.8   |  25  |  2.77<H>    Ca    9.0      20 Mar 2018 02:36  Mg     3.5         TPro  8.9<H>  /  Alb  3.2<L>  /  TBili  0.3  /  DBili  x   /  AST  33  /  ALT  62  /  AlkPhos  107                            10.1   2.99  )-----------( 211      ( 20 Mar 2018 02:36 )             30.4             Assessment and Plan    MAXIMINO CKD 3; pre renal azotemia, CRS, diuresis; r/o post renal factors ( higher PVR in past); paraprotein disease.  Considering hx of PAD, will r/o renal artery stenosis;   Hold ACE for now; repeat echo ( unless recently done as outpatient);  Paraprotein levels;  Warranted diuresis, will follow.

## 2018-03-20 NOTE — ED PROVIDER NOTE - PHYSICAL EXAMINATION
Gen: Alert, speaking in complete sentences  Head: NC, AT, EOMI, R eye w cataracts  ENT: normal hearing, patent oropharynx, MMM  Neck: supple, no tenderness/meningismus, FROM  Pulm: Bilateral rales at bases  CV: RRR, no M/R/G, +dist pulses  Abd: soft, NT/ND, +BS, no guarding/rebound tenderness  Mskel: s/p R midfoot amputation, +LE swelling  Skin: no rash  Neuro: no sensory/motor deficits

## 2018-03-20 NOTE — CONSULT NOTE ADULT - SUBJECTIVE AND OBJECTIVE BOX
85 YEAR OLD  FEMALE WITH 2-3 DAYS DYSPNEA , NO ANGINA; IN ER : CONGESTIVE HEART FAILURE , LASIX 40 MGIVP. TROPONIN 2 ELEVATED BNP  ECG BIFASICULAR BLOCK FIRST DEGREE AV BLOCK , BUN/CR: 77/2.7    DIABETIC   RENAL INSUFFICIENCY  ASHD; NEAR RECENT PCI  LV DYSFUNCTION  PVD WITH RIGHT TOE AMPUTATION; BLINDNESS    LAYS FLAT  NO JVD  BIBASILAR RALES  SOFT S1 NO APPRECIABLE MURMUR  RUB OR GALLOP  ABDOMEN SOFT, NONTENDER, NO DISTENSION  NO EDEMA, PERFUSED; NO CYANOSIS    IMPRESSION:    CONGESTIVE HEART FAILURE   RENAL INSUFFICIENCY  ISCHEMIA/MYOCARDIAL INJURY  DIABETES    DIURESIS  ANTIPLATELETS/HEPARIN  REVIEW PREVIOUS CATH REPORTS  NEPHROLOGY  RECOMMENDATIONS  PENDING HOSPITAL COURSE     DAYO COTTON MD, FACC 85 YEAR OLD  FEMALE WITH 2-3 DAYS DYSPNEA , NO ANGINA; IN ER : CONGESTIVE HEART FAILURE , LASIX 40 MGIVP. TROPONIN 2 ELEVATED BNP  ECG BIFASICULAR BLOCK FIRST DEGREE AV BLOCK , BUN/CR: 77/2.7 CHEST XRAY: CARDIOMEGALY CONGESTION	    DIABETIC   RENAL INSUFFICIENCY  ASHD; NEAR RECENT PCI  LV DYSFUNCTION  PVD WITH RIGHT TOE AMPUTATION; BLINDNESS    LAYS FLAT  NO JVD  BIBASILAR RALES  SOFT S1 NO APPRECIABLE MURMUR  RUB OR GALLOP  ABDOMEN SOFT, NONTENDER, NO DISTENSION  NO EDEMA, PERFUSED; NO CYANOSIS    IMPRESSION:    CONGESTIVE HEART FAILURE   RENAL INSUFFICIENCY  ISCHEMIA/MYOCARDIAL INJURY  DIABETES    DIURESIS  ANTIPLATELETS/HEPARIN  REVIEW PREVIOUS CATH REPORTS  NEPHROLOGY  RECOMMENDATIONS  PENDING HOSPITAL COURSE     DAYO COTTON MD, FACC 85 YEAR OLD  FEMALE WITH 2-3 DAYS DYSPNEA , NO ANGINA; IN ER : CONGESTIVE HEART FAILURE , LASIX 40 MGIVP. TROPONIN 2 ELEVATED BNP  ECG BIFASICULAR BLOCK FIRST DEGREE AV BLOCK , BUN/CR: 77/2.7 ANEMIC CHEST XRAY: CARDIOMEGALY CONGESTION	    DIABETIC   RENAL INSUFFICIENCY  ASHD; NEAR RECENT PCI  LV DYSFUNCTION  PVD WITH RIGHT TOE AMPUTATION; BLINDNESS    LAYS FLAT  NO JVD  BIBASILAR RALES  SOFT S1 NO APPRECIABLE MURMUR  RUB OR GALLOP  ABDOMEN SOFT, NONTENDER, NO DISTENSION  NO EDEMA, PERFUSED; NO CYANOSIS    IMPRESSION:    CONGESTIVE HEART FAILURE   RENAL INSUFFICIENCY  ISCHEMIA/MYOCARDIAL INJURY  DIABETES  ANEMIA     DIURESIS  ANTIPLATELETS/HEPARIN  REVIEW PREVIOUS CATH REPORTS  HOLD BETA BLOCKER GIVEN BI/TRI FASICULAR BLOCK  CONTINUE ISCHEMIC REGIMEN OTHERWISE  WILL HOLD ACE INHIBITOR FOR NOW AND CONTINUE HYDRALAZINE  ECHO  NEPHROLOGY  RECOMMENDATIONS  PENDING HOSPITAL COURSE     DAYO COTTON MD, FACC

## 2018-03-20 NOTE — H&P ADULT - NSHPLABSRESULTS_GEN_ALL_CORE
troponin 2.9  >>BNP  CXR CHF and effusion  EKG sinus bradycardia with PVC, !deg AV block, RBBB, LAFB

## 2018-03-20 NOTE — ED ADULT NURSE REASSESSMENT NOTE - NS ED NURSE REASSESS COMMENT FT1
fingerstick 50 mg/dl pt is awake and alert able to swallow Doctor Roby villalobos. Pt given juice and Lunch tray given. The repeat fingerstick will be done
Pt sleeping on the monitor and oxygen via nasal cannula ongoing. IV access left hand patent admitted pending bed assignment

## 2018-03-20 NOTE — ED PROVIDER NOTE - MEDICAL DECISION MAKING DETAILS
Pt w above dx.  Discussed [facility] CCU, pt transferred to [facility] Pt w above dx.  VSS, no BiPAP or intubation at this time.  Admitted for further eval/mgmt

## 2018-03-21 LAB
ANION GAP SERPL CALC-SCNC: 9 MMOL/L — SIGNIFICANT CHANGE UP (ref 5–17)
APPEARANCE UR: ABNORMAL
BACTERIA # UR AUTO: ABNORMAL
BILIRUB UR-MCNC: NEGATIVE — SIGNIFICANT CHANGE UP
BUN SERPL-MCNC: 79 MG/DL — HIGH (ref 7–23)
CALCIUM SERPL-MCNC: 9.2 MG/DL — SIGNIFICANT CHANGE UP (ref 8.5–10.1)
CHLORIDE SERPL-SCNC: 97 MMOL/L — SIGNIFICANT CHANGE UP (ref 96–108)
CO2 SERPL-SCNC: 28 MMOL/L — SIGNIFICANT CHANGE UP (ref 22–31)
COLOR SPEC: YELLOW — SIGNIFICANT CHANGE UP
CREAT ?TM UR-MCNC: 16 MG/DL — SIGNIFICANT CHANGE UP
CREAT SERPL-MCNC: 2.82 MG/DL — HIGH (ref 0.5–1.3)
DIFF PNL FLD: ABNORMAL
EPI CELLS # UR: SIGNIFICANT CHANGE UP
GLUCOSE BLDC GLUCOMTR-MCNC: 147 MG/DL — HIGH (ref 70–99)
GLUCOSE BLDC GLUCOMTR-MCNC: 147 MG/DL — HIGH (ref 70–99)
GLUCOSE BLDC GLUCOMTR-MCNC: 165 MG/DL — HIGH (ref 70–99)
GLUCOSE BLDC GLUCOMTR-MCNC: 253 MG/DL — HIGH (ref 70–99)
GLUCOSE SERPL-MCNC: 161 MG/DL — HIGH (ref 70–99)
GLUCOSE UR QL: NEGATIVE MG/DL — SIGNIFICANT CHANGE UP
HBA1C BLD-MCNC: 8.1 % — HIGH (ref 4–5.6)
HCT VFR BLD CALC: 30.5 % — LOW (ref 34.5–45)
HGB BLD-MCNC: 9.7 G/DL — LOW (ref 11.5–15.5)
IGA FLD-MCNC: 132 MG/DL — SIGNIFICANT CHANGE UP (ref 68–378)
IGG FLD-MCNC: 2850 MG/DL — HIGH (ref 694–1618)
IGM SERPL-MCNC: 32 MG/DL — LOW (ref 40–230)
KAPPA LC SER QL IFE: 17.2 MG/DL — HIGH (ref 0.33–1.94)
KAPPA/LAMBDA FREE LIGHT CHAIN RATIO, SERUM: 9.2 RATIO — HIGH (ref 0.26–1.65)
KETONES UR-MCNC: NEGATIVE — SIGNIFICANT CHANGE UP
LAMBDA LC SER QL IFE: 1.87 MG/DL — SIGNIFICANT CHANGE UP (ref 0.57–2.63)
LEUKOCYTE ESTERASE UR-ACNC: ABNORMAL
MAGNESIUM SERPL-MCNC: 3.1 MG/DL — HIGH (ref 1.6–2.6)
MCHC RBC-ENTMCNC: 28.1 PG — SIGNIFICANT CHANGE UP (ref 27–34)
MCHC RBC-ENTMCNC: 31.8 GM/DL — LOW (ref 32–36)
MCV RBC AUTO: 88.4 FL — SIGNIFICANT CHANGE UP (ref 80–100)
NITRITE UR-MCNC: POSITIVE
NRBC # BLD: 0 /100 WBCS — SIGNIFICANT CHANGE UP (ref 0–0)
PH UR: 8 — SIGNIFICANT CHANGE UP (ref 5–8)
PLATELET # BLD AUTO: 195 K/UL — SIGNIFICANT CHANGE UP (ref 150–400)
POTASSIUM SERPL-MCNC: 4.7 MMOL/L — SIGNIFICANT CHANGE UP (ref 3.5–5.3)
POTASSIUM SERPL-SCNC: 4.7 MMOL/L — SIGNIFICANT CHANGE UP (ref 3.5–5.3)
PROT ?TM UR-MCNC: 21 MG/DL — HIGH (ref 0–12)
PROT ?TM UR-MCNC: 36 MG/DL — HIGH (ref 0–12)
PROT UR-MCNC: 30 MG/DL
PROT/CREAT UR-RTO: 1.3 RATIO — HIGH (ref 0–0.2)
RBC # BLD: 3.45 M/UL — LOW (ref 3.8–5.2)
RBC # FLD: 13.7 % — SIGNIFICANT CHANGE UP (ref 10.3–14.5)
RBC CASTS # UR COMP ASSIST: ABNORMAL /HPF (ref 0–4)
SODIUM SERPL-SCNC: 134 MMOL/L — LOW (ref 135–145)
SP GR SPEC: 1.01 — SIGNIFICANT CHANGE UP (ref 1.01–1.02)
TROPONIN I SERPL-MCNC: 3.34 NG/ML — HIGH (ref 0.01–0.04)
UROBILINOGEN FLD QL: NEGATIVE MG/DL — SIGNIFICANT CHANGE UP
WBC # BLD: 2.61 K/UL — LOW (ref 3.8–10.5)
WBC # FLD AUTO: 2.61 K/UL — LOW (ref 3.8–10.5)
WBC UR QL: SIGNIFICANT CHANGE UP /HPF (ref 0–5)

## 2018-03-21 PROCEDURE — 93010 ELECTROCARDIOGRAM REPORT: CPT

## 2018-03-21 PROCEDURE — 93975 VASCULAR STUDY: CPT | Mod: 26

## 2018-03-21 RX ORDER — FUROSEMIDE 40 MG
80 TABLET ORAL
Qty: 0 | Refills: 0 | Status: DISCONTINUED | OUTPATIENT
Start: 2018-03-21 | End: 2018-03-22

## 2018-03-21 RX ORDER — METOPROLOL TARTRATE 50 MG
25 TABLET ORAL DAILY
Qty: 0 | Refills: 0 | Status: DISCONTINUED | OUTPATIENT
Start: 2018-03-21 | End: 2018-03-27

## 2018-03-21 RX ORDER — PANTOPRAZOLE SODIUM 20 MG/1
40 TABLET, DELAYED RELEASE ORAL
Qty: 0 | Refills: 0 | Status: DISCONTINUED | OUTPATIENT
Start: 2018-03-21 | End: 2018-03-25

## 2018-03-21 RX ADMIN — Medication 80 MILLIGRAM(S): at 17:30

## 2018-03-21 RX ADMIN — Medication 325 MILLIGRAM(S): at 05:50

## 2018-03-21 RX ADMIN — Medication 6: at 17:33

## 2018-03-21 RX ADMIN — Medication 50 MILLIGRAM(S): at 21:51

## 2018-03-21 RX ADMIN — DORZOLAMIDE HYDROCHLORIDE 1 DROP(S): 20 SOLUTION/ DROPS OPHTHALMIC at 17:30

## 2018-03-21 RX ADMIN — DORZOLAMIDE HYDROCHLORIDE 1 DROP(S): 20 SOLUTION/ DROPS OPHTHALMIC at 05:51

## 2018-03-21 RX ADMIN — SENNA PLUS 2 TABLET(S): 8.6 TABLET ORAL at 21:51

## 2018-03-21 RX ADMIN — RANOLAZINE 500 MILLIGRAM(S): 500 TABLET, FILM COATED, EXTENDED RELEASE ORAL at 17:31

## 2018-03-21 RX ADMIN — Medication 50 MILLIGRAM(S): at 17:30

## 2018-03-21 RX ADMIN — TICAGRELOR 90 MILLIGRAM(S): 90 TABLET ORAL at 05:52

## 2018-03-21 RX ADMIN — Medication 50 MILLIGRAM(S): at 05:51

## 2018-03-21 RX ADMIN — ENOXAPARIN SODIUM 60 MILLIGRAM(S): 100 INJECTION SUBCUTANEOUS at 12:00

## 2018-03-21 RX ADMIN — Medication 2: at 12:02

## 2018-03-21 RX ADMIN — BUDESONIDE AND FORMOTEROL FUMARATE DIHYDRATE 2 PUFF(S): 160; 4.5 AEROSOL RESPIRATORY (INHALATION) at 05:51

## 2018-03-21 RX ADMIN — TICAGRELOR 90 MILLIGRAM(S): 90 TABLET ORAL at 17:30

## 2018-03-21 RX ADMIN — Medication 81 MILLIGRAM(S): at 12:01

## 2018-03-21 RX ADMIN — Medication 325 MILLIGRAM(S): at 17:31

## 2018-03-21 RX ADMIN — Medication 80 MILLIGRAM(S): at 05:51

## 2018-03-21 RX ADMIN — RANOLAZINE 500 MILLIGRAM(S): 500 TABLET, FILM COATED, EXTENDED RELEASE ORAL at 05:51

## 2018-03-21 NOTE — PROGRESS NOTE ADULT - SUBJECTIVE AND OBJECTIVE BOX
Patient is a 85y old  Female who presents with a chief complaint of SOB (20 Mar 2018 15:43)      INTERVAL HPI/OVERNIGHT EVENTS: "I feel much better"    MEDICATIONS  (STANDING):  aspirin enteric coated 81 milliGRAM(s) Oral daily  buDESOnide 160 MICROgram(s)/formoterol 4.5 MICROgram(s) Inhaler 2 Puff(s) Inhalation two times a day  dextrose 5%. 1000 milliLiter(s) (50 mL/Hr) IV Continuous <Continuous>  dextrose 50% Injectable 12.5 Gram(s) IV Push once  dextrose 50% Injectable 25 Gram(s) IV Push once  dextrose 50% Injectable 25 Gram(s) IV Push once  dorzolamide 2% Ophthalmic Solution 1 Drop(s) Both EYES three times a day  enoxaparin Injectable 60 milliGRAM(s) SubCutaneous daily  furosemide    Tablet 80 milliGRAM(s) Oral two times a day  hydrALAZINE 50 milliGRAM(s) Oral every 8 hours  insulin glargine Injectable (LANTUS) 14 Unit(s) SubCutaneous every morning  insulin lispro (HumaLOG) corrective regimen sliding scale   SubCutaneous three times a day before meals  pantoprazole    Tablet 40 milliGRAM(s) Oral before breakfast  ranolazine 500 milliGRAM(s) Oral two times a day  senna 2 Tablet(s) Oral at bedtime  sodium bicarbonate 325 milliGRAM(s) Oral two times a day  ticagrelor 90 milliGRAM(s) Oral two times a day    MEDICATIONS  (PRN):  dextrose Gel 1 Dose(s) Oral once PRN Blood Glucose LESS THAN 70 milliGRAM(s)/deciliter  glucagon  Injectable 1 milliGRAM(s) IntraMuscular once PRN Glucose LESS THAN 70 milligrams/deciliter      Allergies    codeine (Other)    Intolerances        REVIEW OF SYSTEMS:  sitting comfortable in bed      HEENT - wnl  RESPIRATORY: No cough, wheezing, chills or hemoptysis; No shortness of breath  CARDIOVASCULAR: No chest pain, palpitations, dizziness, or leg swelling  GASTROINTESTINAL: No abdominal or epigastric pain. No nausea, vomiting, or hematemesis; No diarrhea or constipation. No melena or hematochezia.  GENITOURINARY: No dysuria, frequency, hematuria, or incontinence  SKIN: No itching, burning, rashes, or lesions   MUSCULOSKELETAL: No joint pain or swelling; No muscle, back, or extremity pain  PSYCHIATRIC: No depression, anxiety, mood swings, or difficulty sleeping        Vital Signs Last 24 Hrs  T(C): 36 (21 Mar 2018 05:36), Max: 36.5 (20 Mar 2018 09:17)  T(F): 96.8 (21 Mar 2018 05:36), Max: 97.7 (20 Mar 2018 09:17)  HR: 66 (21 Mar 2018 05:36) (52 - 71)  BP: 112/48 (21 Mar 2018 05:36) (112/48 - 145/61)  BP(mean): --  RR: 17 (21 Mar 2018 05:36) (15 - 20)  SpO2: 100% (21 Mar 2018 05:36) (93% - 100%)    PHYSICAL EXAM:  general       HEENT wnl  CHEST/LUNG: Clear to percussion bilaterally;< BS at bases  HEART: Regular rate and rhythm; No murmurs, rubs, or gallops  ABDOMEN: Soft, Nontender, Nondistended; Bowel sounds present  EXTREMITIES:  2+ Peripheral Pulses, No clubbing, cyanosis, or edema  LYMPH: No lymphadenopathy noted  SKIN: No rashes or lesions    LABS:                        9.7    2.61  )-----------( 195      ( 21 Mar 2018 07:03 )             30.5     03-21    134<L>  |  97  |  79<H>  ----------------------------<  161<H>  4.7   |  28  |  2.82<H>    Ca    9.2      21 Mar 2018 07:03  Mg     3.1     03-21    TPro  8.9<H>  /  Alb  3.2<L>  /  TBili  0.3  /  DBili  x   /  AST  33  /  ALT  62  /  AlkPhos  107  03-20    PT/INR - ( 20 Mar 2018 06:41 )   PT: 12.3 sec;   INR: 1.13 ratio         PTT - ( 20 Mar 2018 06:41 )  PTT:31.0 sec    CAPILLARY BLOOD GLUCOSE      POCT Blood Glucose.: 147 mg/dL (21 Mar 2018 08:11)  POCT Blood Glucose.: 210 mg/dL (20 Mar 2018 20:37)  POCT Blood Glucose.: 192 mg/dL (20 Mar 2018 16:12)  POCT Blood Glucose.: 203 mg/dL (20 Mar 2018 12:51)  POCT Blood Glucose.: 50 mg/dL (20 Mar 2018 11:28)      CARDIAC MARKERS ( 21 Mar 2018 03:56 )  3.340 ng/mL / x     / x     / x     / x      CARDIAC MARKERS ( 20 Mar 2018 18:25 )  2.690 ng/mL / x     / x     / x     / x      CARDIAC MARKERS ( 20 Mar 2018 10:28 )  2.890 ng/mL / x     / x     / x     / x      CARDIAC MARKERS ( 20 Mar 2018 02:36 )  2.910 ng/mL / x     / 70 U/L / x     / 1.4 ng/mL        Cholesterol, Serum: 163 mg/dL (03-20 @ 12:36)  HDL Cholesterol, Serum: 42 mg/dL (03-20 @ 12:36)  Triglycerides, Serum: 69 mg/dL (03-20 @ 12:36)      RADIOLOGY & ADDITIONAL TESTS:    Imaging Personally Reviewed:  [y ] YES  [ ] NO    Consultant(s) Notes Reviewed:  [y ] YES  [ ] NO    Care Discussed with Consultants/Other Providers [ ] YES  [ ] NO    PROBLEMS:  HEART FAILURE, ELEVATED TROPONIN LEVEL  SHORTNESS OF BREATH; UNABLE TO AMBULATE  Acute MI  trifascicular HB  HyperMg - bradycardia  CKD 3  T2DM      Care discussed with family,         [  ]   yes  [  ]  No    imp:    stable[ ]    unstable[  ]     improving [  v ]       unchanged  [  ]                Plans:  change diuretic to po 80mg bid  ECHO P  Discussed c Dr Mendoza

## 2018-03-21 NOTE — PROGRESS NOTE ADULT - SUBJECTIVE AND OBJECTIVE BOX
Patient feels well no new complaints today; less LIZ;    MEDICATIONS  (STANDING):  aspirin enteric coated 81 milliGRAM(s) Oral daily  buDESOnide 160 MICROgram(s)/formoterol 4.5 MICROgram(s) Inhaler 2 Puff(s) Inhalation two times a day  dextrose 5%. 1000 milliLiter(s) (50 mL/Hr) IV Continuous <Continuous>  dextrose 50% Injectable 12.5 Gram(s) IV Push once  dextrose 50% Injectable 25 Gram(s) IV Push once  dextrose 50% Injectable 25 Gram(s) IV Push once  dorzolamide 2% Ophthalmic Solution 1 Drop(s) Both EYES three times a day  enoxaparin Injectable 60 milliGRAM(s) SubCutaneous daily  furosemide    Tablet 80 milliGRAM(s) Oral two times a day  hydrALAZINE 50 milliGRAM(s) Oral every 8 hours  insulin glargine Injectable (LANTUS) 14 Unit(s) SubCutaneous every morning  insulin lispro (HumaLOG) corrective regimen sliding scale   SubCutaneous three times a day before meals  metoprolol succinate ER 25 milliGRAM(s) Oral daily  pantoprazole    Tablet 40 milliGRAM(s) Oral before breakfast  ranolazine 500 milliGRAM(s) Oral two times a day  senna 2 Tablet(s) Oral at bedtime  sodium bicarbonate 325 milliGRAM(s) Oral two times a day  ticagrelor 90 milliGRAM(s) Oral two times a day    MEDICATIONS  (PRN):  dextrose Gel 1 Dose(s) Oral once PRN Blood Glucose LESS THAN 70 milliGRAM(s)/deciliter  glucagon  Injectable 1 milliGRAM(s) IntraMuscular once PRN Glucose LESS THAN 70 milligrams/deciliter      03-20-18 @ 07:01  -  03-21-18 @ 07:00  --------------------------------------------------------  IN: 420 mL / OUT: 2 mL / NET: 418 mL      PHYSICAL EXAM:      T(C): 35.2 (03-21-18 @ 12:21), Max: 36.2 (03-21-18 @ 00:32)  HR: 74 (03-21-18 @ 12:21) (66 - 74)  BP: 136/64 (03-21-18 @ 12:21) (112/48 - 138/74)  RR: 16 (03-21-18 @ 12:21) (16 - 17)  SpO2: 96% (03-21-18 @ 12:21) (93% - 100%)  Wt(kg): --  Respiratory: clear anteriorly, decreased BS at bases  Cardiovascular: S1 S2  Gastrointestinal: soft NT ND +BS  Extremities:   1 edema                                    9.7    2.61  )-----------( 195      ( 21 Mar 2018 07:03 )             30.5     03-21    134<L>  |  97  |  79<H>  ----------------------------<  161<H>  4.7   |  28  |  2.82<H>    Ca    9.2      21 Mar 2018 07:03  Mg     3.1     03-21    TPro  8.9<H>  /  Alb  3.2<L>  /  TBili  0.3  /  DBili  x   /  AST  33  /  ALT  62  /  AlkPhos  107  03-20      LIVER FUNCTIONS - ( 20 Mar 2018 02:36 )  Alb: 3.2 g/dL / Pro: 8.9 gm/dL / ALK PHOS: 107 U/L / ALT: 62 U/L / AST: 33 U/L / GGT: x             Assessment and Plan:    MAXIMINO CKD 3; pre renal azotemia, CRS, diuresis;  paraprotein disease.  Considering hx of PAD, but NO EVIDENCE of renal artery stenosis on doppler.  Hold ACE for now; repeat echo ( unless recently done as outpatient);  Paraprotein levels;  Warranted diuresis, will follow. Patient feels well no new complaints today; less LIZ;    MEDICATIONS  (STANDING):  aspirin enteric coated 81 milliGRAM(s) Oral daily  buDESOnide 160 MICROgram(s)/formoterol 4.5 MICROgram(s) Inhaler 2 Puff(s) Inhalation two times a day  dextrose 5%. 1000 milliLiter(s) (50 mL/Hr) IV Continuous <Continuous>  dextrose 50% Injectable 12.5 Gram(s) IV Push once  dextrose 50% Injectable 25 Gram(s) IV Push once  dextrose 50% Injectable 25 Gram(s) IV Push once  dorzolamide 2% Ophthalmic Solution 1 Drop(s) Both EYES three times a day  enoxaparin Injectable 60 milliGRAM(s) SubCutaneous daily  furosemide    Tablet 80 milliGRAM(s) Oral two times a day  hydrALAZINE 50 milliGRAM(s) Oral every 8 hours  insulin glargine Injectable (LANTUS) 14 Unit(s) SubCutaneous every morning  insulin lispro (HumaLOG) corrective regimen sliding scale   SubCutaneous three times a day before meals  metoprolol succinate ER 25 milliGRAM(s) Oral daily  pantoprazole    Tablet 40 milliGRAM(s) Oral before breakfast  ranolazine 500 milliGRAM(s) Oral two times a day  senna 2 Tablet(s) Oral at bedtime  sodium bicarbonate 325 milliGRAM(s) Oral two times a day  ticagrelor 90 milliGRAM(s) Oral two times a day    MEDICATIONS  (PRN):  dextrose Gel 1 Dose(s) Oral once PRN Blood Glucose LESS THAN 70 milliGRAM(s)/deciliter  glucagon  Injectable 1 milliGRAM(s) IntraMuscular once PRN Glucose LESS THAN 70 milligrams/deciliter      03-20-18 @ 07:01  -  03-21-18 @ 07:00  --------------------------------------------------------  IN: 420 mL / OUT: 2 mL / NET: 418 mL      PHYSICAL EXAM:      T(C): 35.2 (03-21-18 @ 12:21), Max: 36.2 (03-21-18 @ 00:32)  HR: 74 (03-21-18 @ 12:21) (66 - 74)  BP: 136/64 (03-21-18 @ 12:21) (112/48 - 138/74)  RR: 16 (03-21-18 @ 12:21) (16 - 17)  SpO2: 96% (03-21-18 @ 12:21) (93% - 100%)  Wt(kg): --  Respiratory: clear anteriorly, decreased BS at bases  Cardiovascular: S1 S2  Gastrointestinal: soft NT ND +BS  Extremities:   1 edema                                    9.7    2.61  )-----------( 195      ( 21 Mar 2018 07:03 )             30.5     03-21    134<L>  |  97  |  79<H>  ----------------------------<  161<H>  4.7   |  28  |  2.82<H>    Ca    9.2      21 Mar 2018 07:03  Mg     3.1     03-21    TPro  8.9<H>  /  Alb  3.2<L>  /  TBili  0.3  /  DBili  x   /  AST  33  /  ALT  62  /  AlkPhos  107  03-20      LIVER FUNCTIONS - ( 20 Mar 2018 02:36 )  Alb: 3.2 g/dL / Pro: 8.9 gm/dL / ALK PHOS: 107 U/L / ALT: 62 U/L / AST: 33 U/L / GGT: x             Assessment and Plan:    MAXIMINO CKD 3; pre renal azotemia, CRS, diuresis;  r/o paraprotein disease.  Considering hx of PAD, but NO EVIDENCE of renal artery stenosis on doppler.  Hold ACE for now; warranted diuresis, will follow.

## 2018-03-21 NOTE — PROGRESS NOTE ADULT - SUBJECTIVE AND OBJECTIVE BOX
VOIDING  FEELING BETTER  NO ANGINA  RENAL NOTED  TROPONINS REMAIN ELEVATED  ECG NO CHANGE; TRIFASICULAR BLOCK IS OLD AN UNCHANGED  TELEMETRY: NORMAL SINUS RHYTHM WITH RATES IN 60'S-70'S OFF OF BETA BLOCKER  CATH REVIEWED: 8/2017; UNSTABLE ANGINA/NSTEMI/HF INSTENT RESTENOSIS TO 1ST DIAGONAL WITH REPEAT PCI/ROSS (SUPPLIES A LARGE TERRITORY  INCLUDING MAJORITY OF CIRCUMFLEX DISTRIBUTION; OTHER VESSELS: UNREMARKABLE  ECHO FROM TODAY REVIEWED: LV DYSFUNCTION (PERSONAL REVIEW)    NO JVD  GOOD AIR ENTRY  SOFT S1  ABDOMEN SOFT, NONTENDER, NO DISTENSION  MILD EDEMA    IMPRESSION:    NSTEMI  CONGESTIVE HEART FAILURE   RENAL INSUFFICIENCY  DIABETES  CONDUCTION DISEASE    OPTIONS DISCUSSED ; CONSIDER REPEAT CATH THOUGH HIGH RISK FOR RENAL FAILURE REQUIRING DIALYSIS WHICH PATIENT WANTS TO AVOID; NO PRESENT INDICATION FOR PACEMAKER FROM CONDUCTION STANDPOINT HOWEVER THE BENEFIT OF BETA BLOCKER /AV JAYLA AGENTS FOR MANAGEMENT OF CONGESTIVE HEART FAILURE AND ISCHEMIA MAY MAKE THIS AN ATTRACTIVE OPTION; I DISCUSSED FRANKLY END OF LIFE ISSUES AND HOW HER PROGNOSIS LONG TERM IS LIMITED. FOR NOW WILL MANAGE MEDICALLY AS BEST (TOUGH LIMITEDLY) AS POSSIBLE. ON ORAL DIURETICS;  RESUME LOW DOSE BETA BLOCKER AND OBSERVE TELEMETRY; HOLDING ACE INHIBITOR FOR NOW.    DAYO COTTON MD, FACC

## 2018-03-22 LAB
ANION GAP SERPL CALC-SCNC: 10 MMOL/L — SIGNIFICANT CHANGE UP (ref 5–17)
BUN SERPL-MCNC: 76 MG/DL — HIGH (ref 7–23)
CALCIUM SERPL-MCNC: 9.6 MG/DL — SIGNIFICANT CHANGE UP (ref 8.5–10.1)
CHLORIDE SERPL-SCNC: 98 MMOL/L — SIGNIFICANT CHANGE UP (ref 96–108)
CO2 SERPL-SCNC: 27 MMOL/L — SIGNIFICANT CHANGE UP (ref 22–31)
CREAT SERPL-MCNC: 3.21 MG/DL — HIGH (ref 0.5–1.3)
ERYTHROCYTE [SEDIMENTATION RATE] IN BLOOD: 88 MM/HR — HIGH (ref 0–20)
GLUCOSE BLDC GLUCOMTR-MCNC: 122 MG/DL — HIGH (ref 70–99)
GLUCOSE BLDC GLUCOMTR-MCNC: 130 MG/DL — HIGH (ref 70–99)
GLUCOSE BLDC GLUCOMTR-MCNC: 130 MG/DL — HIGH (ref 70–99)
GLUCOSE BLDC GLUCOMTR-MCNC: 217 MG/DL — HIGH (ref 70–99)
GLUCOSE SERPL-MCNC: 125 MG/DL — HIGH (ref 70–99)
HCT VFR BLD CALC: 31.6 % — LOW (ref 34.5–45)
HGB BLD-MCNC: 10.1 G/DL — LOW (ref 11.5–15.5)
MCHC RBC-ENTMCNC: 28.1 PG — SIGNIFICANT CHANGE UP (ref 27–34)
MCHC RBC-ENTMCNC: 32 GM/DL — SIGNIFICANT CHANGE UP (ref 32–36)
MCV RBC AUTO: 88 FL — SIGNIFICANT CHANGE UP (ref 80–100)
NRBC # BLD: 0 /100 WBCS — SIGNIFICANT CHANGE UP (ref 0–0)
PLATELET # BLD AUTO: 220 K/UL — SIGNIFICANT CHANGE UP (ref 150–400)
POTASSIUM SERPL-MCNC: 4.7 MMOL/L — SIGNIFICANT CHANGE UP (ref 3.5–5.3)
POTASSIUM SERPL-SCNC: 4.7 MMOL/L — SIGNIFICANT CHANGE UP (ref 3.5–5.3)
RBC # BLD: 3.59 M/UL — LOW (ref 3.8–5.2)
RBC # FLD: 13.6 % — SIGNIFICANT CHANGE UP (ref 10.3–14.5)
SODIUM SERPL-SCNC: 135 MMOL/L — SIGNIFICANT CHANGE UP (ref 135–145)
URATE SERPL-MCNC: 13.9 MG/DL — HIGH (ref 2.5–7)
WBC # BLD: 2.98 K/UL — LOW (ref 3.8–10.5)
WBC # FLD AUTO: 2.98 K/UL — LOW (ref 3.8–10.5)

## 2018-03-22 RX ORDER — FUROSEMIDE 40 MG
60 TABLET ORAL
Qty: 0 | Refills: 0 | Status: DISCONTINUED | OUTPATIENT
Start: 2018-03-22 | End: 2018-03-23

## 2018-03-22 RX ADMIN — RANOLAZINE 500 MILLIGRAM(S): 500 TABLET, FILM COATED, EXTENDED RELEASE ORAL at 06:15

## 2018-03-22 RX ADMIN — ENOXAPARIN SODIUM 60 MILLIGRAM(S): 100 INJECTION SUBCUTANEOUS at 12:19

## 2018-03-22 RX ADMIN — TICAGRELOR 90 MILLIGRAM(S): 90 TABLET ORAL at 06:15

## 2018-03-22 RX ADMIN — Medication 325 MILLIGRAM(S): at 17:24

## 2018-03-22 RX ADMIN — PANTOPRAZOLE SODIUM 40 MILLIGRAM(S): 20 TABLET, DELAYED RELEASE ORAL at 06:15

## 2018-03-22 RX ADMIN — INSULIN GLARGINE 14 UNIT(S): 100 INJECTION, SOLUTION SUBCUTANEOUS at 11:36

## 2018-03-22 RX ADMIN — Medication 50 MILLIGRAM(S): at 22:33

## 2018-03-22 RX ADMIN — BUDESONIDE AND FORMOTEROL FUMARATE DIHYDRATE 2 PUFF(S): 160; 4.5 AEROSOL RESPIRATORY (INHALATION) at 17:26

## 2018-03-22 RX ADMIN — Medication 25 MILLIGRAM(S): at 15:08

## 2018-03-22 RX ADMIN — Medication 50 MILLIGRAM(S): at 06:15

## 2018-03-22 RX ADMIN — Medication 60 MILLIGRAM(S): at 17:27

## 2018-03-22 RX ADMIN — Medication 50 MILLIGRAM(S): at 15:08

## 2018-03-22 RX ADMIN — Medication 325 MILLIGRAM(S): at 06:14

## 2018-03-22 RX ADMIN — Medication 81 MILLIGRAM(S): at 12:19

## 2018-03-22 RX ADMIN — Medication 80 MILLIGRAM(S): at 06:15

## 2018-03-22 RX ADMIN — RANOLAZINE 500 MILLIGRAM(S): 500 TABLET, FILM COATED, EXTENDED RELEASE ORAL at 17:25

## 2018-03-22 RX ADMIN — DORZOLAMIDE HYDROCHLORIDE 1 DROP(S): 20 SOLUTION/ DROPS OPHTHALMIC at 15:07

## 2018-03-22 RX ADMIN — TICAGRELOR 90 MILLIGRAM(S): 90 TABLET ORAL at 17:25

## 2018-03-22 RX ADMIN — Medication 4: at 12:21

## 2018-03-22 NOTE — PROGRESS NOTE ADULT - SUBJECTIVE AND OBJECTIVE BOX
WORSENED RENAL FUNCTION  TOLERATING BETA BLOCKER; TELEMETRY: NORMAL SINUS RHYTHM NO BLOCK;  ECHO NORMAL LVEF  NO JVD  CLEAR  SOFT S1  ABDOMEN SOFT, NONTENDER, NO DISTENSION   NO CLUBBING CYANOSIS OR EDEMA    IMPRESSION:    NSTEMI  chronic renal insufficiency   DIABETES  MELLITUS  RECENT PCI  NORMAL LVEF    CONTINUE BETA BLOCKER AND MEDICAL THERAPY FOR NOW. IF/WHEN RENAL FUNCTION ALLOWS, I FAVOR REPEAT CATH    DAYO COTTON MD, FACC

## 2018-03-22 NOTE — PROGRESS NOTE ADULT - SUBJECTIVE AND OBJECTIVE BOX
Patient feels well no new complaints today. Breathing and LIZ improved;       MEDICATIONS  (STANDING):  aspirin enteric coated 81 milliGRAM(s) Oral daily  buDESOnide 160 MICROgram(s)/formoterol 4.5 MICROgram(s) Inhaler 2 Puff(s) Inhalation two times a day  dextrose 5%. 1000 milliLiter(s) (50 mL/Hr) IV Continuous <Continuous>  dextrose 50% Injectable 12.5 Gram(s) IV Push once  dextrose 50% Injectable 25 Gram(s) IV Push once  dextrose 50% Injectable 25 Gram(s) IV Push once  dorzolamide 2% Ophthalmic Solution 1 Drop(s) Both EYES three times a day  enoxaparin Injectable 60 milliGRAM(s) SubCutaneous daily  furosemide    Tablet 60 milliGRAM(s) Oral two times a day  hydrALAZINE 50 milliGRAM(s) Oral every 8 hours  insulin glargine Injectable (LANTUS) 14 Unit(s) SubCutaneous every morning  insulin lispro (HumaLOG) corrective regimen sliding scale   SubCutaneous three times a day before meals  metoprolol succinate ER 25 milliGRAM(s) Oral daily  pantoprazole    Tablet 40 milliGRAM(s) Oral before breakfast  ranolazine 500 milliGRAM(s) Oral two times a day  senna 2 Tablet(s) Oral at bedtime  sodium bicarbonate 325 milliGRAM(s) Oral two times a day  ticagrelor 90 milliGRAM(s) Oral two times a day    MEDICATIONS  (PRN):  dextrose Gel 1 Dose(s) Oral once PRN Blood Glucose LESS THAN 70 milliGRAM(s)/deciliter  glucagon  Injectable 1 milliGRAM(s) IntraMuscular once PRN Glucose LESS THAN 70 milligrams/deciliter      03-21-18 @ 07:01  -  03-22-18 @ 07:00  --------------------------------------------------------  IN: 240 mL / OUT: 0 mL / NET: 240 mL      PHYSICAL EXAM:      T(C): 36.6 (03-22-18 @ 06:18), Max: 36.6 (03-22-18 @ 00:32)  HR: 74 (03-22-18 @ 06:18) (68 - 76)  BP: 124/48 (03-22-18 @ 06:18) (124/48 - 136/64)  RR: 17 (03-22-18 @ 06:18) (16 - 17)  SpO2: 100% (03-22-18 @ 06:18) (96% - 100%)  Wt(kg): --  Respiratory: clear anteriorly, decreased BS at bases  Cardiovascular: S1 S2  Gastrointestinal: soft NT ND +BS  Extremities:   1 edema                                    10.1   2.98  )-----------( 220      ( 22 Mar 2018 06:18 )             31.6     03-22    135  |  98  |  76<H>  ----------------------------<  125<H>  4.7   |  27  |  3.21<H>    Ca    9.6      22 Mar 2018 06:18  Mg     3.1     03-21            Assessment and Plan:    MAXIMINO CKD 3-4; CRS; r/o paraprotein disease;  Agree with downward adjustment in diuretic rx;  Hem/onc evaluation if paraprotein still present;

## 2018-03-22 NOTE — PROGRESS NOTE ADULT - SUBJECTIVE AND OBJECTIVE BOX
Patient is a 85y old  Female who presents with a chief complaint of SOB (20 Mar 2018 15:43)      INTERVAL HPI/OVERNIGHT EVENTS: b/l Legs pain, weakness    MEDICATIONS  (STANDING):  aspirin enteric coated 81 milliGRAM(s) Oral daily  buDESOnide 160 MICROgram(s)/formoterol 4.5 MICROgram(s) Inhaler 2 Puff(s) Inhalation two times a day  dextrose 5%. 1000 milliLiter(s) (50 mL/Hr) IV Continuous <Continuous>  dextrose 50% Injectable 12.5 Gram(s) IV Push once  dextrose 50% Injectable 25 Gram(s) IV Push once  dextrose 50% Injectable 25 Gram(s) IV Push once  dorzolamide 2% Ophthalmic Solution 1 Drop(s) Both EYES three times a day  enoxaparin Injectable 60 milliGRAM(s) SubCutaneous daily  furosemide    Tablet 80 milliGRAM(s) Oral two times a day  hydrALAZINE 50 milliGRAM(s) Oral every 8 hours  insulin glargine Injectable (LANTUS) 14 Unit(s) SubCutaneous every morning  insulin lispro (HumaLOG) corrective regimen sliding scale   SubCutaneous three times a day before meals  metoprolol succinate ER 25 milliGRAM(s) Oral daily  pantoprazole    Tablet 40 milliGRAM(s) Oral before breakfast  ranolazine 500 milliGRAM(s) Oral two times a day  senna 2 Tablet(s) Oral at bedtime  sodium bicarbonate 325 milliGRAM(s) Oral two times a day  ticagrelor 90 milliGRAM(s) Oral two times a day    MEDICATIONS  (PRN):  dextrose Gel 1 Dose(s) Oral once PRN Blood Glucose LESS THAN 70 milliGRAM(s)/deciliter  glucagon  Injectable 1 milliGRAM(s) IntraMuscular once PRN Glucose LESS THAN 70 milligrams/deciliter      Allergies    codeine (Other)    Intolerances        REVIEW OF SYSTEMS:        HEENT - wnl  RESPIRATORY: No cough, wheezing, chills or hemoptysis; No shortness of breath  CARDIOVASCULAR: No chest pain, palpitations, dizziness, or leg swelling  GASTROINTESTINAL: No abdominal or epigastric pain. No nausea, vomiting, or hematemesis; No diarrhea or constipation. No melena or hematochezia.  GENITOURINARY: No dysuria, frequency, hematuria, or incontinence  SKIN: No itching, burning, rashes, or lesions   MUSCULOSKELETAL: No joint pain or swelling; No muscle, back, or extremity pain  PSYCHIATRIC: No depression, anxiety, mood swings, or difficulty sleeping        Vital Signs Last 24 Hrs  T(C): 36.6 (22 Mar 2018 06:18), Max: 36.6 (22 Mar 2018 00:32)  T(F): 97.9 (22 Mar 2018 06:18), Max: 97.9 (22 Mar 2018 00:32)  HR: 74 (22 Mar 2018 06:18) (68 - 76)  BP: 124/48 (22 Mar 2018 06:18) (124/48 - 136/64)  BP(mean): --  RR: 17 (22 Mar 2018 06:18) (16 - 17)  SpO2: 100% (22 Mar 2018 06:18) (96% - 100%)    PHYSICAL EXAM:  general       HEENT wnl  CHEST/LUNG: Clear to percussion bilaterally; No rales, rhonchi, wheezing, or rubs  HEART: Regular rate and rhythm; No murmurs, rubs, or gallops  ABDOMEN: Soft, Nontender, Nondistended; Bowel sounds present  EXTREMITIES:  2+ Peripheral Pulses, No clubbing, cyanosis, or edema  LYMPH: No lymphadenopathy noted  SKIN: No rashes or lesions    LABS:                        10.1   2.98  )-----------( 220      ( 22 Mar 2018 06:18 )             31.6     03-22    135  |  98  |  76<H>  ----------------------------<  125<H>  4.7   |  27  |  3.21<H>    Ca    9.6      22 Mar 2018 06:18  Mg     3.1     03-21        Urinalysis Basic - ( 21 Mar 2018 11:34 )    Color: Yellow / Appearance: very cloudy / S.010 / pH: x  Gluc: x / Ketone: Negative  / Bili: Negative / Urobili: Negative mg/dL   Blood: x / Protein: 30 mg/dL / Nitrite: Positive   Leuk Esterase: Moderate / RBC: 11-25 /HPF / WBC TNTC /HPF   Sq Epi: x / Non Sq Epi: Few / Bacteria: Moderate      CAPILLARY BLOOD GLUCOSE      POCT Blood Glucose.: 130 mg/dL (22 Mar 2018 08:15)  POCT Blood Glucose.: 147 mg/dL (21 Mar 2018 21:49)  POCT Blood Glucose.: 253 mg/dL (21 Mar 2018 17:29)  POCT Blood Glucose.: 165 mg/dL (21 Mar 2018 11:59)      CARDIAC MARKERS ( 21 Mar 2018 03:56 )  3.340 ng/mL / x     / x     / x     / x      CARDIAC MARKERS ( 20 Mar 2018 18:25 )  2.690 ng/mL / x     / x     / x     / x      CARDIAC MARKERS ( 20 Mar 2018 10:28 )  2.890 ng/mL / x     / x     / x     / x          Hemoglobin A1C, Whole Blood: 8.1 % ( @ 07:59)    Cholesterol, Serum: 163 mg/dL ( @ 12:36)  HDL Cholesterol, Serum: 42 mg/dL ( @ 12:36)  Triglycerides, Serum: 69 mg/dL ( @ 12:36)      RADIOLOGY & ADDITIONAL TESTS:    Imaging Personally Reviewed:  [y ] YES  [ ] NO    Consultant(s) Notes Reviewed:  [y ] YES  [ ] NO    Care Discussed with Consultants/Other Providers [ ] YES  [ ] NO    PROBLEMS:  HEART FAILURE, ELEVATED TROPONIN LEVEL  SHORTNESS OF BREATH; UNABLE TO AMBULATE  Heart failure  3j fascicular HB started on mild B-blocker        Care discussed with family,         [  ]   yes  [  ]  No    imp:    stable[v ]    unstable[  ]     improving [   ]       unchanged  [  ]                Plans:  < diuretic, OOB to chait PT eval, BMP

## 2018-03-23 LAB
ANION GAP SERPL CALC-SCNC: 11 MMOL/L — SIGNIFICANT CHANGE UP (ref 5–17)
BUN SERPL-MCNC: 73 MG/DL — HIGH (ref 7–23)
CALCIUM SERPL-MCNC: 9.4 MG/DL — SIGNIFICANT CHANGE UP (ref 8.5–10.1)
CHLORIDE SERPL-SCNC: 94 MMOL/L — LOW (ref 96–108)
CO2 SERPL-SCNC: 27 MMOL/L — SIGNIFICANT CHANGE UP (ref 22–31)
CREAT SERPL-MCNC: 3.4 MG/DL — HIGH (ref 0.5–1.3)
GLUCOSE BLDC GLUCOMTR-MCNC: 121 MG/DL — HIGH (ref 70–99)
GLUCOSE BLDC GLUCOMTR-MCNC: 142 MG/DL — HIGH (ref 70–99)
GLUCOSE BLDC GLUCOMTR-MCNC: 181 MG/DL — HIGH (ref 70–99)
GLUCOSE SERPL-MCNC: 81 MG/DL — SIGNIFICANT CHANGE UP (ref 70–99)
M PROTEIN 24H UR ELPH-MRATE: PRESENT
MAGNESIUM SERPL-MCNC: 2.6 MG/DL — SIGNIFICANT CHANGE UP (ref 1.6–2.6)
POTASSIUM SERPL-MCNC: 4.4 MMOL/L — SIGNIFICANT CHANGE UP (ref 3.5–5.3)
POTASSIUM SERPL-SCNC: 4.4 MMOL/L — SIGNIFICANT CHANGE UP (ref 3.5–5.3)
PROT SERPL-MCNC: 8.2 G/DL — SIGNIFICANT CHANGE UP (ref 6–8.3)
PROT SERPL-MCNC: 8.2 G/DL — SIGNIFICANT CHANGE UP (ref 6–8.3)
SODIUM SERPL-SCNC: 132 MMOL/L — LOW (ref 135–145)

## 2018-03-23 RX ORDER — FUROSEMIDE 40 MG
40 TABLET ORAL
Qty: 0 | Refills: 0 | Status: DISCONTINUED | OUTPATIENT
Start: 2018-03-23 | End: 2018-03-24

## 2018-03-23 RX ADMIN — TICAGRELOR 90 MILLIGRAM(S): 90 TABLET ORAL at 06:37

## 2018-03-23 RX ADMIN — TICAGRELOR 90 MILLIGRAM(S): 90 TABLET ORAL at 17:35

## 2018-03-23 RX ADMIN — Medication 40 MILLIGRAM(S): at 17:35

## 2018-03-23 RX ADMIN — ENOXAPARIN SODIUM 60 MILLIGRAM(S): 100 INJECTION SUBCUTANEOUS at 12:05

## 2018-03-23 RX ADMIN — Medication 60 MILLIGRAM(S): at 06:37

## 2018-03-23 RX ADMIN — Medication 2: at 12:03

## 2018-03-23 RX ADMIN — Medication 325 MILLIGRAM(S): at 06:37

## 2018-03-23 RX ADMIN — Medication 50 MILLIGRAM(S): at 21:36

## 2018-03-23 RX ADMIN — Medication 50 MILLIGRAM(S): at 14:42

## 2018-03-23 RX ADMIN — INSULIN GLARGINE 14 UNIT(S): 100 INJECTION, SOLUTION SUBCUTANEOUS at 12:04

## 2018-03-23 RX ADMIN — Medication 50 MILLIGRAM(S): at 06:37

## 2018-03-23 RX ADMIN — Medication 81 MILLIGRAM(S): at 12:08

## 2018-03-23 RX ADMIN — Medication 325 MILLIGRAM(S): at 18:48

## 2018-03-23 NOTE — DIETITIAN INITIAL EVALUATION ADULT. - PERTINENT MEDS FT
Lovenox, Lasix, Toprol XL, Protonix, Hydralazine, Lantus, Humalog sliding scale, Ranexa, Senna, Sodium bi-carb

## 2018-03-23 NOTE — PROGRESS NOTE ADULT - SUBJECTIVE AND OBJECTIVE BOX
NO COMPLAINTS  RENAL FUNCTION SLIGHTLY WORSE  NO JVD  CLEAR LUNGS  SOFT S1  ABDOMEN SOFT, NONTENDER, NO DISTENSION   MINIMAL EDEMA    IMPRESSION:    CONTINUING MEDICAL MANAGEMENT OF ASHD  AS PER NEPHROLOGY

## 2018-03-23 NOTE — DIETITIAN INITIAL EVALUATION ADULT. - NS AS NUTRI INTERV ED CONTENT
Nutrition relationship to health/disease/Recommended modifications/Provided nutritional counseling/educational material

## 2018-03-23 NOTE — PROGRESS NOTE ADULT - SUBJECTIVE AND OBJECTIVE BOX
Patient feels well no new complaints today.    MEDICATIONS  (STANDING):  aspirin enteric coated 81 milliGRAM(s) Oral daily  buDESOnide 160 MICROgram(s)/formoterol 4.5 MICROgram(s) Inhaler 2 Puff(s) Inhalation two times a day  dextrose 5%. 1000 milliLiter(s) (50 mL/Hr) IV Continuous <Continuous>  dextrose 50% Injectable 12.5 Gram(s) IV Push once  dextrose 50% Injectable 25 Gram(s) IV Push once  dextrose 50% Injectable 25 Gram(s) IV Push once  dorzolamide 2% Ophthalmic Solution 1 Drop(s) Both EYES three times a day  enoxaparin Injectable 60 milliGRAM(s) SubCutaneous daily  furosemide    Tablet 40 milliGRAM(s) Oral two times a day  hydrALAZINE 50 milliGRAM(s) Oral every 8 hours  insulin glargine Injectable (LANTUS) 14 Unit(s) SubCutaneous every morning  insulin lispro (HumaLOG) corrective regimen sliding scale   SubCutaneous three times a day before meals  metoprolol succinate ER 25 milliGRAM(s) Oral daily  pantoprazole    Tablet 40 milliGRAM(s) Oral before breakfast  ranolazine 500 milliGRAM(s) Oral two times a day  senna 2 Tablet(s) Oral at bedtime  sodium bicarbonate 325 milliGRAM(s) Oral two times a day  ticagrelor 90 milliGRAM(s) Oral two times a day    MEDICATIONS  (PRN):  dextrose Gel 1 Dose(s) Oral once PRN Blood Glucose LESS THAN 70 milliGRAM(s)/deciliter  glucagon  Injectable 1 milliGRAM(s) IntraMuscular once PRN Glucose LESS THAN 70 milligrams/deciliter      03-22-18 @ 07:01  -  03-23-18 @ 07:00  --------------------------------------------------------  IN: 1020 mL / OUT: 1 mL / NET: 1019 mL      PHYSICAL EXAM:      T(C): 36.7 (03-23-18 @ 11:21), Max: 37.5 (03-23-18 @ 00:07)  HR: 75 (03-23-18 @ 11:21) (75 - 82)  BP: 128/56 (03-23-18 @ 11:21) (118/51 - 132/72)  RR: 17 (03-23-18 @ 11:21) (17 - 20)  SpO2: 98% (03-23-18 @ 11:21) (96% - 100%)  Wt(kg): --  Respiratory: clear anteriorly, decreased BS at bases no rales  Cardiovascular: S1 S2  Gastrointestinal: soft NT ND +BS  Extremities:  1+                                10.1   2.98  )-----------( 220      ( 22 Mar 2018 06:18 )             31.6     03-23    132<L>  |  94<L>  |  73<H>  ----------------------------<  81  4.4   |  27  |  3.40<H>    Ca    9.4      23 Mar 2018 07:28  Mg     2.6     03-23        Assessment and Plan:  MAXIMINO CKD3-4 CRS; stable;  On PO diuretics will require close outpatient follow up.

## 2018-03-23 NOTE — DIETITIAN INITIAL EVALUATION ADULT. - OTHER INFO
Pt seen for CHF. Pt lives alone; has supportive son & daughter nearby; receives 1 meal/day from [email or web address] program; has [employer] assistance 4 hrs/day x 6 days/wk; requires assistance c some ADL. Pt c DM; takes Levemir (16 units in am) + Novolog sliding scale as needed; SMBG x several times/day.  Denies any N/V/C/D or chew/swallowing difficulty; BM regular; last x 1 (3/22). Wt loss as needed based on nutrition assessment in Oct 2017 & pt report of decreased appetite & fluid fluctuation. October wt of 64.8 kg c 2+ edema noted b/l legs vs. presently no edema present.

## 2018-03-23 NOTE — DIETITIAN INITIAL EVALUATION ADULT. - PERTINENT LABORATORY DATA
03-23 Na132 mmol/L<L> Glu 81 mg/dL K+ 4.4 mmol/L Cr  3.40 mg/dL<H> BUN 73 mg/dL<H> 03-20 Alb 3.2 g/dL<L> 03-21 [id number] 8.1 %<H> 03-20 Chol 163 mg/dL  mg/dL HDL 42 mg/dL Trig 69 mg/dL; (3/22) POCT: 130, 217, 122, 130

## 2018-03-23 NOTE — DIETITIAN INITIAL EVALUATION ADULT. - ENERGY NEEDS
Height (cm): 149.86 (03-20)  Weight (kg): 60.6 (03-20)  BMI (kg/m2): 27 (03-20)  IBW:  44.5 kg +/- 10%    % IBW:  130%      UBW:  64.8 kg (Oct. 2017)      %UBW: 94%

## 2018-03-24 LAB
% ALBUMIN: 43.4 % — SIGNIFICANT CHANGE UP
% ALPHA 1: 4.4 % — SIGNIFICANT CHANGE UP
% ALPHA 2: 11 % — SIGNIFICANT CHANGE UP
% BETA: 8.2 % — SIGNIFICANT CHANGE UP
% GAMMA: 33 % — SIGNIFICANT CHANGE UP
% M SPIKE: 27.3 % — SIGNIFICANT CHANGE UP
ALBUMIN SERPL ELPH-MCNC: 3.6 G/DL — SIGNIFICANT CHANGE UP (ref 3.6–5.5)
ALBUMIN/GLOB SERPL ELPH: 0.8 RATIO — SIGNIFICANT CHANGE UP
ALPHA1 GLOB SERPL ELPH-MCNC: 0.4 G/DL — SIGNIFICANT CHANGE UP (ref 0.1–0.4)
ALPHA2 GLOB SERPL ELPH-MCNC: 0.9 G/DL — SIGNIFICANT CHANGE UP (ref 0.5–1)
ANA PAT FLD IF-IMP: ABNORMAL
ANA TITR SER: ABNORMAL
ANION GAP SERPL CALC-SCNC: 12 MMOL/L — SIGNIFICANT CHANGE UP (ref 5–17)
B-GLOBULIN SERPL ELPH-MCNC: 0.7 G/DL — SIGNIFICANT CHANGE UP (ref 0.5–1)
BUN SERPL-MCNC: 70 MG/DL — HIGH (ref 7–23)
CALCIUM SERPL-MCNC: 9.5 MG/DL — SIGNIFICANT CHANGE UP (ref 8.5–10.1)
CHLORIDE SERPL-SCNC: 92 MMOL/L — LOW (ref 96–108)
CO2 SERPL-SCNC: 27 MMOL/L — SIGNIFICANT CHANGE UP (ref 22–31)
CREAT SERPL-MCNC: 3.22 MG/DL — HIGH (ref 0.5–1.3)
GAMMA GLOBULIN: 2.7 G/DL — HIGH (ref 0.6–1.6)
GLUCOSE BLDC GLUCOMTR-MCNC: 114 MG/DL — HIGH (ref 70–99)
GLUCOSE BLDC GLUCOMTR-MCNC: 139 MG/DL — HIGH (ref 70–99)
GLUCOSE BLDC GLUCOMTR-MCNC: 221 MG/DL — HIGH (ref 70–99)
GLUCOSE BLDC GLUCOMTR-MCNC: 268 MG/DL — HIGH (ref 70–99)
GLUCOSE SERPL-MCNC: 78 MG/DL — SIGNIFICANT CHANGE UP (ref 70–99)
INTERPRETATION SERPL IFE-IMP: SIGNIFICANT CHANGE UP
M-SPIKE: 2.2 G/DL — HIGH (ref 0–0)
POTASSIUM SERPL-MCNC: 4 MMOL/L — SIGNIFICANT CHANGE UP (ref 3.5–5.3)
POTASSIUM SERPL-SCNC: 4 MMOL/L — SIGNIFICANT CHANGE UP (ref 3.5–5.3)
PROT PATTERN SERPL ELPH-IMP: SIGNIFICANT CHANGE UP
SODIUM SERPL-SCNC: 131 MMOL/L — LOW (ref 135–145)

## 2018-03-24 RX ADMIN — Medication 4: at 11:36

## 2018-03-24 RX ADMIN — Medication 50 MILLIGRAM(S): at 23:06

## 2018-03-24 RX ADMIN — RANOLAZINE 500 MILLIGRAM(S): 500 TABLET, FILM COATED, EXTENDED RELEASE ORAL at 06:56

## 2018-03-24 RX ADMIN — Medication 50 MILLIGRAM(S): at 13:09

## 2018-03-24 RX ADMIN — TICAGRELOR 90 MILLIGRAM(S): 90 TABLET ORAL at 06:56

## 2018-03-24 RX ADMIN — Medication 325 MILLIGRAM(S): at 07:03

## 2018-03-24 RX ADMIN — Medication 6: at 17:37

## 2018-03-24 RX ADMIN — RANOLAZINE 500 MILLIGRAM(S): 500 TABLET, FILM COATED, EXTENDED RELEASE ORAL at 19:00

## 2018-03-24 RX ADMIN — Medication 25 MILLIGRAM(S): at 06:56

## 2018-03-24 RX ADMIN — INSULIN GLARGINE 14 UNIT(S): 100 INJECTION, SOLUTION SUBCUTANEOUS at 11:35

## 2018-03-24 RX ADMIN — TICAGRELOR 90 MILLIGRAM(S): 90 TABLET ORAL at 18:59

## 2018-03-24 RX ADMIN — DORZOLAMIDE HYDROCHLORIDE 1 DROP(S): 20 SOLUTION/ DROPS OPHTHALMIC at 13:09

## 2018-03-24 RX ADMIN — SENNA PLUS 2 TABLET(S): 8.6 TABLET ORAL at 23:03

## 2018-03-24 RX ADMIN — BUDESONIDE AND FORMOTEROL FUMARATE DIHYDRATE 2 PUFF(S): 160; 4.5 AEROSOL RESPIRATORY (INHALATION) at 18:58

## 2018-03-24 RX ADMIN — BUDESONIDE AND FORMOTEROL FUMARATE DIHYDRATE 2 PUFF(S): 160; 4.5 AEROSOL RESPIRATORY (INHALATION) at 06:57

## 2018-03-24 RX ADMIN — Medication 50 MILLIGRAM(S): at 06:57

## 2018-03-24 RX ADMIN — Medication 81 MILLIGRAM(S): at 11:36

## 2018-03-24 RX ADMIN — Medication 40 MILLIGRAM(S): at 06:57

## 2018-03-24 RX ADMIN — ENOXAPARIN SODIUM 60 MILLIGRAM(S): 100 INJECTION SUBCUTANEOUS at 11:36

## 2018-03-24 NOTE — PROGRESS NOTE ADULT - SUBJECTIVE AND OBJECTIVE BOX
Patient is a 85y old  Female who presents with a chief complaint of SOB (20 Mar 2018 15:43)      INTERVAL HPI/OVERNIGHT EVENTS: much improved, no SOB, tolerates PT    MEDICATIONS  (STANDING):  aspirin enteric coated 81 milliGRAM(s) Oral daily  buDESOnide 160 MICROgram(s)/formoterol 4.5 MICROgram(s) Inhaler 2 Puff(s) Inhalation two times a day  dextrose 5%. 1000 milliLiter(s) (50 mL/Hr) IV Continuous <Continuous>  dextrose 50% Injectable 12.5 Gram(s) IV Push once  dextrose 50% Injectable 25 Gram(s) IV Push once  dextrose 50% Injectable 25 Gram(s) IV Push once  dorzolamide 2% Ophthalmic Solution 1 Drop(s) Both EYES three times a day  enoxaparin Injectable 60 milliGRAM(s) SubCutaneous daily  hydrALAZINE 50 milliGRAM(s) Oral every 8 hours  insulin glargine Injectable (LANTUS) 14 Unit(s) SubCutaneous every morning  insulin lispro (HumaLOG) corrective regimen sliding scale   SubCutaneous three times a day before meals  metoprolol succinate ER 25 milliGRAM(s) Oral daily  pantoprazole    Tablet 40 milliGRAM(s) Oral before breakfast  ranolazine 500 milliGRAM(s) Oral two times a day  senna 2 Tablet(s) Oral at bedtime  sodium bicarbonate 325 milliGRAM(s) Oral two times a day  ticagrelor 90 milliGRAM(s) Oral two times a day    MEDICATIONS  (PRN):  dextrose Gel 1 Dose(s) Oral once PRN Blood Glucose LESS THAN 70 milliGRAM(s)/deciliter  glucagon  Injectable 1 milliGRAM(s) IntraMuscular once PRN Glucose LESS THAN 70 milligrams/deciliter      Allergies    codeine (Other)    Intolerances        REVIEW OF SYSTEMS:        HEENT - wnl  RESPIRATORY: No cough, wheezing, chills or hemoptysis; No shortness of breath  CARDIOVASCULAR: No chest pain, palpitations, dizziness, or leg swelling  GASTROINTESTINAL: No abdominal or epigastric pain. No nausea, vomiting, or hematemesis; No diarrhea or constipation. No melena or hematochezia.  GENITOURINARY: No dysuria, frequency, hematuria, or incontinence  SKIN: No itching, burning, rashes, or lesions   MUSCULOSKELETAL: No joint pain or swelling; No muscle, back, or extremity pain  PSYCHIATRIC: No depression, anxiety, mood swings, or difficulty sleeping        Vital Signs Last 24 Hrs  T(C): 36.6 (24 Mar 2018 08:22), Max: 37.1 (24 Mar 2018 00:32)  T(F): 97.9 (24 Mar 2018 08:22), Max: 98.8 (24 Mar 2018 04:51)  HR: 79 (24 Mar 2018 08:22) (75 - 88)  BP: 128/60 (24 Mar 2018 08:22) (122/56 - 130/57)  BP(mean): --  RR: 17 (24 Mar 2018 08:22) (17 - 18)  SpO2: 96% (24 Mar 2018 08:22) (96% - 98%)    PHYSICAL EXAM:  general       HEENT wnl  CHEST/LUNG: Clear to percussion bilaterally; No rales, rhonchi, wheezing, or rubs  HEART: Regular rate and rhythm; No murmurs, rubs, or gallops  ABDOMEN: Soft, Nontender, Nondistended; Bowel sounds present  EXTREMITIES:  2+ Peripheral Pulses, No clubbing, cyanosis, or edema  LYMPH: No lymphadenopathy noted  SKIN: No rashes or lesions    LABS:    03-24    131<L>  |  92<L>  |  70<H>  ----------------------------<  78  4.0   |  27  |  3.22<H>    Ca    9.5      24 Mar 2018 07:24  Mg     2.6     03-23          CAPILLARY BLOOD GLUCOSE      POCT Blood Glucose.: 114 mg/dL (24 Mar 2018 08:19)  POCT Blood Glucose.: 121 mg/dL (23 Mar 2018 22:34)  POCT Blood Glucose.: 142 mg/dL (23 Mar 2018 17:28)  POCT Blood Glucose.: 181 mg/dL (23 Mar 2018 11:49)          Hemoglobin A1C, Whole Blood: 8.1 % (03-21 @ 07:59)    Cholesterol, Serum: 163 mg/dL (03-20 @ 12:36)  HDL Cholesterol, Serum: 42 mg/dL (03-20 @ 12:36)  Triglycerides, Serum: 69 mg/dL (03-20 @ 12:36)      RADIOLOGY & ADDITIONAL TESTS:    Imaging Personally Reviewed:  [y ] YES  [ ] NO    Consultant(s) Notes Reviewed:  [y ] YES  [ ] NO    Care Discussed with Consultants/Other Providers [ ] YES  [ ] NO    PROBLEMS:  HEART FAILURE, ELEVATED TROPONIN LEVEL  SHORTNESS OF BREATH; UNABLE TO AMBULATE  Heart failure  ARF/CKD      Care discussed with family,         [  ]   yes  [  ]  No    imp:    stable[ ]    unstable[  ]     improving [ v  ]       unchanged  [  ]                Plans:  Continue present plans  [v] f/u BMP and Wt

## 2018-03-24 NOTE — PHYSICAL THERAPY INITIAL EVALUATION ADULT - MODIFIED CLINICAL TEST OF SENSORY INTEGRATION IN BALANCE TEST
Barthel Index: Feeding Score  10__, Bathing Score _0 __, Grooming Score _  5__, Dressing Score _  10__, Bowels Score _ 10__, Bladder Score _ 10__, Toilet Score _ 10__, Transfers Score _  5  _, Mobility Score _0  __, Stairs Score _0 __,     Total Score ___

## 2018-03-24 NOTE — PHYSICAL THERAPY INITIAL EVALUATION ADULT - ADDITIONAL COMMENTS
Pt states she lives alone in a house with stairs to her bedroom. Pt indep with ADLS/ambulation with cane. Aide  2x/wk 4hrs to assist with cooking and cleaning.

## 2018-03-24 NOTE — PROGRESS NOTE ADULT - SUBJECTIVE AND OBJECTIVE BOX
NO COMPLAINTS  EUVOLEMIC  STOP LASIX AND OBSERVE  MEDICAL MANAGEMENT OF ASHD/NSTEMI; chronic renal insufficiency

## 2018-03-25 LAB
ANION GAP SERPL CALC-SCNC: 11 MMOL/L — SIGNIFICANT CHANGE UP (ref 5–17)
BUN SERPL-MCNC: 81 MG/DL — HIGH (ref 7–23)
CALCIUM SERPL-MCNC: 9.3 MG/DL — SIGNIFICANT CHANGE UP (ref 8.5–10.1)
CHLORIDE SERPL-SCNC: 91 MMOL/L — LOW (ref 96–108)
CO2 SERPL-SCNC: 27 MMOL/L — SIGNIFICANT CHANGE UP (ref 22–31)
CREAT SERPL-MCNC: 3.54 MG/DL — HIGH (ref 0.5–1.3)
GLUCOSE BLDC GLUCOMTR-MCNC: 145 MG/DL — HIGH (ref 70–99)
GLUCOSE BLDC GLUCOMTR-MCNC: 182 MG/DL — HIGH (ref 70–99)
GLUCOSE BLDC GLUCOMTR-MCNC: 193 MG/DL — HIGH (ref 70–99)
GLUCOSE BLDC GLUCOMTR-MCNC: 347 MG/DL — HIGH (ref 70–99)
GLUCOSE SERPL-MCNC: 120 MG/DL — HIGH (ref 70–99)
POTASSIUM SERPL-MCNC: 4.7 MMOL/L — SIGNIFICANT CHANGE UP (ref 3.5–5.3)
POTASSIUM SERPL-SCNC: 4.7 MMOL/L — SIGNIFICANT CHANGE UP (ref 3.5–5.3)
SODIUM SERPL-SCNC: 129 MMOL/L — LOW (ref 135–145)

## 2018-03-25 RX ORDER — INSULIN LISPRO 100/ML
2 VIAL (ML) SUBCUTANEOUS ONCE
Qty: 0 | Refills: 0 | Status: COMPLETED | OUTPATIENT
Start: 2018-03-25 | End: 2018-03-25

## 2018-03-25 RX ADMIN — ENOXAPARIN SODIUM 60 MILLIGRAM(S): 100 INJECTION SUBCUTANEOUS at 14:18

## 2018-03-25 RX ADMIN — Medication 30 MILLILITER(S): at 11:42

## 2018-03-25 RX ADMIN — Medication 25 MILLIGRAM(S): at 06:27

## 2018-03-25 RX ADMIN — Medication 2: at 17:18

## 2018-03-25 RX ADMIN — Medication 50 MILLIGRAM(S): at 06:27

## 2018-03-25 RX ADMIN — Medication 81 MILLIGRAM(S): at 14:18

## 2018-03-25 RX ADMIN — Medication 2: at 11:42

## 2018-03-25 RX ADMIN — RANOLAZINE 500 MILLIGRAM(S): 500 TABLET, FILM COATED, EXTENDED RELEASE ORAL at 06:27

## 2018-03-25 RX ADMIN — INSULIN GLARGINE 14 UNIT(S): 100 INJECTION, SOLUTION SUBCUTANEOUS at 08:35

## 2018-03-25 RX ADMIN — TICAGRELOR 90 MILLIGRAM(S): 90 TABLET ORAL at 19:25

## 2018-03-25 RX ADMIN — TICAGRELOR 90 MILLIGRAM(S): 90 TABLET ORAL at 06:28

## 2018-03-25 RX ADMIN — RANOLAZINE 500 MILLIGRAM(S): 500 TABLET, FILM COATED, EXTENDED RELEASE ORAL at 19:25

## 2018-03-25 RX ADMIN — Medication 325 MILLIGRAM(S): at 06:28

## 2018-03-25 NOTE — PROGRESS NOTE ADULT - SUBJECTIVE AND OBJECTIVE BOX
c/o nausea "Protonix caused"?  Cr>3.5  Agree to d/c diuretic  no SOB, no CP  PT eval noted  Lungs clear  S1S2 rrr  Abd soft  Extr no c/e  - 125  Maalox 30ml prn  d/c Protonix  BMP am

## 2018-03-26 LAB
ANION GAP SERPL CALC-SCNC: 9 MMOL/L — SIGNIFICANT CHANGE UP (ref 5–17)
BUN SERPL-MCNC: 96 MG/DL — HIGH (ref 7–23)
CALCIUM SERPL-MCNC: 9.5 MG/DL — SIGNIFICANT CHANGE UP (ref 8.5–10.1)
CHLORIDE SERPL-SCNC: 92 MMOL/L — LOW (ref 96–108)
CO2 SERPL-SCNC: 27 MMOL/L — SIGNIFICANT CHANGE UP (ref 22–31)
CREAT SERPL-MCNC: 4.22 MG/DL — HIGH (ref 0.5–1.3)
GLUCOSE BLDC GLUCOMTR-MCNC: 213 MG/DL — HIGH (ref 70–99)
GLUCOSE BLDC GLUCOMTR-MCNC: 231 MG/DL — HIGH (ref 70–99)
GLUCOSE BLDC GLUCOMTR-MCNC: 241 MG/DL — HIGH (ref 70–99)
GLUCOSE BLDC GLUCOMTR-MCNC: 283 MG/DL — HIGH (ref 70–99)
GLUCOSE SERPL-MCNC: 229 MG/DL — HIGH (ref 70–99)
POTASSIUM SERPL-MCNC: 5.2 MMOL/L — SIGNIFICANT CHANGE UP (ref 3.5–5.3)
POTASSIUM SERPL-SCNC: 5.2 MMOL/L — SIGNIFICANT CHANGE UP (ref 3.5–5.3)
SODIUM SERPL-SCNC: 128 MMOL/L — LOW (ref 135–145)

## 2018-03-26 RX ORDER — SODIUM CHLORIDE 9 MG/ML
1000 INJECTION INTRAMUSCULAR; INTRAVENOUS; SUBCUTANEOUS
Qty: 0 | Refills: 0 | Status: COMPLETED | OUTPATIENT
Start: 2018-03-26 | End: 2018-03-26

## 2018-03-26 RX ORDER — HYDRALAZINE HCL 50 MG
25 TABLET ORAL EVERY 8 HOURS
Qty: 0 | Refills: 0 | Status: DISCONTINUED | OUTPATIENT
Start: 2018-03-26 | End: 2018-04-01

## 2018-03-26 RX ORDER — ONDANSETRON 8 MG/1
4 TABLET, FILM COATED ORAL ONCE
Qty: 0 | Refills: 0 | Status: COMPLETED | OUTPATIENT
Start: 2018-03-26 | End: 2018-03-26

## 2018-03-26 RX ORDER — ALLOPURINOL 300 MG
100 TABLET ORAL DAILY
Qty: 0 | Refills: 0 | Status: DISCONTINUED | OUTPATIENT
Start: 2018-03-26 | End: 2018-04-01

## 2018-03-26 RX ADMIN — BUDESONIDE AND FORMOTEROL FUMARATE DIHYDRATE 2 PUFF(S): 160; 4.5 AEROSOL RESPIRATORY (INHALATION) at 17:24

## 2018-03-26 RX ADMIN — SODIUM CHLORIDE 70 MILLILITER(S): 9 INJECTION INTRAMUSCULAR; INTRAVENOUS; SUBCUTANEOUS at 17:21

## 2018-03-26 RX ADMIN — Medication 10 MILLIGRAM(S): at 12:54

## 2018-03-26 RX ADMIN — Medication 2 UNIT(S): at 00:01

## 2018-03-26 RX ADMIN — ONDANSETRON 4 MILLIGRAM(S): 8 TABLET, FILM COATED ORAL at 21:29

## 2018-03-26 RX ADMIN — Medication 6: at 17:22

## 2018-03-26 RX ADMIN — RANOLAZINE 500 MILLIGRAM(S): 500 TABLET, FILM COATED, EXTENDED RELEASE ORAL at 12:55

## 2018-03-26 RX ADMIN — DORZOLAMIDE HYDROCHLORIDE 1 DROP(S): 20 SOLUTION/ DROPS OPHTHALMIC at 12:59

## 2018-03-26 RX ADMIN — Medication 4: at 12:57

## 2018-03-26 RX ADMIN — TICAGRELOR 90 MILLIGRAM(S): 90 TABLET ORAL at 21:28

## 2018-03-26 RX ADMIN — RANOLAZINE 500 MILLIGRAM(S): 500 TABLET, FILM COATED, EXTENDED RELEASE ORAL at 21:30

## 2018-03-26 RX ADMIN — Medication 100 MILLIGRAM(S): at 12:58

## 2018-03-26 RX ADMIN — Medication 30 MILLILITER(S): at 02:15

## 2018-03-26 RX ADMIN — Medication 325 MILLIGRAM(S): at 21:29

## 2018-03-26 RX ADMIN — INSULIN GLARGINE 14 UNIT(S): 100 INJECTION, SOLUTION SUBCUTANEOUS at 12:52

## 2018-03-26 RX ADMIN — Medication 25 MILLIGRAM(S): at 13:00

## 2018-03-26 RX ADMIN — Medication 25 MILLIGRAM(S): at 12:51

## 2018-03-26 RX ADMIN — Medication 81 MILLIGRAM(S): at 12:58

## 2018-03-26 RX ADMIN — SENNA PLUS 2 TABLET(S): 8.6 TABLET ORAL at 21:28

## 2018-03-26 RX ADMIN — Medication 325 MILLIGRAM(S): at 12:56

## 2018-03-26 RX ADMIN — Medication 4: at 08:55

## 2018-03-26 RX ADMIN — TICAGRELOR 90 MILLIGRAM(S): 90 TABLET ORAL at 12:55

## 2018-03-26 RX ADMIN — ENOXAPARIN SODIUM 60 MILLIGRAM(S): 100 INJECTION SUBCUTANEOUS at 13:00

## 2018-03-26 NOTE — PROGRESS NOTE ADULT - SUBJECTIVE AND OBJECTIVE BOX
NAUSEOUS  CONSTIPATED  NO JVD  NO APPRECIABLE RUB  NO ASTERIXIS  COARSE BREATH SOUNDS  ABDOMEN SOFT, NONTENDER, NO DISTENSION  NO CLUBBING CYANOSIS OR EDEMA    WORSENING RENAL FUNCTION  NEPHROLOGY FOLLOW UP  CONTINUING PRESENT CV CARE

## 2018-03-26 NOTE — PROGRESS NOTE ADULT - SUBJECTIVE AND OBJECTIVE BOX
Feeling weak, intermittent nausea;     MEDICATIONS  (STANDING):  allopurinol 100 milliGRAM(s) Oral daily  aspirin enteric coated 81 milliGRAM(s) Oral daily  buDESOnide 160 MICROgram(s)/formoterol 4.5 MICROgram(s) Inhaler 2 Puff(s) Inhalation two times a day  dextrose 5%. 1000 milliLiter(s) (50 mL/Hr) IV Continuous <Continuous>  dextrose 50% Injectable 12.5 Gram(s) IV Push once  dextrose 50% Injectable 25 Gram(s) IV Push once  dextrose 50% Injectable 25 Gram(s) IV Push once  dorzolamide 2% Ophthalmic Solution 1 Drop(s) Both EYES three times a day  enoxaparin Injectable 60 milliGRAM(s) SubCutaneous daily  hydrALAZINE 25 milliGRAM(s) Oral every 8 hours  insulin glargine Injectable (LANTUS) 14 Unit(s) SubCutaneous every morning  insulin lispro (HumaLOG) corrective regimen sliding scale   SubCutaneous three times a day before meals  metoprolol succinate ER 25 milliGRAM(s) Oral daily  ranolazine 500 milliGRAM(s) Oral two times a day  senna 2 Tablet(s) Oral at bedtime  sodium bicarbonate 325 milliGRAM(s) Oral two times a day  sodium chloride 0.9%. 1000 milliLiter(s) (70 mL/Hr) IV Continuous <Continuous>  ticagrelor 90 milliGRAM(s) Oral two times a day    MEDICATIONS  (PRN):  aluminum hydroxide/magnesium hydroxide/simethicone Suspension 30 milliLiter(s) Oral every 4 hours PRN Dyspepsia  dextrose Gel 1 Dose(s) Oral once PRN Blood Glucose LESS THAN 70 milliGRAM(s)/deciliter  glucagon  Injectable 1 milliGRAM(s) IntraMuscular once PRN Glucose LESS THAN 70 milligrams/deciliter      PHYSICAL EXAM:      T(C): 36.6 (03-26-18 @ 11:00), Max: 36.9 (03-25-18 @ 23:53)  HR: 78 (03-26-18 @ 11:00) (71 - 80)  BP: 124/65 (03-26-18 @ 11:00) (106/61 - 124/65)  RR: 19 (03-26-18 @ 11:00) (17 - 19)  SpO2: 98% (03-26-18 @ 11:00) (96% - 99%)  Wt(kg): --  Respiratory: clear anteriorly, decreased BS at bases with crackles as bases  Cardiovascular: S1 S2  Gastrointestinal: soft NT ND +BS  Extremities:   1 edema                03-26    128<L>  |  92<L>  |  96<H>  ----------------------------<  229<H>  5.2   |  27  |  4.22<H>    Ca    9.5      26 Mar 2018 07:20            Assessment and Plan:    MAXIMINO; CKD 3; initially suspected CHF but EF nl mild DD; renal artery doppler without NAIF; paraprotein;  Noted CT chest 10 2017 c/w interstitial lung disease; serologic tests at the time negative; ACE level high normal.  Heme and pulm eval;   Judicious IVF for now;   Repeat ACE level; will follow closely. Feeling weak, intermittent nausea;     MEDICATIONS  (STANDING):  allopurinol 100 milliGRAM(s) Oral daily  aspirin enteric coated 81 milliGRAM(s) Oral daily  buDESOnide 160 MICROgram(s)/formoterol 4.5 MICROgram(s) Inhaler 2 Puff(s) Inhalation two times a day  dextrose 5%. 1000 milliLiter(s) (50 mL/Hr) IV Continuous <Continuous>  dextrose 50% Injectable 12.5 Gram(s) IV Push once  dextrose 50% Injectable 25 Gram(s) IV Push once  dextrose 50% Injectable 25 Gram(s) IV Push once  dorzolamide 2% Ophthalmic Solution 1 Drop(s) Both EYES three times a day  enoxaparin Injectable 60 milliGRAM(s) SubCutaneous daily  hydrALAZINE 25 milliGRAM(s) Oral every 8 hours  insulin glargine Injectable (LANTUS) 14 Unit(s) SubCutaneous every morning  insulin lispro (HumaLOG) corrective regimen sliding scale   SubCutaneous three times a day before meals  metoprolol succinate ER 25 milliGRAM(s) Oral daily  ranolazine 500 milliGRAM(s) Oral two times a day  senna 2 Tablet(s) Oral at bedtime  sodium bicarbonate 325 milliGRAM(s) Oral two times a day  sodium chloride 0.9%. 1000 milliLiter(s) (70 mL/Hr) IV Continuous <Continuous>  ticagrelor 90 milliGRAM(s) Oral two times a day    MEDICATIONS  (PRN):  aluminum hydroxide/magnesium hydroxide/simethicone Suspension 30 milliLiter(s) Oral every 4 hours PRN Dyspepsia  dextrose Gel 1 Dose(s) Oral once PRN Blood Glucose LESS THAN 70 milliGRAM(s)/deciliter  glucagon  Injectable 1 milliGRAM(s) IntraMuscular once PRN Glucose LESS THAN 70 milligrams/deciliter      PHYSICAL EXAM:      T(C): 36.6 (03-26-18 @ 11:00), Max: 36.9 (03-25-18 @ 23:53)  HR: 78 (03-26-18 @ 11:00) (71 - 80)  BP: 124/65 (03-26-18 @ 11:00) (106/61 - 124/65)  RR: 19 (03-26-18 @ 11:00) (17 - 19)  SpO2: 98% (03-26-18 @ 11:00) (96% - 99%)  Wt(kg): --  Respiratory: clear anteriorly, decreased BS at bases with crackles as bases  Cardiovascular: S1 S2  Gastrointestinal: soft NT ND +BS  Extremities:   1 edema                03-26    128<L>  |  92<L>  |  96<H>  ----------------------------<  229<H>  5.2   |  27  |  4.22<H>    Ca    9.5      26 Mar 2018 07:20            Assessment and Plan:    MAXIMINO; CKD 3; initially suspected CHF but EF nl mild DD; renal artery doppler without NAIF; paraprotein;  Noted CT chest 10 2017 c/w interstitial lung disease; serologic tests at the time negative; ACE level high normal.  Heme and pulm eval;   IgG subsets;   Add Allopurinol; follow Uric acid trend;   Judicious IVF for now;   Repeat ACE level; will follow closely.

## 2018-03-26 NOTE — PROGRESS NOTE ADULT - SUBJECTIVE AND OBJECTIVE BOX
Patient is a 85y old  Female who presents with a chief complaint of SOB (20 Mar 2018 15:43)      INTERVAL HPI/OVERNIGHT EVENTS: feeling nauseous, weakness. No BM x 4 days    MEDICATIONS  (STANDING):  allopurinol 100 milliGRAM(s) Oral daily  aspirin enteric coated 81 milliGRAM(s) Oral daily  bisacodyl 10 milliGRAM(s) Oral once  buDESOnide 160 MICROgram(s)/formoterol 4.5 MICROgram(s) Inhaler 2 Puff(s) Inhalation two times a day  dextrose 5%. 1000 milliLiter(s) (50 mL/Hr) IV Continuous <Continuous>  dextrose 50% Injectable 12.5 Gram(s) IV Push once  dextrose 50% Injectable 25 Gram(s) IV Push once  dextrose 50% Injectable 25 Gram(s) IV Push once  dorzolamide 2% Ophthalmic Solution 1 Drop(s) Both EYES three times a day  enoxaparin Injectable 60 milliGRAM(s) SubCutaneous daily  hydrALAZINE 25 milliGRAM(s) Oral every 8 hours  insulin glargine Injectable (LANTUS) 14 Unit(s) SubCutaneous every morning  insulin lispro (HumaLOG) corrective regimen sliding scale   SubCutaneous three times a day before meals  metoprolol succinate ER 25 milliGRAM(s) Oral daily  ranolazine 500 milliGRAM(s) Oral two times a day  senna 2 Tablet(s) Oral at bedtime  sodium bicarbonate 325 milliGRAM(s) Oral two times a day  sodium chloride 0.9%. 1000 milliLiter(s) (70 mL/Hr) IV Continuous <Continuous>  ticagrelor 90 milliGRAM(s) Oral two times a day    MEDICATIONS  (PRN):  aluminum hydroxide/magnesium hydroxide/simethicone Suspension 30 milliLiter(s) Oral every 4 hours PRN Dyspepsia  dextrose Gel 1 Dose(s) Oral once PRN Blood Glucose LESS THAN 70 milliGRAM(s)/deciliter  glucagon  Injectable 1 milliGRAM(s) IntraMuscular once PRN Glucose LESS THAN 70 milligrams/deciliter      Allergies    codeine (Other)    Intolerances        REVIEW OF SYSTEMS:  progressive weakness      Vital Signs Last 24 Hrs  T(C): 36.6 (26 Mar 2018 05:33), Max: 36.9 (25 Mar 2018 23:53)  T(F): 97.8 (26 Mar 2018 05:33), Max: 98.5 (25 Mar 2018 23:53)  HR: 80 (26 Mar 2018 05:33) (71 - 80)  BP: 112/62 (26 Mar 2018 05:33) (106/61 - 119/56)  BP(mean): --  RR: 18 (26 Mar 2018 05:33) (16 - 18)  SpO2: 96% (26 Mar 2018 05:33) (96% - 100%)    PHYSICAL EXAM:  general       HEENT wnl  CHEST/LUNG: Clear to percussion bilaterally; No rales, rhonchi, wheezing, or rubs  HEART: Regular rate and rhythm; No murmurs, rubs, or gallops  ABDOMEN: Soft, Nontender, Nondistended; Bowel sounds present  EXTREMITIES:  2+ Peripheral Pulses, No clubbing, cyanosis, or edema  LYMPH: No lymphadenopathy noted  SKIN: No rashes or lesions    LABS:    03-26    128<L>  |  92<L>  |  96<H>  ----------------------------<  229<H>  5.2   |  27  |  4.22<H>    Ca    9.5      26 Mar 2018 07:20          CAPILLARY BLOOD GLUCOSE      POCT Blood Glucose.: 241 mg/dL (26 Mar 2018 08:54)  POCT Blood Glucose.: 347 mg/dL (25 Mar 2018 23:09)  POCT Blood Glucose.: 182 mg/dL (25 Mar 2018 17:12)  POCT Blood Glucose.: 193 mg/dL (25 Mar 2018 11:30)          Hemoglobin A1C, Whole Blood: 8.1 % (03-21 @ 07:59)    Cholesterol, Serum: 163 mg/dL (03-20 @ 12:36)  HDL Cholesterol, Serum: 42 mg/dL (03-20 @ 12:36)  Triglycerides, Serum: 69 mg/dL (03-20 @ 12:36)      RADIOLOGY & ADDITIONAL TESTS:    Imaging Personally Reviewed:  [y ] YES  [ ] NO    Consultant(s) Notes Reviewed:  [y ] YES  [ ] NO discussed case at length c Dr Chaudhry    Care Discussed with Consultants/Other Providers [ ] YES  [ ] NO    PROBLEMS:  HEART FAILURE, ELEVATED TROPONIN LEVEL  SHORTNESS OF BREATH; UNABLE TO AMBULATE  Heart failure  Acute MI  ARF/CKD  T2DM      Care discussed with family,         [  ]   yes  [  ]  No    imp:    stable[ ]    unstable[ v ]     improving [   ]       unchanged  [  ]                Plans:  hold diuretic. Hematology consult  Pulmonary consult ? interstitia lung disease  ? myeloma

## 2018-03-27 LAB
ALBUMIN SERPL ELPH-MCNC: 3.2 G/DL — LOW (ref 3.3–5)
ALP SERPL-CCNC: 79 U/L — SIGNIFICANT CHANGE UP (ref 40–120)
ALT FLD-CCNC: 48 U/L — SIGNIFICANT CHANGE UP (ref 12–78)
ANION GAP SERPL CALC-SCNC: 11 MMOL/L — SIGNIFICANT CHANGE UP (ref 5–17)
ANION GAP SERPL CALC-SCNC: 13 MMOL/L — SIGNIFICANT CHANGE UP (ref 5–17)
ANISOCYTOSIS BLD QL: SLIGHT — SIGNIFICANT CHANGE UP
APPEARANCE UR: ABNORMAL
AST SERPL-CCNC: 74 U/L — HIGH (ref 15–37)
BACTERIA # UR AUTO: ABNORMAL
BASOPHILS # BLD AUTO: 0.04 K/UL — SIGNIFICANT CHANGE UP (ref 0–0.2)
BASOPHILS NFR BLD AUTO: 1 % — SIGNIFICANT CHANGE UP (ref 0–2)
BILIRUB SERPL-MCNC: 0.4 MG/DL — SIGNIFICANT CHANGE UP (ref 0.2–1.2)
BILIRUB UR-MCNC: NEGATIVE — SIGNIFICANT CHANGE UP
BUN SERPL-MCNC: 102 MG/DL — HIGH (ref 7–23)
BUN SERPL-MCNC: 106 MG/DL — HIGH (ref 7–23)
CALCIUM SERPL-MCNC: 9.2 MG/DL — SIGNIFICANT CHANGE UP (ref 8.5–10.1)
CALCIUM SERPL-MCNC: 9.6 MG/DL — SIGNIFICANT CHANGE UP (ref 8.5–10.1)
CHLORIDE SERPL-SCNC: 93 MMOL/L — LOW (ref 96–108)
CHLORIDE SERPL-SCNC: 93 MMOL/L — LOW (ref 96–108)
CO2 SERPL-SCNC: 25 MMOL/L — SIGNIFICANT CHANGE UP (ref 22–31)
CO2 SERPL-SCNC: 28 MMOL/L — SIGNIFICANT CHANGE UP (ref 22–31)
COLOR SPEC: YELLOW — SIGNIFICANT CHANGE UP
CREAT SERPL-MCNC: 4.54 MG/DL — HIGH (ref 0.5–1.3)
CREAT SERPL-MCNC: 4.9 MG/DL — HIGH (ref 0.5–1.3)
DIFF PNL FLD: ABNORMAL
EOSINOPHIL # BLD AUTO: 0 K/UL — SIGNIFICANT CHANGE UP (ref 0–0.5)
EOSINOPHIL NFR BLD AUTO: 0 % — SIGNIFICANT CHANGE UP (ref 0–6)
EOSINOPHIL NFR URNS MANUAL: NEGATIVE — SIGNIFICANT CHANGE UP
EPI CELLS # UR: ABNORMAL
GLUCOSE BLDC GLUCOMTR-MCNC: 129 MG/DL — HIGH (ref 70–99)
GLUCOSE BLDC GLUCOMTR-MCNC: 175 MG/DL — HIGH (ref 70–99)
GLUCOSE BLDC GLUCOMTR-MCNC: 237 MG/DL — HIGH (ref 70–99)
GLUCOSE BLDC GLUCOMTR-MCNC: 266 MG/DL — HIGH (ref 70–99)
GLUCOSE SERPL-MCNC: 142 MG/DL — HIGH (ref 70–99)
GLUCOSE SERPL-MCNC: 145 MG/DL — HIGH (ref 70–99)
GLUCOSE UR QL: NEGATIVE MG/DL — SIGNIFICANT CHANGE UP
HCT VFR BLD CALC: 30.7 % — LOW (ref 34.5–45)
HGB BLD-MCNC: 9.8 G/DL — LOW (ref 11.5–15.5)
HYPOCHROMIA BLD QL: SLIGHT — SIGNIFICANT CHANGE UP
KETONES UR-MCNC: NEGATIVE — SIGNIFICANT CHANGE UP
LEUKOCYTE ESTERASE UR-ACNC: ABNORMAL
LYMPHOCYTES # BLD AUTO: 0.74 K/UL — LOW (ref 1–3.3)
LYMPHOCYTES # BLD AUTO: 18 % — SIGNIFICANT CHANGE UP (ref 13–44)
MACROCYTES BLD QL: SLIGHT — SIGNIFICANT CHANGE UP
MANUAL SMEAR VERIFICATION: SIGNIFICANT CHANGE UP
MCHC RBC-ENTMCNC: 28.3 PG — SIGNIFICANT CHANGE UP (ref 27–34)
MCHC RBC-ENTMCNC: 31.9 GM/DL — LOW (ref 32–36)
MCV RBC AUTO: 88.7 FL — SIGNIFICANT CHANGE UP (ref 80–100)
MONOCYTES # BLD AUTO: 0.21 K/UL — SIGNIFICANT CHANGE UP (ref 0–0.9)
MONOCYTES NFR BLD AUTO: 5 % — SIGNIFICANT CHANGE UP (ref 2–14)
NEUTROPHILS # BLD AUTO: 3.12 K/UL — SIGNIFICANT CHANGE UP (ref 1.8–7.4)
NEUTROPHILS NFR BLD AUTO: 76 % — SIGNIFICANT CHANGE UP (ref 43–77)
NITRITE UR-MCNC: NEGATIVE — SIGNIFICANT CHANGE UP
NRBC # BLD: 1 /100 — HIGH (ref 0–0)
NRBC # BLD: SIGNIFICANT CHANGE UP /100 WBCS (ref 0–0)
NT-PROBNP SERPL-SCNC: HIGH PG/ML (ref 0–450)
PH UR: 7 — SIGNIFICANT CHANGE UP (ref 5–8)
PLAT MORPH BLD: NORMAL — SIGNIFICANT CHANGE UP
PLATELET # BLD AUTO: 254 K/UL — SIGNIFICANT CHANGE UP (ref 150–400)
POLYCHROMASIA BLD QL SMEAR: SLIGHT — SIGNIFICANT CHANGE UP
POTASSIUM SERPL-MCNC: 5.4 MMOL/L — HIGH (ref 3.5–5.3)
POTASSIUM SERPL-MCNC: 5.4 MMOL/L — HIGH (ref 3.5–5.3)
POTASSIUM SERPL-SCNC: 5.4 MMOL/L — HIGH (ref 3.5–5.3)
POTASSIUM SERPL-SCNC: 5.4 MMOL/L — HIGH (ref 3.5–5.3)
PROT SERPL-MCNC: 9.1 GM/DL — HIGH (ref 6–8.3)
PROT UR-MCNC: 100 MG/DL
RBC # BLD: 3.46 M/UL — LOW (ref 3.8–5.2)
RBC # FLD: 13.8 % — SIGNIFICANT CHANGE UP (ref 10.3–14.5)
RBC BLD AUTO: ABNORMAL
RBC CASTS # UR COMP ASSIST: >50 /HPF (ref 0–4)
SODIUM SERPL-SCNC: 131 MMOL/L — LOW (ref 135–145)
SODIUM SERPL-SCNC: 132 MMOL/L — LOW (ref 135–145)
SP GR SPEC: 1 — LOW (ref 1.01–1.02)
TROPONIN I SERPL-MCNC: 23.9 NG/ML — HIGH (ref 0.01–0.04)
URATE UR-MCNC: 5.7 MG/DL — SIGNIFICANT CHANGE UP
UROBILINOGEN FLD QL: NEGATIVE MG/DL — SIGNIFICANT CHANGE UP
WBC # BLD: 4.1 K/UL — SIGNIFICANT CHANGE UP (ref 3.8–10.5)
WBC # FLD AUTO: 4.1 K/UL — SIGNIFICANT CHANGE UP (ref 3.8–10.5)
WBC UR QL: >50

## 2018-03-27 PROCEDURE — 71045 X-RAY EXAM CHEST 1 VIEW: CPT | Mod: 26

## 2018-03-27 PROCEDURE — 99223 1ST HOSP IP/OBS HIGH 75: CPT

## 2018-03-27 RX ORDER — ONDANSETRON 8 MG/1
4 TABLET, FILM COATED ORAL ONCE
Qty: 0 | Refills: 0 | Status: COMPLETED | OUTPATIENT
Start: 2018-03-27 | End: 2018-03-27

## 2018-03-27 RX ORDER — FUROSEMIDE 40 MG
40 TABLET ORAL ONCE
Qty: 0 | Refills: 0 | Status: COMPLETED | OUTPATIENT
Start: 2018-03-27 | End: 2018-03-27

## 2018-03-27 RX ORDER — HEPARIN SODIUM 5000 [USP'U]/ML
3800 INJECTION INTRAVENOUS; SUBCUTANEOUS EVERY 6 HOURS
Qty: 0 | Refills: 0 | Status: DISCONTINUED | OUTPATIENT
Start: 2018-03-27 | End: 2018-03-28

## 2018-03-27 RX ORDER — CEFTRIAXONE 500 MG/1
1 INJECTION, POWDER, FOR SOLUTION INTRAMUSCULAR; INTRAVENOUS EVERY 24 HOURS
Qty: 0 | Refills: 0 | Status: DISCONTINUED | OUTPATIENT
Start: 2018-03-28 | End: 2018-03-31

## 2018-03-27 RX ORDER — NITROGLYCERIN 6.5 MG
1 CAPSULE, EXTENDED RELEASE ORAL EVERY 6 HOURS
Qty: 0 | Refills: 0 | Status: DISCONTINUED | OUTPATIENT
Start: 2018-03-27 | End: 2018-04-03

## 2018-03-27 RX ORDER — ONDANSETRON 8 MG/1
4 TABLET, FILM COATED ORAL EVERY 6 HOURS
Qty: 0 | Refills: 0 | Status: DISCONTINUED | OUTPATIENT
Start: 2018-03-27 | End: 2018-04-03

## 2018-03-27 RX ORDER — METOPROLOL TARTRATE 50 MG
50 TABLET ORAL DAILY
Qty: 0 | Refills: 0 | Status: DISCONTINUED | OUTPATIENT
Start: 2018-03-27 | End: 2018-04-01

## 2018-03-27 RX ORDER — CEFTRIAXONE 500 MG/1
1 INJECTION, POWDER, FOR SOLUTION INTRAMUSCULAR; INTRAVENOUS ONCE
Qty: 0 | Refills: 0 | Status: COMPLETED | OUTPATIENT
Start: 2018-03-27 | End: 2018-03-27

## 2018-03-27 RX ORDER — HEPARIN SODIUM 5000 [USP'U]/ML
3800 INJECTION INTRAVENOUS; SUBCUTANEOUS ONCE
Qty: 0 | Refills: 0 | Status: COMPLETED | OUTPATIENT
Start: 2018-03-27 | End: 2018-03-27

## 2018-03-27 RX ORDER — SODIUM POLYSTYRENE SULFONATE 4.1 MEQ/G
30 POWDER, FOR SUSPENSION ORAL ONCE
Qty: 0 | Refills: 0 | Status: COMPLETED | OUTPATIENT
Start: 2018-03-27 | End: 2018-03-27

## 2018-03-27 RX ORDER — SODIUM CHLORIDE 9 MG/ML
1000 INJECTION INTRAMUSCULAR; INTRAVENOUS; SUBCUTANEOUS
Qty: 0 | Refills: 0 | Status: COMPLETED | OUTPATIENT
Start: 2018-03-27 | End: 2018-03-27

## 2018-03-27 RX ORDER — HEPARIN SODIUM 5000 [USP'U]/ML
INJECTION INTRAVENOUS; SUBCUTANEOUS
Qty: 25000 | Refills: 0 | Status: DISCONTINUED | OUTPATIENT
Start: 2018-03-27 | End: 2018-03-28

## 2018-03-27 RX ORDER — CEFTRIAXONE 500 MG/1
INJECTION, POWDER, FOR SOLUTION INTRAMUSCULAR; INTRAVENOUS
Qty: 0 | Refills: 0 | Status: DISCONTINUED | OUTPATIENT
Start: 2018-03-27 | End: 2018-03-31

## 2018-03-27 RX ADMIN — Medication 325 MILLIGRAM(S): at 05:52

## 2018-03-27 RX ADMIN — Medication 325 MILLIGRAM(S): at 19:02

## 2018-03-27 RX ADMIN — RANOLAZINE 500 MILLIGRAM(S): 500 TABLET, FILM COATED, EXTENDED RELEASE ORAL at 05:52

## 2018-03-27 RX ADMIN — INSULIN GLARGINE 14 UNIT(S): 100 INJECTION, SOLUTION SUBCUTANEOUS at 08:33

## 2018-03-27 RX ADMIN — BUDESONIDE AND FORMOTEROL FUMARATE DIHYDRATE 2 PUFF(S): 160; 4.5 AEROSOL RESPIRATORY (INHALATION) at 05:52

## 2018-03-27 RX ADMIN — Medication 6: at 11:11

## 2018-03-27 RX ADMIN — HEPARIN SODIUM 3800 UNIT(S): 5000 INJECTION INTRAVENOUS; SUBCUTANEOUS at 17:31

## 2018-03-27 RX ADMIN — ONDANSETRON 4 MILLIGRAM(S): 8 TABLET, FILM COATED ORAL at 11:45

## 2018-03-27 RX ADMIN — Medication 100 MILLIGRAM(S): at 11:45

## 2018-03-27 RX ADMIN — RANOLAZINE 500 MILLIGRAM(S): 500 TABLET, FILM COATED, EXTENDED RELEASE ORAL at 18:59

## 2018-03-27 RX ADMIN — SODIUM POLYSTYRENE SULFONATE 30 GRAM(S): 4.1 POWDER, FOR SUSPENSION ORAL at 14:38

## 2018-03-27 RX ADMIN — SODIUM CHLORIDE 100 MILLILITER(S): 9 INJECTION INTRAMUSCULAR; INTRAVENOUS; SUBCUTANEOUS at 17:58

## 2018-03-27 RX ADMIN — TICAGRELOR 90 MILLIGRAM(S): 90 TABLET ORAL at 18:59

## 2018-03-27 RX ADMIN — CEFTRIAXONE 100 GRAM(S): 500 INJECTION, POWDER, FOR SOLUTION INTRAMUSCULAR; INTRAVENOUS at 20:37

## 2018-03-27 RX ADMIN — Medication 2: at 08:33

## 2018-03-27 RX ADMIN — ONDANSETRON 4 MILLIGRAM(S): 8 TABLET, FILM COATED ORAL at 17:47

## 2018-03-27 RX ADMIN — TICAGRELOR 90 MILLIGRAM(S): 90 TABLET ORAL at 05:52

## 2018-03-27 RX ADMIN — Medication 81 MILLIGRAM(S): at 11:45

## 2018-03-27 RX ADMIN — Medication 1 INCH(S): at 16:50

## 2018-03-27 RX ADMIN — BUDESONIDE AND FORMOTEROL FUMARATE DIHYDRATE 2 PUFF(S): 160; 4.5 AEROSOL RESPIRATORY (INHALATION) at 17:53

## 2018-03-27 RX ADMIN — Medication 40 MILLIGRAM(S): at 14:27

## 2018-03-27 RX ADMIN — ENOXAPARIN SODIUM 60 MILLIGRAM(S): 100 INJECTION SUBCUTANEOUS at 11:45

## 2018-03-27 RX ADMIN — HEPARIN SODIUM 750 UNIT(S)/HR: 5000 INJECTION INTRAVENOUS; SUBCUTANEOUS at 17:59

## 2018-03-27 RX ADMIN — Medication 40 MILLIGRAM(S): at 13:13

## 2018-03-27 NOTE — PROVIDER CONTACT NOTE (CRITICAL VALUE NOTIFICATION) - ASSESSMENT
pt axox3, denies any chest pain or shortness of breath, c/o nausea and not feeling well, s/p EKG & chest xray

## 2018-03-27 NOTE — PROGRESS NOTE ADULT - SUBJECTIVE AND OBJECTIVE BOX
c/o nausea, weakness  VSs  > troponin  no Changes on EKG  Probably troponin elevation related to >>Cr ARF  Acute on chronic systolic CHF  s/p NSTEMI  ARF/CKD  T2DM  ? MM  Zofran  Gentle hydration  discussed c Dr Mendoza  Prognosis guarded  Discussed c family

## 2018-03-27 NOTE — PROGRESS NOTE ADULT - SUBJECTIVE AND OBJECTIVE BOX
Patient with persistent nausea; coughing up whitish phlegm; no SOB; No chest pain;   CE done, noted increasing to 23;       MEDICATIONS  (STANDING):  allopurinol 100 milliGRAM(s) Oral daily  aspirin enteric coated 81 milliGRAM(s) Oral daily  buDESOnide 160 MICROgram(s)/formoterol 4.5 MICROgram(s) Inhaler 2 Puff(s) Inhalation two times a day  cefTRIAXone   IVPB      cefTRIAXone   IVPB 1 Gram(s) IV Intermittent once  dextrose 5%. 1000 milliLiter(s) (50 mL/Hr) IV Continuous <Continuous>  dextrose 50% Injectable 12.5 Gram(s) IV Push once  dextrose 50% Injectable 25 Gram(s) IV Push once  dextrose 50% Injectable 25 Gram(s) IV Push once  dorzolamide 2% Ophthalmic Solution 1 Drop(s) Both EYES three times a day  heparin  Infusion.  Unit(s)/Hr (7.5 mL/Hr) IV Continuous <Continuous>  heparin  Injectable 3800 Unit(s) IV Push once  hydrALAZINE 25 milliGRAM(s) Oral every 8 hours  insulin glargine Injectable (LANTUS) 14 Unit(s) SubCutaneous every morning  insulin lispro (HumaLOG) corrective regimen sliding scale   SubCutaneous three times a day before meals  metoprolol succinate ER 50 milliGRAM(s) Oral daily  nitroglycerin    2% Ointment 1 Inch(s) Transdermal every 6 hours  ranolazine 500 milliGRAM(s) Oral two times a day  senna 2 Tablet(s) Oral at bedtime  sodium bicarbonate 325 milliGRAM(s) Oral two times a day  ticagrelor 90 milliGRAM(s) Oral two times a day    MEDICATIONS  (PRN):  aluminum hydroxide/magnesium hydroxide/simethicone Suspension 30 milliLiter(s) Oral every 4 hours PRN Dyspepsia  dextrose Gel 1 Dose(s) Oral once PRN Blood Glucose LESS THAN 70 milliGRAM(s)/deciliter  glucagon  Injectable 1 milliGRAM(s) IntraMuscular once PRN Glucose LESS THAN 70 milligrams/deciliter  heparin  Injectable 3800 Unit(s) IV Push every 6 hours PRN For aPTT less than 40  ondansetron Injectable 4 milliGRAM(s) IV Push every 6 hours PRN Nausea and/or Vomiting      03-27-18 @ 07:01  -  03-27-18 @ 16:40  --------------------------------------------------------  IN: 0 mL / OUT: 100 mL / NET: -100 mL      PHYSICAL EXAM:      T(C): 35.6 (03-27-18 @ 16:10), Max: 36.2 (03-26-18 @ 23:30)  HR: 72 (03-27-18 @ 16:10) (69 - 83)  BP: 114/61 (03-27-18 @ 16:10) (101/56 - 130/76)  RR: 15 (03-27-18 @ 16:10) (14 - 17)  SpO2: 93% (03-27-18 @ 16:10) (93% - 100%)  Wt(kg): --  Respiratory: clear anteriorly, decreased BS at bases no rales   Cardiovascular: S1 S2  Gastrointestinal: soft NT ND +BS  Extremities:  1 edema                                    9.8    4.10  )-----------( 254      ( 27 Mar 2018 15:07 )             30.7     03-27    131<L>  |  93<L>  |  106<H>  ----------------------------<  142<H>  5.4<H>   |  25  |  4.90<H>    Ca    9.2      27 Mar 2018 15:07    TPro  9.1<H>  /  Alb  3.2<L>  /  TBili  0.4  /  DBili  x   /  AST  74<H>  /  ALT  48  /  AlkPhos  79  03-27      LIVER FUNCTIONS - ( 27 Mar 2018 15:07 )  Alb: 3.2 g/dL / Pro: 9.1 gm/dL / ALK PHOS: 79 U/L / ALT: 48 U/L / AST: 74 U/L / GGT: x             Assessment and Plan:  MAXIMINO; CKD 3; initially suspected CHF but EF nl mild DD; renal artery doppler without NAIF; paraprotein;  Noted CT chest 10 2017 c/w interstitial lung disease; serologic tests at the time negative; ACE level high normal.  Now with persistent nausea; elevated Troponin, no EKG changes;   Increasing cr trend suspected paraprotein and lasix effect;   Transfer to TELE; heparin drip;   Mckay I/O; will judiciously hydrate in attempt to flush potential paraprotein casts;   Prognosis guarded, will follow closely for HD indications, unclear if nausea due to cardiac vs uremic sx.  Discussed with KESHAWN Camilo.

## 2018-03-27 NOTE — CHART NOTE - NSCHARTNOTEFT_GEN_A_CORE
Follow up::    Patient still with continued nausea.   Critical Value troponin 23.9  BNP >626563                        9.8    4.10  )-----------( 254      ( 27 Mar 2018 15:07 )             30.7         131<L>  |  93<L>  |  106<H>  ----------------------------<  142<H>  5.4<H>   |  25  |  4.90<H>    Ca    9.2      27 Mar 2018 15:07    TPro  9.1<H>  /  Alb  3.2<L>  /  TBili  0.4  /  DBili  x   /  AST  74<H>  /  ALT  48  /  AlkPhos  79        Urinalysis Basic - ( 27 Mar 2018 15:24 )    Color: Yellow / Appearance: Slightly Turbid / S.005 / pH: x  Gluc: x / Ketone: Negative  / Bili: Negative / Urobili: Negative mg/dL   Blood: x / Protein: 100 mg/dL / Nitrite: Negative   Leuk Esterase: Moderate / RBC: >50 /HPF / WBC >50   Sq Epi: x / Non Sq Epi: Moderate / Bacteria: Many    PLAN  -------------  discussed critical values with Dr Mendoza/ Dr Walker/ Dr Torres   -transfer to telemetry  -heparin ggt  -stop lovenox  -ICU consult  -increase Betablockers to Toprol 50mg daily  -add nitro paste Q 6  -add ceftriaxone for ?UTI

## 2018-03-27 NOTE — PROGRESS NOTE ADULT - SUBJECTIVE AND OBJECTIVE BOX
NAUSEOUS; HAD RELIEF LAST NIGHT WITH ZOFRAN; WILL GIVE ANOTHER 4 MG IV DOSE  ON SALINE AS PER NEPHROLOGY  PULMONARY NOTED  STABLE CARDIOVASCULAR STATUS; CONTINUING WITH PRESENT MANAGEMENT.

## 2018-03-27 NOTE — CONSULT NOTE ADULT - SUBJECTIVE AND OBJECTIVE BOX
Patient is a 85y old  Female who presents with a chief complaint of SOB (20 Mar 2018 15:43)      HPI:  PMH ckd iddm htn chf cad stent X 2. patient admitted to hospital 1 week ago for sob and vasques.  on hospital day 4 patient developed intermittent nausea.  trop intially elevated on admission but trop rechecked was elevated to 23. patient denies cp at this time.  only complains of intermittent nausea relieved by zofran      Allergies    codeine (Other)    Intolerances        MEDICATIONS  (STANDING):  allopurinol 100 milliGRAM(s) Oral daily  aspirin enteric coated 81 milliGRAM(s) Oral daily  buDESOnide 160 MICROgram(s)/formoterol 4.5 MICROgram(s) Inhaler 2 Puff(s) Inhalation two times a day  cefTRIAXone   IVPB      cefTRIAXone   IVPB 1 Gram(s) IV Intermittent once  dextrose 5%. 1000 milliLiter(s) (50 mL/Hr) IV Continuous <Continuous>  dextrose 50% Injectable 12.5 Gram(s) IV Push once  dextrose 50% Injectable 25 Gram(s) IV Push once  dextrose 50% Injectable 25 Gram(s) IV Push once  dorzolamide 2% Ophthalmic Solution 1 Drop(s) Both EYES three times a day  heparin  Infusion.  Unit(s)/Hr (7.5 mL/Hr) IV Continuous <Continuous>  heparin  Injectable 3800 Unit(s) IV Push once  hydrALAZINE 25 milliGRAM(s) Oral every 8 hours  insulin glargine Injectable (LANTUS) 14 Unit(s) SubCutaneous every morning  insulin lispro (HumaLOG) corrective regimen sliding scale   SubCutaneous three times a day before meals  metoprolol succinate ER 50 milliGRAM(s) Oral daily  nitroglycerin    2% Ointment 1 Inch(s) Transdermal every 6 hours  ranolazine 500 milliGRAM(s) Oral two times a day  senna 2 Tablet(s) Oral at bedtime  sodium bicarbonate 325 milliGRAM(s) Oral two times a day  sodium chloride 0.9%. 1000 milliLiter(s) (100 mL/Hr) IV Continuous <Continuous>  ticagrelor 90 milliGRAM(s) Oral two times a day    MEDICATIONS  (PRN):  aluminum hydroxide/magnesium hydroxide/simethicone Suspension 30 milliLiter(s) Oral every 4 hours PRN Dyspepsia  dextrose Gel 1 Dose(s) Oral once PRN Blood Glucose LESS THAN 70 milliGRAM(s)/deciliter  glucagon  Injectable 1 milliGRAM(s) IntraMuscular once PRN Glucose LESS THAN 70 milligrams/deciliter  heparin  Injectable 3800 Unit(s) IV Push every 6 hours PRN For aPTT less than 40  ondansetron Injectable 4 milliGRAM(s) IV Push every 6 hours PRN Nausea and/or Vomiting      Daily     Daily     Drug Dosing Weight  Height (cm): 149.86 (20 Mar 2018 15:45)  Weight (kg): 60.6 (20 Mar 2018 15:45)  BMI (kg/m2): 27 (20 Mar 2018 15:45)  BSA (m2): 1.55 (20 Mar 2018 15:45)    PAST MEDICAL & SURGICAL HISTORY:  CAD S/P percutaneous coronary angioplasty  PVD (peripheral vascular disease)  HTN (hypertension)  DM (diabetes mellitus screen)  S/P foot surgery: left foot surgery with sage s/p fx.  S/P cataract surgery  S/P appendectomy  S/P hysterectomy  Amputation foot, unilat      FAMILY HISTORY:  No pertinent family history in first degree relatives      SOCIAL HISTORY: denies ivdu etoh smoking    ADVANCE DIRECTIVES: full code    REVIEW OF SYSTEMS:    CONSTITUTIONAL: No fever, weight loss, or fatigue  EYES: No eye pain, visual disturbances, or discharge  ENMT:  No difficulty hearing, tinnitus, vertigo; No sinus or throat pain  NECK: No pain or stiffness  BREASTS: No pain, masses, or nipple discharge  RESPIRATORY: No cough, wheezing, chills or hemoptysis; +shortness of breath  CARDIOVASCULAR: No chest pain, palpitations, dizziness, + leg swelling  GASTROINTESTINAL: No abdominal or epigastric pain. No nausea, vomiting, or hematemesis; No diarrhea or constipation. No melena or hematochezia.  GENITOURINARY: No dysuria, frequency, hematuria, or incontinence  NEUROLOGICAL: No headaches, memory loss, loss of strength, numbness, or tremors  SKIN: No itching, burning, rashes, or lesions   LYMPH NODES: No enlarged glands  MUSCULOSKELETAL: No joint pain or swelling; No muscle, back, or extremity pain  PSYCHIATRIC: No depression, anxiety, mood swings, or difficulty sleeping            ICU Vital Signs Last 24 Hrs  T(C): 35.6 (27 Mar 2018 16:10), Max: 36.2 (26 Mar 2018 23:30)  T(F): 96 (27 Mar 2018 16:10), Max: 97.1 (26 Mar 2018 23:30)  HR: 72 (27 Mar 2018 16:10) (69 - 83)  BP: 114/61 (27 Mar 2018 16:10) (101/56 - 130/76)  BP(mean): --  ABP: --  ABP(mean): --  RR: 15 (27 Mar 2018 16:10) (14 - 17)  SpO2: 93% (27 Mar 2018 16:10) (93% - 100%)          I&O's Detail    27 Mar 2018 07:01  -  27 Mar 2018 17:01  --------------------------------------------------------  IN:  Total IN: 0 mL    OUT:    Indwelling Catheter - Urethral: 100 mL  Total OUT: 100 mL    Total NET: -100 mL          PHYSICAL EXAM:    GENERAL: NAD, well-groomed, well-developed  HEAD:  Atraumatic, Normocephalic  EYES: right eye blindess.   ENMT: No tonsillar erythema, exudates, or enlargement; Moist mucous membranes, Good dentition, No lesions  NECK: Supple, No JVD, Normal thyroid  NERVOUS SYSTEM:  Alert & Oriented X3, Good concentration; Motor Strength 5/5 B/L upper and lower extremities; DTRs 2+ intact and symmetric  CHEST/LUNG: Clear to percussion bilaterally; No rales, rhonchi, wheezing, or rubs  HEART: Regular rate and rhythm; No murmurs, rubs, or gallops  ABDOMEN: Soft, Nontender, Nondistended; Bowel sounds present  EXTREMITIES:  2+ Peripheral Pulses,   LYMPH: No lymphadenopathy noted  SKIN: No rashes or lesions    LABS:  CBC Full  -  ( 27 Mar 2018 15:07 )  WBC Count : 4.10 K/uL  Hemoglobin : 9.8 g/dL  Hematocrit : 30.7 %  Platelet Count - Automated : 254 K/uL  Mean Cell Volume : 88.7 fl  Mean Cell Hemoglobin : 28.3 pg  Mean Cell Hemoglobin Concentration : 31.9 gm/dL  Auto Neutrophil # : 3.12 K/uL  Auto Lymphocyte # : 0.74 K/uL  Auto Monocyte # : 0.21 K/uL  Auto Eosinophil # : 0.00 K/uL  Auto Basophil # : 0.04 K/uL  Auto Neutrophil % : 76.0 %  Auto Lymphocyte % : 18.0 %  Auto Monocyte % : 5.0 %  Auto Eosinophil % : 0.0 %  Auto Basophil % : 1.0 %        131<L>  |  93<L>  |  106<H>  ----------------------------<  142<H>  5.4<H>   |  25  |  4.90<H>    Ca    9.2      27 Mar 2018 15:07    TPro  9.1<H>  /  Alb  3.2<L>  /  TBili  0.4  /  DBili  x   /  AST  74<H>  /  ALT  48  /  AlkPhos  79      CAPILLARY BLOOD GLUCOSE      POCT Blood Glucose.: 129 mg/dL (27 Mar 2018 16:29)      Urinalysis Basic - ( 27 Mar 2018 15:24 )    Color: Yellow / Appearance: Slightly Turbid / S.005 / pH: x  Gluc: x / Ketone: Negative  / Bili: Negative / Urobili: Negative mg/dL   Blood: x / Protein: 100 mg/dL / Nitrite: Negative   Leuk Esterase: Moderate / RBC: >50 /HPF / WBC >50   Sq Epi: x / Non Sq Epi: Moderate / Bacteria: Many      CARDIAC MARKERS ( 27 Mar 2018 15:07 )  23.900 ng/mL / x     / x     / x     / x          Troponin I, Serum: 23.900 ng/mL ( @ 15:07)    EKG: Right bundle, 1st degree av block    ECHO, US:< from: TTE Echo Doppler w/o Cont (18 @ 10:43) >  ummary:   1. Left ventricular ejection fraction, by visual estimation, is 50 to   55%.   2. Normal global left ventricular systolic function.   3. Mildly increased LV wall thickness.   4. Normal left ventricular internal cavity size.   5. Spectral Doppler shows impaired relaxation pattern of left   ventricular myocardial filling (Grade I diastolic dysfunction).   6. Normal right ventricular size and function.   7. The left atrium is normal in size.   8. Normal right atrial size.   9. The right atrium is normal in size.  10. There is no evidence of pericardial effusion.  11. Degenerative mitral valve.  12. Mild mitral valve regurgitation.  13. Structurally normal tricuspid valve, with normal leaflet excursion.  14. Mild aortic regurgitation.  15. Sclerotic aortic valve with normal opening.  16. Structurally normal pulmonic valve, with normal leaflet excursion.  17. Pulmonary hypertension is absent.  18. The main pulmonary artery is normal in size.  19. The right atrial pressure is normal.      < end of copied text >      RADIOLOGY:    CRITICAL CARE TIME SPENT:

## 2018-03-27 NOTE — CONSULT NOTE ADULT - ASSESSMENT
85 year old female with cad stent X2 iddm htn chf consulted for elevated trop. denies chest pain currently, only complaining of nausea.  likely acs vs uremia    Recommendations  aspirin  heparin drip  transfer to tele  hold anti htn meds  patient initially refused cath, may be amenable now.  if patient continues to refuse treatments, would consider [facility] care consult for hospice/GOC planning  ghentle hydration given already elevated bun/cr.  may require dialysis in near future renal following  trend trops  repeat tte for acute wall motion abnormalities  Does not require ICU level of care at this time.  Please reconsult critical care if patient's condition deteriorates.      I have decided to review and order of clinical lab tests  Relevant radiology studies were reviewed  Decision made to obtain available old records  Discussion of test results with performing physician  Review and summarization of old records obtaining history from EMR/NP   discussion of case with another health care provider NP  I have visualized independent imaging, EKGs and radiologies studies available but not otherwise officially read

## 2018-03-27 NOTE — CHART NOTE - NSCHARTNOTEFT_GEN_A_CORE
asked to see patient for complaint of nausea and vomiting]  Patient received zofran earlier with little relief.  patient states that she has been nauseas since Saturday.  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;       Patient with PMH CAD and recent PCI, DM, admitted with SOB, NSTEMI CHF  ICU Vital Signs Last 24 Hrs  T(C): 35.6 (27 Mar 2018 12:28), Max: 36.2 (26 Mar 2018 23:30)  T(F): 96 (27 Mar 2018 12:28), Max: 97.1 (26 Mar 2018 23:30)  HR: 72 (27 Mar 2018 12:28) (64 - 83)  BP: 116/56 (27 Mar 2018 12:28) (97/51 - 130/76)  BP(mean): --  ABP: --  ABP(mean): --  RR: 15 (27 Mar 2018 12:28) (15 - 18)  SpO2: 100% (27 Mar 2018 12:28) (95% - 100%)      03-27    132<L>  |  93<L>  |  102<H>  ----------------------------<  145<H>  5.4<H>   |  28  |  4.54<H>    Ca    9.6      27 Mar 2018 06:08    Trop 2.8-->2.6-->3.34 (3/21)    Serum Pro-Brain Natriuretic Peptide: 76872 pg/mL (03.20.18 @ 02:36)        CAPILLARY BLOOD GLUCOSE      POCT Blood Glucose.: 266 mg/dL (27 Mar 2018 11:08)  POCT Blood Glucose.: 175 mg/dL (27 Mar 2018 08:10)  POCT Blood Glucose.: 231 mg/dL (26 Mar 2018 21:19)  POCT Blood Glucose.: 283 mg/dL (26 Mar 2018 17:11)      ##Exam   GENERAL: sitting up , retching   NECK: Supple, No JVD, Normal thyroid  NERVOUS SYSTEM:  Alert & Oriented X3, Good concentration; Motor Strength 5/5 B/L upper and lower extremities; DTRs 2+ intact and symmetric  CHEST/LUNG: diminished throughout  HEART: Regular rate and rhythm;   ABDOMEN: Soft, Nontender, Nondistended; Bowel sounds present      ##PLAN  -Stat EKG  -stat chest Xray  -lasix 80 mg IVPx 1  - place mantilla (monitor urine output X 24-36 hours )   -kayexalate 30mg X 1(K 5.4, no BM in a few days)  -hold off on IV hydration (per nephrology)  -urine samples pending (paraprotein)   -will check lab work     will discuss with Dr Walker

## 2018-03-28 DIAGNOSIS — D89.2 HYPERGAMMAGLOBULINEMIA, UNSPECIFIED: ICD-10-CM

## 2018-03-28 LAB
ACE SERPL-CCNC: 60 U/L — SIGNIFICANT CHANGE UP (ref 14–82)
AMYLASE P1 CFR SERPL: 85 U/L — SIGNIFICANT CHANGE UP (ref 25–115)
ANION GAP SERPL CALC-SCNC: 10 MMOL/L — SIGNIFICANT CHANGE UP (ref 5–17)
ANION GAP SERPL CALC-SCNC: 12 MMOL/L — SIGNIFICANT CHANGE UP (ref 5–17)
APTT BLD: 182.9 SEC — CRITICAL HIGH (ref 27.5–37.4)
APTT BLD: 79.7 SEC — HIGH (ref 27.5–37.4)
BASOPHILS # BLD AUTO: 0.01 K/UL — SIGNIFICANT CHANGE UP (ref 0–0.2)
BASOPHILS NFR BLD AUTO: 0.2 % — SIGNIFICANT CHANGE UP (ref 0–2)
BUN SERPL-MCNC: 104 MG/DL — HIGH (ref 7–23)
BUN SERPL-MCNC: 108 MG/DL — HIGH (ref 7–23)
CALCIUM SERPL-MCNC: 8.6 MG/DL — SIGNIFICANT CHANGE UP (ref 8.5–10.1)
CALCIUM SERPL-MCNC: 8.8 MG/DL — SIGNIFICANT CHANGE UP (ref 8.5–10.1)
CHLORIDE SERPL-SCNC: 96 MMOL/L — SIGNIFICANT CHANGE UP (ref 96–108)
CHLORIDE SERPL-SCNC: 97 MMOL/L — SIGNIFICANT CHANGE UP (ref 96–108)
CO2 SERPL-SCNC: 25 MMOL/L — SIGNIFICANT CHANGE UP (ref 22–31)
CO2 SERPL-SCNC: 26 MMOL/L — SIGNIFICANT CHANGE UP (ref 22–31)
CREAT ?TM UR-MCNC: 123 MG/DL — SIGNIFICANT CHANGE UP
CREAT SERPL-MCNC: 4.91 MG/DL — HIGH (ref 0.5–1.3)
CREAT SERPL-MCNC: 5.13 MG/DL — HIGH (ref 0.5–1.3)
EOSINOPHIL # BLD AUTO: 0.03 K/UL — SIGNIFICANT CHANGE UP (ref 0–0.5)
EOSINOPHIL NFR BLD AUTO: 0.5 % — SIGNIFICANT CHANGE UP (ref 0–6)
EOSINOPHIL NFR URNS MANUAL: NEGATIVE — SIGNIFICANT CHANGE UP
GLUCOSE BLDC GLUCOMTR-MCNC: 139 MG/DL — HIGH (ref 70–99)
GLUCOSE BLDC GLUCOMTR-MCNC: 144 MG/DL — HIGH (ref 70–99)
GLUCOSE BLDC GLUCOMTR-MCNC: 179 MG/DL — HIGH (ref 70–99)
GLUCOSE BLDC GLUCOMTR-MCNC: 186 MG/DL — HIGH (ref 70–99)
GLUCOSE SERPL-MCNC: 133 MG/DL — HIGH (ref 70–99)
GLUCOSE SERPL-MCNC: 214 MG/DL — HIGH (ref 70–99)
HCT VFR BLD CALC: 26.8 % — LOW (ref 34.5–45)
HCT VFR BLD CALC: 29 % — LOW (ref 34.5–45)
HGB BLD-MCNC: 8.7 G/DL — LOW (ref 11.5–15.5)
HGB BLD-MCNC: 9.4 G/DL — LOW (ref 11.5–15.5)
IMM GRANULOCYTES NFR BLD AUTO: 0.2 % — SIGNIFICANT CHANGE UP (ref 0–1.5)
LIDOCAIN IGE QN: 95 U/L — SIGNIFICANT CHANGE UP (ref 73–393)
LYMPHOCYTES # BLD AUTO: 0.23 K/UL — LOW (ref 1–3.3)
LYMPHOCYTES # BLD AUTO: 4.2 % — LOW (ref 13–44)
MCHC RBC-ENTMCNC: 28.7 PG — SIGNIFICANT CHANGE UP (ref 27–34)
MCHC RBC-ENTMCNC: 28.8 PG — SIGNIFICANT CHANGE UP (ref 27–34)
MCHC RBC-ENTMCNC: 32.4 GM/DL — SIGNIFICANT CHANGE UP (ref 32–36)
MCHC RBC-ENTMCNC: 32.5 GM/DL — SIGNIFICANT CHANGE UP (ref 32–36)
MCV RBC AUTO: 88.4 FL — SIGNIFICANT CHANGE UP (ref 80–100)
MCV RBC AUTO: 89 FL — SIGNIFICANT CHANGE UP (ref 80–100)
MONOCYTES # BLD AUTO: 0.13 K/UL — SIGNIFICANT CHANGE UP (ref 0–0.9)
MONOCYTES NFR BLD AUTO: 2.4 % — SIGNIFICANT CHANGE UP (ref 2–14)
NEUTROPHILS # BLD AUTO: 5.06 K/UL — SIGNIFICANT CHANGE UP (ref 1.8–7.4)
NEUTROPHILS NFR BLD AUTO: 92.5 % — HIGH (ref 43–77)
NRBC # BLD: 2 /100 WBCS — HIGH (ref 0–0)
NRBC # BLD: 2 /100 WBCS — HIGH (ref 0–0)
PLATELET # BLD AUTO: 210 K/UL — SIGNIFICANT CHANGE UP (ref 150–400)
PLATELET # BLD AUTO: 235 K/UL — SIGNIFICANT CHANGE UP (ref 150–400)
POTASSIUM SERPL-MCNC: 3.9 MMOL/L — SIGNIFICANT CHANGE UP (ref 3.5–5.3)
POTASSIUM SERPL-MCNC: 4.2 MMOL/L — SIGNIFICANT CHANGE UP (ref 3.5–5.3)
POTASSIUM SERPL-SCNC: 3.9 MMOL/L — SIGNIFICANT CHANGE UP (ref 3.5–5.3)
POTASSIUM SERPL-SCNC: 4.2 MMOL/L — SIGNIFICANT CHANGE UP (ref 3.5–5.3)
RBC # BLD: 3.03 M/UL — LOW (ref 3.8–5.2)
RBC # BLD: 3.26 M/UL — LOW (ref 3.8–5.2)
RBC # FLD: 13.8 % — SIGNIFICANT CHANGE UP (ref 10.3–14.5)
RBC # FLD: 13.8 % — SIGNIFICANT CHANGE UP (ref 10.3–14.5)
SODIUM SERPL-SCNC: 132 MMOL/L — LOW (ref 135–145)
SODIUM SERPL-SCNC: 134 MMOL/L — LOW (ref 135–145)
SODIUM UR-SCNC: <20 MMOL/L — SIGNIFICANT CHANGE UP
TROPONIN I SERPL-MCNC: 19.6 NG/ML — HIGH (ref 0.01–0.04)
TROPONIN I SERPL-MCNC: 22.4 NG/ML — HIGH (ref 0.01–0.04)
TROPONIN I SERPL-MCNC: 22.7 NG/ML — HIGH (ref 0.01–0.04)
WBC # BLD: 3.81 K/UL — SIGNIFICANT CHANGE UP (ref 3.8–10.5)
WBC # BLD: 5.47 K/UL — SIGNIFICANT CHANGE UP (ref 3.8–10.5)
WBC # FLD AUTO: 3.81 K/UL — SIGNIFICANT CHANGE UP (ref 3.8–10.5)
WBC # FLD AUTO: 5.47 K/UL — SIGNIFICANT CHANGE UP (ref 3.8–10.5)

## 2018-03-28 PROCEDURE — 76700 US EXAM ABDOM COMPLETE: CPT | Mod: 26

## 2018-03-28 RX ORDER — HEPARIN SODIUM 5000 [USP'U]/ML
5000 INJECTION INTRAVENOUS; SUBCUTANEOUS EVERY 12 HOURS
Qty: 0 | Refills: 0 | Status: DISCONTINUED | OUTPATIENT
Start: 2018-03-28 | End: 2018-04-03

## 2018-03-28 RX ADMIN — Medication 100 MILLIGRAM(S): at 13:46

## 2018-03-28 RX ADMIN — Medication 1 INCH(S): at 20:30

## 2018-03-28 RX ADMIN — Medication 1 INCH(S): at 17:57

## 2018-03-28 RX ADMIN — Medication 50 MILLIGRAM(S): at 13:47

## 2018-03-28 RX ADMIN — ONDANSETRON 4 MILLIGRAM(S): 8 TABLET, FILM COATED ORAL at 18:12

## 2018-03-28 RX ADMIN — HEPARIN SODIUM 500 UNIT(S)/HR: 5000 INJECTION INTRAVENOUS; SUBCUTANEOUS at 13:48

## 2018-03-28 RX ADMIN — Medication 1 INCH(S): at 13:52

## 2018-03-28 RX ADMIN — Medication 1 INCH(S): at 04:41

## 2018-03-28 RX ADMIN — Medication 325 MILLIGRAM(S): at 09:31

## 2018-03-28 RX ADMIN — BUDESONIDE AND FORMOTEROL FUMARATE DIHYDRATE 2 PUFF(S): 160; 4.5 AEROSOL RESPIRATORY (INHALATION) at 06:35

## 2018-03-28 RX ADMIN — CEFTRIAXONE 100 GRAM(S): 500 INJECTION, POWDER, FOR SOLUTION INTRAMUSCULAR; INTRAVENOUS at 22:58

## 2018-03-28 RX ADMIN — Medication 2: at 11:51

## 2018-03-28 RX ADMIN — HEPARIN SODIUM 550 UNIT(S)/HR: 5000 INJECTION INTRAVENOUS; SUBCUTANEOUS at 03:37

## 2018-03-28 RX ADMIN — ONDANSETRON 4 MILLIGRAM(S): 8 TABLET, FILM COATED ORAL at 06:35

## 2018-03-28 RX ADMIN — TICAGRELOR 90 MILLIGRAM(S): 90 TABLET ORAL at 09:31

## 2018-03-28 RX ADMIN — HEPARIN SODIUM 0 UNIT(S)/HR: 5000 INJECTION INTRAVENOUS; SUBCUTANEOUS at 02:32

## 2018-03-28 RX ADMIN — Medication 81 MILLIGRAM(S): at 13:46

## 2018-03-28 RX ADMIN — Medication 1 INCH(S): at 00:57

## 2018-03-28 RX ADMIN — Medication 1 INCH(S): at 17:59

## 2018-03-28 RX ADMIN — Medication 1 INCH(S): at 13:51

## 2018-03-28 RX ADMIN — Medication 1 INCH(S): at 06:41

## 2018-03-28 NOTE — CONSULT NOTE ADULT - SUBJECTIVE AND OBJECTIVE BOX
Reason for Consultation: Paraprotenemia    HPI: Patient is a 85y Female seen on consultatioin for the evaluation and management of Paraprotenemia    Patient is a 85y old  Female who presents with a chief complaint of SOB   PMH ckd iddm htn chf cad stent X 2. patient admitted to hospital 1 week ago for sob and vasques.  on hospital day 4 patient developed intermittent nausea.  trop intially elevated on admission but trop rechecked was elevated to 23. patient denies cp at this time.  only complains of intermittent nausea relieved by zofran    Heme/onc Consult called for Elevated Proteins, M-Kahlil        PAST MEDICAL & SURGICAL HISTORY:  CAD S/P percutaneous coronary angioplasty  PVD (peripheral vascular disease)  HTN (hypertension)  DM (diabetes mellitus screen)  S/P foot surgery: left foot surgery with sage s/p fx.  S/P cataract surgery  S/P appendectomy  S/P hysterectomy  Amputation foot, unilat        MEDICATIONS  (STANDING):  allopurinol 100 milliGRAM(s) Oral daily  aspirin enteric coated 81 milliGRAM(s) Oral daily  buDESOnide 160 MICROgram(s)/formoterol 4.5 MICROgram(s) Inhaler 2 Puff(s) Inhalation two times a day  cefTRIAXone   IVPB      cefTRIAXone   IVPB 1 Gram(s) IV Intermittent every 24 hours  dextrose 5%. 1000 milliLiter(s) (50 mL/Hr) IV Continuous <Continuous>  dextrose 50% Injectable 12.5 Gram(s) IV Push once  dextrose 50% Injectable 25 Gram(s) IV Push once  dextrose 50% Injectable 25 Gram(s) IV Push once  dorzolamide 2% Ophthalmic Solution 1 Drop(s) Both EYES three times a day  heparin  Infusion.  Unit(s)/Hr (7.5 mL/Hr) IV Continuous <Continuous>  hydrALAZINE 25 milliGRAM(s) Oral every 8 hours  insulin glargine Injectable (LANTUS) 14 Unit(s) SubCutaneous every morning  insulin lispro (HumaLOG) corrective regimen sliding scale   SubCutaneous three times a day before meals  metoprolol succinate ER 50 milliGRAM(s) Oral daily  nitroglycerin    2% Ointment 1 Inch(s) Transdermal every 6 hours  ranolazine 500 milliGRAM(s) Oral two times a day  senna 2 Tablet(s) Oral at bedtime  sodium bicarbonate 325 milliGRAM(s) Oral two times a day  ticagrelor 90 milliGRAM(s) Oral two times a day    MEDICATIONS  (PRN):  aluminum hydroxide/magnesium hydroxide/simethicone Suspension 30 milliLiter(s) Oral every 4 hours PRN Dyspepsia  dextrose Gel 1 Dose(s) Oral once PRN Blood Glucose LESS THAN 70 milliGRAM(s)/deciliter  glucagon  Injectable 1 milliGRAM(s) IntraMuscular once PRN Glucose LESS THAN 70 milligrams/deciliter  heparin  Injectable 3800 Unit(s) IV Push every 6 hours PRN For aPTT less than 40  ondansetron Injectable 4 milliGRAM(s) IV Push every 6 hours PRN Nausea and/or Vomiting      Allergies    codeine (Other)    Intolerances        SOCIAL HISTORY:    Smoking Status: denies  Alcohol: denies  Marital Status: yes  Occupation: retired    FAMILY HISTORY:  No pertinent family history in first degree relatives    Vital Signs Last 24 Hrs  T(C): 36.1 (28 Mar 2018 10:10), Max: 36.6 (28 Mar 2018 00:37)  T(F): 97 (28 Mar 2018 10:10), Max: 97.8 (28 Mar 2018 00:37)  HR: 70 (28 Mar 2018 10:10) (61 - 72)  BP: 115/58 (28 Mar 2018 10:10) (101/55 - 123/65)  BP(mean): 82 (28 Mar 2018 10:10) (82 - 82)  RR: 18 (28 Mar 2018 10:10) (14 - 20)  SpO2: 97% (28 Mar 2018 10:10) (93% - 100%)    PHYSICAL EXAM:    general - AAO x 3  HEENT - No Icterus  CVS - RRR  RS - AE B/L  Abd - soft, NT          LABS:                        9.4    5.47  )-----------( 235      ( 28 Mar 2018 07:14 )             29.0     03-28    134<L>  |  97  |  108<H>  ----------------------------<  133<H>  3.9   |  25  |  5.13<H>    Ca    8.8      28 Mar 2018 07:14    TPro  9.1<H>  /  Alb  3.2<L>  /  TBili  0.4  /  DBili  x   /  AST  74<H>  /  ALT  48  /  AlkPhos  79  03-27    PTT - ( 28 Mar 2018 01:37 )  PTT:182.9 sec  Urinalysis Basic - ( 27 Mar 2018 15:24 )    Color: Yellow / Appearance: Slightly Turbid / S.005 / pH: x  Gluc: x / Ketone: Negative  / Bili: Negative / Urobili: Negative mg/dL   Blood: x / Protein: 100 mg/dL / Nitrite: Negative   Leuk Esterase: Moderate / RBC: >50 /HPF / WBC >50   Sq Epi: x / Non Sq Epi: Moderate / Bacteria: Many

## 2018-03-28 NOTE — PROGRESS NOTE ADULT - SUBJECTIVE AND OBJECTIVE BOX
Patient is a 85y old  Female who presents with a chief complaint of SOB (20 Mar 2018 15:43)      INTERVAL HPI/OVERNIGHT EVENTS: R forearm hematoma; Nausea, vomiting    MEDICATIONS  (STANDING):  allopurinol 100 milliGRAM(s) Oral daily  aspirin enteric coated 81 milliGRAM(s) Oral daily  buDESOnide 160 MICROgram(s)/formoterol 4.5 MICROgram(s) Inhaler 2 Puff(s) Inhalation two times a day  cefTRIAXone   IVPB      cefTRIAXone   IVPB 1 Gram(s) IV Intermittent every 24 hours  dextrose 5%. 1000 milliLiter(s) (50 mL/Hr) IV Continuous <Continuous>  dextrose 50% Injectable 12.5 Gram(s) IV Push once  dextrose 50% Injectable 25 Gram(s) IV Push once  dextrose 50% Injectable 25 Gram(s) IV Push once  dorzolamide 2% Ophthalmic Solution 1 Drop(s) Both EYES three times a day  heparin  Injectable 5000 Unit(s) SubCutaneous every 12 hours  hydrALAZINE 25 milliGRAM(s) Oral every 8 hours  insulin glargine Injectable (LANTUS) 14 Unit(s) SubCutaneous every morning  insulin lispro (HumaLOG) corrective regimen sliding scale   SubCutaneous three times a day before meals  metoprolol succinate ER 50 milliGRAM(s) Oral daily  nitroglycerin    2% Ointment 1 Inch(s) Transdermal every 6 hours  ranolazine 500 milliGRAM(s) Oral two times a day  senna 2 Tablet(s) Oral at bedtime  ticagrelor 90 milliGRAM(s) Oral two times a day    MEDICATIONS  (PRN):  aluminum hydroxide/magnesium hydroxide/simethicone Suspension 30 milliLiter(s) Oral every 4 hours PRN Dyspepsia  dextrose Gel 1 Dose(s) Oral once PRN Blood Glucose LESS THAN 70 milliGRAM(s)/deciliter  glucagon  Injectable 1 milliGRAM(s) IntraMuscular once PRN Glucose LESS THAN 70 milligrams/deciliter  ondansetron Injectable 4 milliGRAM(s) IV Push every 6 hours PRN Nausea and/or Vomiting      Allergies    codeine (Other)    Intolerances        REVIEW OF SYSTEMS:        HEENT - wnl  RESPIRATORY: No cough, wheezing, chills or hemoptysis; No shortness of breath  CARDIOVASCULAR: No chest pain, palpitations, dizziness, or leg swelling  GASTROINTESTINAL: No abdominal or epigastric pain. No nausea, vomiting, or hematemesis; No diarrhea or constipation. No melena or hematochezia.  GENITOURINARY: No dysuria, frequency, hematuria, or incontinence  SKIN: No itching, burning, rashes, or lesions   MUSCULOSKELETAL: No joint pain or swelling; No muscle, back, or extremity pain  PSYCHIATRIC: No depression, anxiety, mood swings, or difficulty sleeping        Vital Signs Last 24 Hrs  T(C): 36.3 (28 Mar 2018 18:56), Max: 36.6 (28 Mar 2018 00:37)  T(F): 97.4 (28 Mar 2018 18:56), Max: 97.8 (28 Mar 2018 00:37)  HR: 63 (28 Mar 2018 18:56) (61 - 70)  BP: 108/58 (28 Mar 2018 18:56) (101/55 - 123/60)  BP(mean): 82 (28 Mar 2018 10:10) (82 - 82)  RR: 18 (28 Mar 2018 18:56) (16 - 20)  SpO2: 100% (28 Mar 2018 18:56) (96% - 100%)    PHYSICAL EXAM:  general       HEENT wnl  CHEST/LUNG: Clear to percussion bilaterally; No rales, rhonchi, wheezing, or rubs  HEART: Regular rate and rhythm; No murmurs, rubs, or gallops  ABDOMEN: Soft, Nontender, Nondistended; Bowel sounds present  EXTREMITIES:  2+ Peripheral Pulses, No clubbing, cyanosis, or edema  LYMPH: No lymphadenopathy noted  SKIN: No rashes or lesions    LABS:                        9.4    5.47  )-----------( 235      ( 28 Mar 2018 07:14 )             29.0     03-28    134<L>  |  97  |  108<H>  ----------------------------<  133<H>  3.9   |  25  |  5.13<H>    Ca    8.8      28 Mar 2018 07:14    TPro  9.1<H>  /  Alb  3.2<L>  /  TBili  0.4  /  DBili  x   /  AST  74<H>  /  ALT  48  /  AlkPhos  79  03-27    PTT - ( 28 Mar 2018 10:55 )  PTT:79.7 sec  Urinalysis Basic - ( 27 Mar 2018 15:24 )    Color: Yellow / Appearance: Slightly Turbid / S.005 / pH: x  Gluc: x / Ketone: Negative  / Bili: Negative / Urobili: Negative mg/dL   Blood: x / Protein: 100 mg/dL / Nitrite: Negative   Leuk Esterase: Moderate / RBC: >50 /HPF / WBC >50   Sq Epi: x / Non Sq Epi: Moderate / Bacteria: Many      CAPILLARY BLOOD GLUCOSE      POCT Blood Glucose.: 179 mg/dL (28 Mar 2018 17:12)  POCT Blood Glucose.: 186 mg/dL (28 Mar 2018 11:43)  POCT Blood Glucose.: 144 mg/dL (28 Mar 2018 08:05)  POCT Blood Glucose.: 237 mg/dL (27 Mar 2018 22:57)      CARDIAC MARKERS ( 28 Mar 2018 16:05 )  19.600 ng/mL / x     / x     / x     / x      CARDIAC MARKERS ( 28 Mar 2018 07:14 )  22.400 ng/mL / x     / x     / x     / x      CARDIAC MARKERS ( 28 Mar 2018 00:12 )  22.700 ng/mL / x     / x     / x     / x      CARDIAC MARKERS ( 27 Mar 2018 15:07 )  23.900 ng/mL / x     / x     / x     / x                RADIOLOGY & ADDITIONAL TESTS: abd sono R pleural effusion , fatty liver    Imaging Personally Reviewed:  [y ] YES  [ ] NO    Consultant(s) Notes Reviewed:  [y ] YES  [ ] NO    Care Discussed with Consultants/Other Providers [ ] YES  [ ] NO    PROBLEMS:  HEART FAILURE, ELEVATED TROPONIN LEVEL  SHORTNESS OF BREATH; UNABLE TO AMBULATE  Heart failure  Paraproteinemia  ANSTEMI  ARF  Acute on chronic systolic HF  r/o MM  ? HD      Care discussed with family,         [  v]   yes  [  ]  No    imp:    stable[ ]    unstable[  ]     improving [   ]       unchanged  [  v]                Plans:  d/c heparin drip; Zofran  Prognosis guarded

## 2018-03-28 NOTE — PROGRESS NOTE ADULT - SUBJECTIVE AND OBJECTIVE BOX
EVENTS NOTED  NSTEMI  RESOLVING  NAUSEA; RESPONDS TO MEDICATION  COMPLAINS OF ABDOMINAL DISCOMFORT/ NO CHEST PAIN OR SOB     NO JVD  CLEAR LUNGS  SOFT S1 NO RUB  MILD ABDOMINAL TENDERNESS DIFFUSELY, NO MASS, REBOUND OR GUARDING  NO APPRECIABLE EDEMA    WBC 5  HGB 9  SMA7 PENDING  ECG; NORMAL SINUS RHYTHM; FIRST DEGREE AV BLOCK/BIFASCICULAR BLOCK; NO CHANGES    IMPRESSION:    NSTEMI  RENAL FAILURE  DIABETES  MELLITUS  ASHD  ABDOMINAL PAIN    CONTINUING MEDICAL MANAGEMENT OF NSTEMI; CONSIDER CATH IF/WHEN RENAL FUNCTION ALLOWS  AMYLASE, LIPASE, ABDOMINAL US ORDERED  GUARDED PROGNOSIS

## 2018-03-28 NOTE — CONSULT NOTE ADULT - PROBLEM SELECTOR RECOMMENDATION 9
- d/w patient in detail about blood findings, inlcuding possibility of a malignacy like multiple myeloma and need for BM Biopsy to confirm diagnosis  - patient states because of age she would not take chemotherapy, so because of this she does not want to pursue BM Biopsy  - patient understands consequences of missing diagnosis and not getting treatment  - suggest pallaitive care consult

## 2018-03-28 NOTE — CHART NOTE - NSCHARTNOTEFT_GEN_A_CORE
Per Nephrology note (3/27) 1000 ml/f/r; prognosis guarded; follow-up closely for HD indications; unclear if nausea due to cardiac vs uremic sx      Factors impacting intake: [ ] none [ X] nausea  [ ] vomiting [ ] diarrhea [ ] constipation  [ ]chewing problems [ ] swallowing issues  [ ] other:     Diet Prescription: Diet, Consistent Carbohydrate w/Evening Snack:   1000mL Fluid Restriction ([id number])  Low Sodium  Supplement Feeding Modality:  Oral  Ensure Enlive Cans or Servings Per Day:  1       Frequency:  Daily (03-24-18 @ 09:56)    Intake: poor; < 50% most meals    Current Weight:  59.7 kg (3/28); previous wt 60.6 kg (3/20)  % Weight Change: 1% loss x 8 days    Pertinent Medications: MEDICATIONS  (STANDING):  allopurinol 100 milliGRAM(s) Oral daily  aspirin enteric coated 81 milliGRAM(s) Oral daily  buDESOnide 160 MICROgram(s)/formoterol 4.5 MICROgram(s) Inhaler 2 Puff(s) Inhalation two times a day  cefTRIAXone   IVPB      cefTRIAXone   IVPB 1 Gram(s) IV Intermittent every 24 hours  dextrose 5%. 1000 milliLiter(s) (50 mL/Hr) IV Continuous <Continuous>  dextrose 50% Injectable 12.5 Gram(s) IV Push once  dextrose 50% Injectable 25 Gram(s) IV Push once  dextrose 50% Injectable 25 Gram(s) IV Push once  dorzolamide 2% Ophthalmic Solution 1 Drop(s) Both EYES three times a day  heparin  Infusion.  Unit(s)/Hr (7.5 mL/Hr) IV Continuous <Continuous>  hydrALAZINE 25 milliGRAM(s) Oral every 8 hours  insulin glargine Injectable (LANTUS) 14 Unit(s) SubCutaneous every morning  insulin lispro (HumaLOG) corrective regimen sliding scale   SubCutaneous three times a day before meals  metoprolol succinate ER 50 milliGRAM(s) Oral daily  nitroglycerin    2% Ointment 1 Inch(s) Transdermal every 6 hours  ranolazine 500 milliGRAM(s) Oral two times a day  senna 2 Tablet(s) Oral at bedtime  ticagrelor 90 milliGRAM(s) Oral two times a day    MEDICATIONS  (PRN):  aluminum hydroxide/magnesium hydroxide/simethicone Suspension 30 milliLiter(s) Oral every 4 hours PRN Dyspepsia  dextrose Gel 1 Dose(s) Oral once PRN Blood Glucose LESS THAN 70 milliGRAM(s)/deciliter  glucagon  Injectable 1 milliGRAM(s) IntraMuscular once PRN Glucose LESS THAN 70 milligrams/deciliter  heparin  Injectable 3800 Unit(s) IV Push every 6 hours PRN For aPTT less than 40  ondansetron Injectable 4 milliGRAM(s) IV Push every 6 hours PRN Nausea and/or Vomiting    Pertinent Labs: 03-28 Na134 mmol/L<L> Glu 133 mg/dL<H> K+ 3.9 mmol/L Cr  5.13 mg/dL<H>  mg/dL<H> 03-27 Alb 3.2 g/dL<L> 03-21 [id number] 8.1 %<H> 03-20 Chol 163 mg/dL  mg/dL HDL 42 mg/dL Trig 69 mg/dL     CAPILLARY BLOOD GLUCOSE      POCT Blood Glucose.: 186 mg/dL (28 Mar 2018 11:43)  POCT Blood Glucose.: 144 mg/dL (28 Mar 2018 08:05)  POCT Blood Glucose.: 237 mg/dL (27 Mar 2018 22:57)  POCT Blood Glucose.: 129 mg/dL (27 Mar 2018 16:29)    Skin: WDL; no edema noted  BM regular; last x 1 (3//26)    Estimated Needs:   [X ] no change since previous assessment  [ ] recalculated:     Previous Nutrition Diagnosis:   [ ] Inadequate Energy Intake [ ]Inadequate Oral Intake [ ] Excessive Energy Intake   [ ] Underweight [ ] Increased Nutrient Needs [ ] Overweight/Obesity   [ ] Altered GI Function [X ] Unintended Weight Loss [ ] Food & Nutrition Related Knowledge Deficit [ ] Malnutrition     Nutrition Diagnosis is [ X] ongoing  [ ] resolved [ ] not applicable     New Nutrition Diagnosis: [X ] not applicable      Interventions: continue CHO c snack, Low Na diets; 1000 ml fluid restriction as rx by MD  Recommend  [ ] Change Diet To:  [X ] Nutrition Supplement: d/c Ensure Enlive x 1/day; add Glucerna x 2/day (provides 440 kcal, 20 g protein)  as pt c DM  [ ] Nutrition Support  [ ] Other: once     Monitoring and Evaluation:   [X ] PO intake [ x ] Tolerance to diet prescription [ x ] weights [ x ] labs[ x ] follow up per protocol  [ ] other:

## 2018-03-28 NOTE — PROGRESS NOTE ADULT - SUBJECTIVE AND OBJECTIVE BOX
Nausea improved; denies SOB or chest pain;   Refusing BM bx;   Discussed importance in diagnosing underlying paraprotein related renal disease.    MEDICATIONS  (STANDING):  allopurinol 100 milliGRAM(s) Oral daily  aspirin enteric coated 81 milliGRAM(s) Oral daily  buDESOnide 160 MICROgram(s)/formoterol 4.5 MICROgram(s) Inhaler 2 Puff(s) Inhalation two times a day  cefTRIAXone   IVPB      cefTRIAXone   IVPB 1 Gram(s) IV Intermittent every 24 hours  dextrose 5%. 1000 milliLiter(s) (50 mL/Hr) IV Continuous <Continuous>  dextrose 50% Injectable 12.5 Gram(s) IV Push once  dextrose 50% Injectable 25 Gram(s) IV Push once  dextrose 50% Injectable 25 Gram(s) IV Push once  dorzolamide 2% Ophthalmic Solution 1 Drop(s) Both EYES three times a day  heparin  Infusion.  Unit(s)/Hr (7.5 mL/Hr) IV Continuous <Continuous>  hydrALAZINE 25 milliGRAM(s) Oral every 8 hours  insulin glargine Injectable (LANTUS) 14 Unit(s) SubCutaneous every morning  insulin lispro (HumaLOG) corrective regimen sliding scale   SubCutaneous three times a day before meals  metoprolol succinate ER 50 milliGRAM(s) Oral daily  nitroglycerin    2% Ointment 1 Inch(s) Transdermal every 6 hours  ranolazine 500 milliGRAM(s) Oral two times a day  senna 2 Tablet(s) Oral at bedtime  ticagrelor 90 milliGRAM(s) Oral two times a day    MEDICATIONS  (PRN):  aluminum hydroxide/magnesium hydroxide/simethicone Suspension 30 milliLiter(s) Oral every 4 hours PRN Dyspepsia  dextrose Gel 1 Dose(s) Oral once PRN Blood Glucose LESS THAN 70 milliGRAM(s)/deciliter  glucagon  Injectable 1 milliGRAM(s) IntraMuscular once PRN Glucose LESS THAN 70 milligrams/deciliter  heparin  Injectable 3800 Unit(s) IV Push every 6 hours PRN For aPTT less than 40  ondansetron Injectable 4 milliGRAM(s) IV Push every 6 hours PRN Nausea and/or Vomiting      03-27-18 @ 07:01  -  03-28-18 @ 07:00  --------------------------------------------------------  IN: 552.5 mL / OUT: 600 mL / NET: -47.5 mL    03-28-18 @ 07:01  -  03-28-18 @ 16:55  --------------------------------------------------------  IN: 360 mL / OUT: 0 mL / NET: 360 mL      PHYSICAL EXAM:      T(C): 36.1 (03-28-18 @ 10:10), Max: 36.6 (03-28-18 @ 00:37)  HR: 68 (03-28-18 @ 11:36) (61 - 70)  BP: 123/60 (03-28-18 @ 11:36) (101/55 - 123/65)  RR: 18 (03-28-18 @ 11:36) (16 - 20)  SpO2: 96% (03-28-18 @ 11:36) (96% - 99%)  Wt(kg): --  Respiratory: clear anteriorly, decreased at bases no rales or ronchi  Cardiovascular: S1 S2  Gastrointestinal: soft NT ND +BS  Extremities:   1 edema                                    9.4    5.47  )-----------( 235      ( 28 Mar 2018 07:14 )             29.0     03-28    134<L>  |  97  |  108<H>  ----------------------------<  133<H>  3.9   |  25  |  5.13<H>    Ca    8.8      28 Mar 2018 07:14    TPro  9.1<H>  /  Alb  3.2<L>  /  TBili  0.4  /  DBili  x   /  AST  74<H>  /  ALT  48  /  AlkPhos  79  03-27      LIVER FUNCTIONS - ( 27 Mar 2018 15:07 )  Alb: 3.2 g/dL / Pro: 9.1 gm/dL / ALK PHOS: 79 U/L / ALT: 48 U/L / AST: 74 U/L / GGT: x             Assessment and Plan:    MAXIMINO CKD 3; suspected paraprotein related disease; underlying HTN; diabetic nephrosclerosis;   Judicious IVF for now; discussed with patient the prospect of HD; benefits goals and risks;   She is currently undecided on the aggressiveness of her care. As a former nurse in the field of nephrology, she expressed understanding of the gravity of her illness.

## 2018-03-29 LAB
ANION GAP SERPL CALC-SCNC: 13 MMOL/L — SIGNIFICANT CHANGE UP (ref 5–17)
BUN SERPL-MCNC: 114 MG/DL — HIGH (ref 7–23)
C3 SERPL-MCNC: 110 MG/DL — SIGNIFICANT CHANGE UP (ref 80–180)
C4 SERPL-MCNC: 17 MG/DL — SIGNIFICANT CHANGE UP (ref 10–45)
CALCIUM SERPL-MCNC: 8.9 MG/DL — SIGNIFICANT CHANGE UP (ref 8.5–10.1)
CHLORIDE SERPL-SCNC: 96 MMOL/L — SIGNIFICANT CHANGE UP (ref 96–108)
CO2 SERPL-SCNC: 23 MMOL/L — SIGNIFICANT CHANGE UP (ref 22–31)
CREAT SERPL-MCNC: 5.53 MG/DL — HIGH (ref 0.5–1.3)
CULTURE RESULTS: NO GROWTH — SIGNIFICANT CHANGE UP
GLUCOSE BLDC GLUCOMTR-MCNC: 162 MG/DL — HIGH (ref 70–99)
GLUCOSE BLDC GLUCOMTR-MCNC: 203 MG/DL — HIGH (ref 70–99)
GLUCOSE BLDC GLUCOMTR-MCNC: 273 MG/DL — HIGH (ref 70–99)
GLUCOSE BLDC GLUCOMTR-MCNC: 299 MG/DL — HIGH (ref 70–99)
GLUCOSE SERPL-MCNC: 148 MG/DL — HIGH (ref 70–99)
HCT VFR BLD CALC: 32.1 % — LOW (ref 34.5–45)
HGB BLD-MCNC: 10.3 G/DL — LOW (ref 11.5–15.5)
KAPPA LC SER QL IFE: 18.2 MG/DL — HIGH (ref 0.33–1.94)
KAPPA/LAMBDA FREE LIGHT CHAIN RATIO, SERUM: 9.73 RATIO — HIGH (ref 0.26–1.65)
LAMBDA LC SER QL IFE: 1.87 MG/DL — SIGNIFICANT CHANGE UP (ref 0.57–2.63)
MCHC RBC-ENTMCNC: 28.8 PG — SIGNIFICANT CHANGE UP (ref 27–34)
MCHC RBC-ENTMCNC: 32.1 GM/DL — SIGNIFICANT CHANGE UP (ref 32–36)
MCV RBC AUTO: 89.7 FL — SIGNIFICANT CHANGE UP (ref 80–100)
NRBC # BLD: 2 /100 WBCS — HIGH (ref 0–0)
PLATELET # BLD AUTO: 268 K/UL — SIGNIFICANT CHANGE UP (ref 150–400)
POTASSIUM SERPL-MCNC: 5 MMOL/L — SIGNIFICANT CHANGE UP (ref 3.5–5.3)
POTASSIUM SERPL-SCNC: 5 MMOL/L — SIGNIFICANT CHANGE UP (ref 3.5–5.3)
RBC # BLD: 3.58 M/UL — LOW (ref 3.8–5.2)
RBC # FLD: 14.1 % — SIGNIFICANT CHANGE UP (ref 10.3–14.5)
SODIUM SERPL-SCNC: 132 MMOL/L — LOW (ref 135–145)
SPECIMEN SOURCE: SIGNIFICANT CHANGE UP
TROPONIN I SERPL-MCNC: 16.4 NG/ML — HIGH (ref 0.01–0.04)
WBC # BLD: 7.16 K/UL — SIGNIFICANT CHANGE UP (ref 3.8–10.5)
WBC # FLD AUTO: 7.16 K/UL — SIGNIFICANT CHANGE UP (ref 3.8–10.5)

## 2018-03-29 PROCEDURE — 74018 RADEX ABDOMEN 1 VIEW: CPT | Mod: 26

## 2018-03-29 RX ORDER — SODIUM CHLORIDE 9 MG/ML
1000 INJECTION INTRAMUSCULAR; INTRAVENOUS; SUBCUTANEOUS
Qty: 0 | Refills: 0 | Status: DISCONTINUED | OUTPATIENT
Start: 2018-03-29 | End: 2018-04-01

## 2018-03-29 RX ORDER — LACTULOSE 10 G/15ML
30 SOLUTION ORAL ONCE
Qty: 0 | Refills: 0 | Status: COMPLETED | OUTPATIENT
Start: 2018-03-29 | End: 2018-03-29

## 2018-03-29 RX ADMIN — ONDANSETRON 4 MILLIGRAM(S): 8 TABLET, FILM COATED ORAL at 13:03

## 2018-03-29 RX ADMIN — Medication 6: at 18:17

## 2018-03-29 RX ADMIN — CEFTRIAXONE 100 GRAM(S): 500 INJECTION, POWDER, FOR SOLUTION INTRAMUSCULAR; INTRAVENOUS at 22:22

## 2018-03-29 RX ADMIN — INSULIN GLARGINE 14 UNIT(S): 100 INJECTION, SOLUTION SUBCUTANEOUS at 10:13

## 2018-03-29 RX ADMIN — Medication 25 MILLIGRAM(S): at 09:37

## 2018-03-29 RX ADMIN — TICAGRELOR 90 MILLIGRAM(S): 90 TABLET ORAL at 09:38

## 2018-03-29 RX ADMIN — Medication 30 MILLILITER(S): at 18:36

## 2018-03-29 RX ADMIN — SODIUM CHLORIDE 60 MILLILITER(S): 9 INJECTION INTRAMUSCULAR; INTRAVENOUS; SUBCUTANEOUS at 11:42

## 2018-03-29 RX ADMIN — Medication 25 MILLIGRAM(S): at 18:09

## 2018-03-29 RX ADMIN — BUDESONIDE AND FORMOTEROL FUMARATE DIHYDRATE 2 PUFF(S): 160; 4.5 AEROSOL RESPIRATORY (INHALATION) at 05:57

## 2018-03-29 RX ADMIN — TICAGRELOR 90 MILLIGRAM(S): 90 TABLET ORAL at 18:09

## 2018-03-29 RX ADMIN — Medication 2: at 07:30

## 2018-03-29 RX ADMIN — HEPARIN SODIUM 5000 UNIT(S): 5000 INJECTION INTRAVENOUS; SUBCUTANEOUS at 18:08

## 2018-03-29 RX ADMIN — RANOLAZINE 500 MILLIGRAM(S): 500 TABLET, FILM COATED, EXTENDED RELEASE ORAL at 18:09

## 2018-03-29 RX ADMIN — Medication 10 MILLIGRAM(S): at 06:01

## 2018-03-29 RX ADMIN — Medication 100 MILLIGRAM(S): at 11:42

## 2018-03-29 RX ADMIN — BUDESONIDE AND FORMOTEROL FUMARATE DIHYDRATE 2 PUFF(S): 160; 4.5 AEROSOL RESPIRATORY (INHALATION) at 18:10

## 2018-03-29 RX ADMIN — Medication 6: at 11:42

## 2018-03-29 RX ADMIN — Medication 50 MILLIGRAM(S): at 09:38

## 2018-03-29 RX ADMIN — Medication 81 MILLIGRAM(S): at 11:42

## 2018-03-29 RX ADMIN — RANOLAZINE 500 MILLIGRAM(S): 500 TABLET, FILM COATED, EXTENDED RELEASE ORAL at 05:47

## 2018-03-29 RX ADMIN — LACTULOSE 30 GRAM(S): 10 SOLUTION ORAL at 14:43

## 2018-03-29 RX ADMIN — HEPARIN SODIUM 5000 UNIT(S): 5000 INJECTION INTRAVENOUS; SUBCUTANEOUS at 05:48

## 2018-03-29 NOTE — CHART NOTE - NSCHARTNOTEFT_GEN_A_CORE
Pt seen and examined at bedside with Dr. Winkler, discussed in length the need for HD and to establish access. Pt agrees to having permacath or samy if needed acutely. Pt NPO after midnight for procedure.

## 2018-03-29 NOTE — PROGRESS NOTE ADULT - SUBJECTIVE AND OBJECTIVE BOX
Patient is a 85y old  Female who presents with a chief complaint of SOB (20 Mar 2018 15:43)      INTERVAL HPI/OVERNIGHT EVENTS: "I have hot flashes" "I couldn't move my bowel p enema"    MEDICATIONS  (STANDING):  allopurinol 100 milliGRAM(s) Oral daily  aspirin enteric coated 81 milliGRAM(s) Oral daily  buDESOnide 160 MICROgram(s)/formoterol 4.5 MICROgram(s) Inhaler 2 Puff(s) Inhalation two times a day  cefTRIAXone   IVPB      cefTRIAXone   IVPB 1 Gram(s) IV Intermittent every 24 hours  dextrose 5%. 1000 milliLiter(s) (50 mL/Hr) IV Continuous <Continuous>  dextrose 50% Injectable 12.5 Gram(s) IV Push once  dextrose 50% Injectable 25 Gram(s) IV Push once  dextrose 50% Injectable 25 Gram(s) IV Push once  dorzolamide 2% Ophthalmic Solution 1 Drop(s) Both EYES three times a day  heparin  Injectable 5000 Unit(s) SubCutaneous every 12 hours  hydrALAZINE 25 milliGRAM(s) Oral every 8 hours  insulin glargine Injectable (LANTUS) 14 Unit(s) SubCutaneous every morning  insulin lispro (HumaLOG) corrective regimen sliding scale   SubCutaneous three times a day before meals  metoprolol succinate ER 50 milliGRAM(s) Oral daily  nitroglycerin    2% Ointment 1 Inch(s) Transdermal every 6 hours  ranolazine 500 milliGRAM(s) Oral two times a day  senna 2 Tablet(s) Oral at bedtime  ticagrelor 90 milliGRAM(s) Oral two times a day    MEDICATIONS  (PRN):  aluminum hydroxide/magnesium hydroxide/simethicone Suspension 30 milliLiter(s) Oral every 4 hours PRN Dyspepsia  dextrose Gel 1 Dose(s) Oral once PRN Blood Glucose LESS THAN 70 milliGRAM(s)/deciliter  glucagon  Injectable 1 milliGRAM(s) IntraMuscular once PRN Glucose LESS THAN 70 milligrams/deciliter  ondansetron Injectable 4 milliGRAM(s) IV Push every 6 hours PRN Nausea and/or Vomiting      Allergies    codeine (Other)    Intolerances        REVIEW OF SYSTEMS:  weakness, << appetite, constipation, symptoms of > intoxication,  "I want to go home"        Vital Signs Last 24 Hrs  T(C): 36.6 (29 Mar 2018 05:31), Max: 36.7 (28 Mar 2018 23:41)  T(F): 97.8 (29 Mar 2018 05:31), Max: 98 (28 Mar 2018 23:41)  HR: 67 (29 Mar 2018 05:31) (63 - 70)  BP: 119/60 (29 Mar 2018 05:31) (108/58 - 123/60)  BP(mean): 82 (28 Mar 2018 10:10) (82 - 82)  RR: 18 (29 Mar 2018 05:31) (18 - 18)  SpO2: 98% (29 Mar 2018 05:31) (96% - 100%)    PHYSICAL EXAM:  general       HEENT wnl  CHEST/LUNG: < BS at bases  HEART: Regular rate soft sounds  ABDOMEN: Soft, Nontender, Nondistended; Bowel sounds present  EXTREMITIES:  2+ Peripheral Pulses, No clubbing, cyanosis, or edema  LYMPH: No lymphadenopathy noted  SKIN: No rashes or lesions    LABS:                        10.3   7.16  )-----------( 268      ( 29 Mar 2018 06:15 )             32.1     03-29    132<L>  |  96  |  114<H>  ----------------------------<  148<H>  5.0   |  23  |  5.53<H>    Ca    8.9      29 Mar 2018 06:15    TPro  9.1<H>  /  Alb  3.2<L>  /  TBili  0.4  /  DBili  x   /  AST  74<H>  /  ALT  48  /  AlkPhos  79  03-27    PTT - ( 28 Mar 2018 10:55 )  PTT:79.7 sec  Urinalysis Basic - ( 27 Mar 2018 15:24 )    Color: Yellow / Appearance: Slightly Turbid / S.005 / pH: x  Gluc: x / Ketone: Negative  / Bili: Negative / Urobili: Negative mg/dL   Blood: x / Protein: 100 mg/dL / Nitrite: Negative   Leuk Esterase: Moderate / RBC: >50 /HPF / WBC >50   Sq Epi: x / Non Sq Epi: Moderate / Bacteria: Many      CAPILLARY BLOOD GLUCOSE      POCT Blood Glucose.: 162 mg/dL (29 Mar 2018 07:29)  POCT Blood Glucose.: 139 mg/dL (28 Mar 2018 22:44)  POCT Blood Glucose.: 179 mg/dL (28 Mar 2018 17:12)  POCT Blood Glucose.: 186 mg/dL (28 Mar 2018 11:43)      CARDIAC MARKERS ( 29 Mar 2018 06:15 )  16.400 ng/mL / x     / x     / x     / x      CARDIAC MARKERS ( 28 Mar 2018 16:05 )  19.600 ng/mL / x     / x     / x     / x      CARDIAC MARKERS ( 28 Mar 2018 07:14 )  22.400 ng/mL / x     / x     / x     / x      CARDIAC MARKERS ( 28 Mar 2018 00:12 )  22.700 ng/mL / x     / x     / x     / x      CARDIAC MARKERS ( 27 Mar 2018 15:07 )  23.900 ng/mL / x     / x     / x     / x                RADIOLOGY & ADDITIONAL TESTS:    Imaging Personally Reviewed:  [y ] YES  [ ] NO    Consultant(s) Notes Reviewed:  [y ] YES  [ ] NO    Care Discussed with Consultants/Other Providers [ ] YES  [ ] NO    PROBLEMS:  HEART FAILURE, ELEVATED TROPONIN LEVEL  SHORTNESS OF BREATH; UNABLE TO AMBULATE  Heart failure, aute on chronic systolic HF  Paraproteinemia  ARF  NSTEMI      Care discussed with family,         [  ]   yes  [  ]  No    imp:    stable[ ]    unstable[ v ]     improving [   ]       unchanged  [  ]                Plans:  restart gentle hydration, mild sedation Ativan. Consider HD

## 2018-03-29 NOTE — PROGRESS NOTE ADULT - SUBJECTIVE AND OBJECTIVE BOX
NAUSEOUS  FALLING TROPONIN  RISING BUN/CR:  NO CHEST PAIN  CONSTIPATED  CLEAR LUNGS  NO RUB  SOFT ABDOMEN  MINIMAL EDEMA    IMPRESSION:    RENAL FAILURE  NSTEMI  ASHD  CONSTIPATON/NAUSEA ?SECONDARY TO AZOTEMIA?    SUPPORTIVE CARE  CONTINUING PRESENT CV CARE; NAUSEOUS  FALLING TROPONIN  RISING BUN/CR:  NO CHEST PAIN  CONSTIPATED  CLEAR LUNGS  NO RUB  SOFT ABDOMEN  MINIMAL EDEMA    IMPRESSION:    RENAL FAILURE  NSTEMI  ASHD  CONSTIPATON/NAUSEA ?SECONDARY TO AZOTEMIA?    SUPPORTIVE CARE  CONTINUING PRESENT CV CARE;   KUB

## 2018-03-29 NOTE — PROGRESS NOTE ADULT - SUBJECTIVE AND OBJECTIVE BOX
JULIAN MEDELLIN  85y  Female    Patient is a 85y old  Female who presents with a chief complaint of SOB (20 Mar 2018 15:43)      HPI:  PT C hX dm cad pci recently, for 2 days > SOB on exertion, No CP, No fever, chills, N/V/C/D (20 Mar 2018 09:19)    patient complains of constipation, abdominal pain, nausea and SOB.       PAST MEDICAL & SURGICAL HISTORY:  CAD S/P percutaneous coronary angioplasty  PVD (peripheral vascular disease)  HTN (hypertension)  DM (diabetes mellitus screen)  S/P foot surgery: left foot surgery with sage s/p fx.  S/P cataract surgery  S/P appendectomy  S/P hysterectomy  Amputation foot, unilat          PHYSICAL EXAM:    T(C): 37 (18 @ 10:23), Max: 37 (18 @ 10:23)  HR: 63 (18 @ 10:23) (63 - 67)  BP: 108/53 (18 @ 10:23) (108/53 - 119/60)  RR: 16 (18 @ 10:23) (16 - 18)  SpO2: 98% (18 @ 10:23) (98% - 100%)  Wt(kg): --    I&O's Detail    28 Mar 2018 07:01  -  29 Mar 2018 07:00  --------------------------------------------------------  IN:    Oral Fluid: 660 mL  Total IN: 660 mL    OUT:    Indwelling Catheter - Urethral: 350 mL  Total OUT: 350 mL    Total NET: 310 mL          Respiratory: clear anteriorly, decreased BS at bases  Cardiovascular: S1 S2  Gastrointestinal: soft NT ND +BS  Extremities: edema   Neuro: Awake and alert    MEDICATIONS  (STANDING):  allopurinol 100 milliGRAM(s) Oral daily  aspirin enteric coated 81 milliGRAM(s) Oral daily  buDESOnide 160 MICROgram(s)/formoterol 4.5 MICROgram(s) Inhaler 2 Puff(s) Inhalation two times a day  cefTRIAXone   IVPB      cefTRIAXone   IVPB 1 Gram(s) IV Intermittent every 24 hours  dextrose 5%. 1000 milliLiter(s) (50 mL/Hr) IV Continuous <Continuous>  dextrose 50% Injectable 12.5 Gram(s) IV Push once  dextrose 50% Injectable 25 Gram(s) IV Push once  dextrose 50% Injectable 25 Gram(s) IV Push once  dorzolamide 2% Ophthalmic Solution 1 Drop(s) Both EYES three times a day  heparin  Injectable 5000 Unit(s) SubCutaneous every 12 hours  hydrALAZINE 25 milliGRAM(s) Oral every 8 hours  insulin glargine Injectable (LANTUS) 14 Unit(s) SubCutaneous every morning  insulin lispro (HumaLOG) corrective regimen sliding scale   SubCutaneous three times a day before meals  metoprolol succinate ER 50 milliGRAM(s) Oral daily  nitroglycerin    2% Ointment 1 Inch(s) Transdermal every 6 hours  ranolazine 500 milliGRAM(s) Oral two times a day  senna 2 Tablet(s) Oral at bedtime  sodium chloride 0.9%. 1000 milliLiter(s) (60 mL/Hr) IV Continuous <Continuous>  ticagrelor 90 milliGRAM(s) Oral two times a day    MEDICATIONS  (PRN):  aluminum hydroxide/magnesium hydroxide/simethicone Suspension 30 milliLiter(s) Oral every 4 hours PRN Dyspepsia  dextrose Gel 1 Dose(s) Oral once PRN Blood Glucose LESS THAN 70 milliGRAM(s)/deciliter  glucagon  Injectable 1 milliGRAM(s) IntraMuscular once PRN Glucose LESS THAN 70 milligrams/deciliter  ondansetron Injectable 4 milliGRAM(s) IV Push every 6 hours PRN Nausea and/or Vomiting                            10.3   7.16  )-----------( 268      ( 29 Mar 2018 06:15 )             32.1       03-    132<L>  |  96  |  114<H>  ----------------------------<  148<H>  5.0   |  23  |  5.53<H>    Ca    8.9      29 Mar 2018 06:15    TPro  9.1<H>  /  Alb  3.2<L>  /  TBili  0.4  /  DBili  x   /  AST  74<H>  /  ALT  48  /  AlkPhos  79  03-27      Urinalysis Basic - ( 27 Mar 2018 15:24 )    Color: Yellow / Appearance: Slightly Turbid / S.005 / pH: x  Gluc: x / Ketone: Negative  / Bili: Negative / Urobili: Negative mg/dL   Blood: x / Protein: 100 mg/dL / Nitrite: Negative   Leuk Esterase: Moderate / RBC: >50 /HPF / WBC >50   Sq Epi: x / Non Sq Epi: Moderate / Bacteria: Many    < from: US Abdomen Complete (18 @ 10:05) >  EXAM:  US ABDOMINAL COMPLETE                            PROCEDURE DATE:  2018          INTERPRETATION:  CLINICAL INFORMATION: Diffuse abdominal pain, renal   failure    COMPARISON: 3/21/2018    TECHNIQUE: Sonography of the abdomen.     FINDINGS:    Liver: Numerous echogenic foci, suggestive of hepatitis.    Bile ducts: Normal caliber. Common bile duct measures 4 mm.     Gallbladder: No cholelithiasis. Nonspecific wall thickening. Negative   sonographic Otero's sign.        Pancreas: Visualized portions are within normal limits.    Spleen: 5.2 cm. Within normal limits.    Right kidney: 9.6 cm. Increased echogenicity. 1.6 cm cyst. No   hydronephrosis.        Left kidney: 9.8 cm. Increased echogenicity. 2.8 cm cyst.  No   hydronephrosis.        Ascites: Trace.    Aorta and IVC: Aortic calcifications. Visualized portions are otherwise   within normal limits.    There is a right pleural effusion.    IMPRESSION:     Appearance of liver suggestive of hepatitis.  No cholelithiasis or biliary ductal dilatation.  Bilaterally increased renal cortical echogenicity compatible with medical   renal disease.  No hydronephrosis.  Right pleural effusion.

## 2018-03-29 NOTE — PROGRESS NOTE ADULT - SUBJECTIVE AND OBJECTIVE BOX
Patient lying in bed    Vital Signs Last 24 Hrs  T(C): 36.6 (29 Mar 2018 05:31), Max: 36.7 (28 Mar 2018 23:41)  T(F): 97.8 (29 Mar 2018 05:31), Max: 98 (28 Mar 2018 23:41)  HR: 67 (29 Mar 2018 05:31) (63 - 68)  BP: 119/60 (29 Mar 2018 05:31) (108/58 - 123/60)  BP(mean): --  RR: 18 (29 Mar 2018 05:31) (18 - 18)  SpO2: 98% (29 Mar 2018 05:31) (96% - 100%)    PHYSICAL EXAM:    general - AAO x 3  HEENT - No Icterus  CVS - RRR  RS - AE B/L  Abd - soft, NT  Ext - Pulses +        LABS:                        10.3   7.16  )-----------( 268      ( 29 Mar 2018 06:15 )             32.1     03-29    132<L>  |  96  |  114<H>  ----------------------------<  148<H>  5.0   |  23  |  5.53<H>    Ca    8.9      29 Mar 2018 06:15    TPro  9.1<H>  /  Alb  3.2<L>  /  TBili  0.4  /  DBili  x   /  AST  74<H>  /  ALT  48  /  AlkPhos  79  03-27    PTT - ( 28 Mar 2018 10:55 )  PTT:79.7 sec  Urinalysis Basic - ( 27 Mar 2018 15:24 )    Color: Yellow / Appearance: Slightly Turbid / S.005 / pH: x  Gluc: x / Ketone: Negative  / Bili: Negative / Urobili: Negative mg/dL   Blood: x / Protein: 100 mg/dL / Nitrite: Negative   Leuk Esterase: Moderate / RBC: >50 /HPF / WBC >50   Sq Epi: x / Non Sq Epi: Moderate / Bacteria: Many          RADIOLOGY & ADDITIONAL STUDIES:

## 2018-03-30 LAB
ALBUMIN SERPL ELPH-MCNC: 2.7 G/DL — LOW (ref 3.3–5)
ALP SERPL-CCNC: 106 U/L — SIGNIFICANT CHANGE UP (ref 40–120)
ALT FLD-CCNC: 296 U/L — HIGH (ref 12–78)
ANION GAP SERPL CALC-SCNC: 14 MMOL/L — SIGNIFICANT CHANGE UP (ref 5–17)
AST SERPL-CCNC: 325 U/L — HIGH (ref 15–37)
BILIRUB SERPL-MCNC: 0.3 MG/DL — SIGNIFICANT CHANGE UP (ref 0.2–1.2)
BUN SERPL-MCNC: 120 MG/DL — HIGH (ref 7–23)
CALCIUM SERPL-MCNC: 8.5 MG/DL — SIGNIFICANT CHANGE UP (ref 8.5–10.1)
CHLORIDE SERPL-SCNC: 97 MMOL/L — SIGNIFICANT CHANGE UP (ref 96–108)
CO2 SERPL-SCNC: 21 MMOL/L — LOW (ref 22–31)
CREAT SERPL-MCNC: 5.94 MG/DL — HIGH (ref 0.5–1.3)
GLUCOSE BLDC GLUCOMTR-MCNC: 115 MG/DL — HIGH (ref 70–99)
GLUCOSE BLDC GLUCOMTR-MCNC: 145 MG/DL — HIGH (ref 70–99)
GLUCOSE BLDC GLUCOMTR-MCNC: 163 MG/DL — HIGH (ref 70–99)
GLUCOSE BLDC GLUCOMTR-MCNC: 175 MG/DL — HIGH (ref 70–99)
GLUCOSE SERPL-MCNC: 140 MG/DL — HIGH (ref 70–99)
HCT VFR BLD CALC: 28.2 % — LOW (ref 34.5–45)
HGB BLD-MCNC: 9 G/DL — LOW (ref 11.5–15.5)
IGG SERPL-MCNC: 3063 MG/DL — HIGH (ref 700–1600)
IGG1 SER-MCNC: 2564 MG/DL — HIGH (ref 248–810)
IGG2 SER-MCNC: 117 MG/DL — LOW (ref 130–555)
IGG3 SER-MCNC: 49 MG/DL — SIGNIFICANT CHANGE UP (ref 15–102)
IGG4 SER-MCNC: 27 MG/DL — SIGNIFICANT CHANGE UP (ref 2–96)
MCHC RBC-ENTMCNC: 28.2 PG — SIGNIFICANT CHANGE UP (ref 27–34)
MCHC RBC-ENTMCNC: 31.9 GM/DL — LOW (ref 32–36)
MCV RBC AUTO: 88.4 FL — SIGNIFICANT CHANGE UP (ref 80–100)
NRBC # BLD: 5 /100 WBCS — HIGH (ref 0–0)
PLATELET # BLD AUTO: 278 K/UL — SIGNIFICANT CHANGE UP (ref 150–400)
POTASSIUM SERPL-MCNC: 4.7 MMOL/L — SIGNIFICANT CHANGE UP (ref 3.5–5.3)
POTASSIUM SERPL-SCNC: 4.7 MMOL/L — SIGNIFICANT CHANGE UP (ref 3.5–5.3)
PROT SERPL-MCNC: 8 GM/DL — SIGNIFICANT CHANGE UP (ref 6–8.3)
RBC # BLD: 3.19 M/UL — LOW (ref 3.8–5.2)
RBC # FLD: 14.1 % — SIGNIFICANT CHANGE UP (ref 10.3–14.5)
SODIUM SERPL-SCNC: 132 MMOL/L — LOW (ref 135–145)
TROPONIN I SERPL-MCNC: 13.1 NG/ML — HIGH (ref 0.01–0.04)
WBC # BLD: 5.95 K/UL — SIGNIFICANT CHANGE UP (ref 3.8–10.5)
WBC # FLD AUTO: 5.95 K/UL — SIGNIFICANT CHANGE UP (ref 3.8–10.5)

## 2018-03-30 PROCEDURE — 71045 X-RAY EXAM CHEST 1 VIEW: CPT | Mod: 26

## 2018-03-30 RX ORDER — LACTULOSE 10 G/15ML
15 SOLUTION ORAL ONCE
Qty: 0 | Refills: 0 | Status: COMPLETED | OUTPATIENT
Start: 2018-03-30 | End: 2018-03-31

## 2018-03-30 RX ADMIN — RANOLAZINE 500 MILLIGRAM(S): 500 TABLET, FILM COATED, EXTENDED RELEASE ORAL at 22:22

## 2018-03-30 RX ADMIN — BUDESONIDE AND FORMOTEROL FUMARATE DIHYDRATE 2 PUFF(S): 160; 4.5 AEROSOL RESPIRATORY (INHALATION) at 22:20

## 2018-03-30 RX ADMIN — Medication 1 INCH(S): at 05:58

## 2018-03-30 RX ADMIN — BUDESONIDE AND FORMOTEROL FUMARATE DIHYDRATE 2 PUFF(S): 160; 4.5 AEROSOL RESPIRATORY (INHALATION) at 05:59

## 2018-03-30 RX ADMIN — TICAGRELOR 90 MILLIGRAM(S): 90 TABLET ORAL at 22:20

## 2018-03-30 RX ADMIN — CEFTRIAXONE 100 GRAM(S): 500 INJECTION, POWDER, FOR SOLUTION INTRAMUSCULAR; INTRAVENOUS at 22:20

## 2018-03-30 RX ADMIN — Medication 1 INCH(S): at 16:04

## 2018-03-30 RX ADMIN — DORZOLAMIDE HYDROCHLORIDE 1 DROP(S): 20 SOLUTION/ DROPS OPHTHALMIC at 06:00

## 2018-03-30 RX ADMIN — RANOLAZINE 500 MILLIGRAM(S): 500 TABLET, FILM COATED, EXTENDED RELEASE ORAL at 05:55

## 2018-03-30 RX ADMIN — Medication 50 MILLIGRAM(S): at 05:57

## 2018-03-30 RX ADMIN — Medication 1 INCH(S): at 00:00

## 2018-03-30 RX ADMIN — Medication 81 MILLIGRAM(S): at 16:07

## 2018-03-30 RX ADMIN — Medication 100 MILLIGRAM(S): at 16:07

## 2018-03-30 RX ADMIN — HEPARIN SODIUM 5000 UNIT(S): 5000 INJECTION INTRAVENOUS; SUBCUTANEOUS at 05:55

## 2018-03-30 RX ADMIN — TICAGRELOR 90 MILLIGRAM(S): 90 TABLET ORAL at 05:55

## 2018-03-30 RX ADMIN — Medication 1 INCH(S): at 22:21

## 2018-03-30 NOTE — PROGRESS NOTE ADULT - SUBJECTIVE AND OBJECTIVE BOX
HAVING DIALYSIS CATHETER PLACED  TO HOLD BRILINTA 36 HOURS PRIOR TO PROCEDURE AND RESUME AS FEASIBLE FOR VASCULAR PROCEDURES

## 2018-03-30 NOTE — PROCEDURE NOTE - NSPROCDETAILS_GEN_ALL_CORE
lumen(s) aspirated and flushed/sterile dressing applied/sterile technique, catheter placed/guidewire recovered

## 2018-03-30 NOTE — PROGRESS NOTE ADULT - SUBJECTIVE AND OBJECTIVE BOX
Patient is a 85y old  Female who presents with a chief complaint of SOB (20 Mar 2018 15:43)      INTERVAL HPI/OVERNIGHT EVENTS: s/p Ramón cath placement  nausea<, > gas, Mckay removed, dysuria    MEDICATIONS  (STANDING):  allopurinol 100 milliGRAM(s) Oral daily  aspirin enteric coated 81 milliGRAM(s) Oral daily  buDESOnide 160 MICROgram(s)/formoterol 4.5 MICROgram(s) Inhaler 2 Puff(s) Inhalation two times a day  cefTRIAXone   IVPB      cefTRIAXone   IVPB 1 Gram(s) IV Intermittent every 24 hours  dextrose 5%. 1000 milliLiter(s) (50 mL/Hr) IV Continuous <Continuous>  dextrose 50% Injectable 12.5 Gram(s) IV Push once  dextrose 50% Injectable 25 Gram(s) IV Push once  dextrose 50% Injectable 25 Gram(s) IV Push once  dorzolamide 2% Ophthalmic Solution 1 Drop(s) Both EYES three times a day  heparin  Injectable 5000 Unit(s) SubCutaneous every 12 hours  hydrALAZINE 25 milliGRAM(s) Oral every 8 hours  insulin glargine Injectable (LANTUS) 14 Unit(s) SubCutaneous every morning  insulin lispro (HumaLOG) corrective regimen sliding scale   SubCutaneous three times a day before meals  lactulose Syrup 15 Gram(s) Oral once  metoprolol succinate ER 50 milliGRAM(s) Oral daily  nitroglycerin    2% Ointment 1 Inch(s) Transdermal every 6 hours  ranolazine 500 milliGRAM(s) Oral two times a day  senna 2 Tablet(s) Oral at bedtime  sodium chloride 0.9%. 1000 milliLiter(s) (60 mL/Hr) IV Continuous <Continuous>  ticagrelor 90 milliGRAM(s) Oral two times a day    MEDICATIONS  (PRN):  aluminum hydroxide/magnesium hydroxide/simethicone Suspension 30 milliLiter(s) Oral every 4 hours PRN Dyspepsia  dextrose Gel 1 Dose(s) Oral once PRN Blood Glucose LESS THAN 70 milliGRAM(s)/deciliter  glucagon  Injectable 1 milliGRAM(s) IntraMuscular once PRN Glucose LESS THAN 70 milligrams/deciliter  ondansetron Injectable 4 milliGRAM(s) IV Push every 6 hours PRN Nausea and/or Vomiting      Allergies    codeine (Other)    Intolerances        REVIEW OF SYSTEMS:        dysuria, gas  No CP, no SOB        Vital Signs Last 24 Hrs  T(C): 35.8 (30 Mar 2018 11:05), Max: 36.8 (30 Mar 2018 00:01)  T(F): 96.4 (30 Mar 2018 11:05), Max: 98.2 (30 Mar 2018 00:01)  HR: 60 (30 Mar 2018 11:39) (60 - 68)  BP: 116/57 (30 Mar 2018 11:39) (102/54 - 116/57)  BP(mean): --  RR: 18 (30 Mar 2018 11:39) (17 - 18)  SpO2: 98% (30 Mar 2018 11:39) (97% - 99%)    PHYSICAL EXAM:  general       HEENT wnl  R SVC catheter, no bleeding  CHEST/LUNG: Clear to percussion bilaterally; No rales, rhonchi, wheezing, or rubs  HEART: Regular rate and rhythm; No murmurs, rubs, or gallops  ABDOMEN: Soft, Nontender, Nondistended; Bowel sounds present  EXTREMITIES:  2+ Peripheral Pulses, No clubbing, cyanosis, or edema  LYMPH: No lymphadenopathy noted  SKIN: No rashes or lesions    LABS:                        9.0    5.95  )-----------( 278      ( 30 Mar 2018 07:51 )             28.2     03-30    132<L>  |  97  |  120<H>  ----------------------------<  140<H>  4.7   |  21<L>  |  5.94<H>    Ca    8.5      30 Mar 2018 07:51    TPro  8.0  /  Alb  2.7<L>  /  TBili  0.3  /  DBili  x   /  AST  325<H>  /  ALT  296<H>  /  AlkPhos  106  03-30        CAPILLARY BLOOD GLUCOSE      POCT Blood Glucose.: 145 mg/dL (30 Mar 2018 11:28)  POCT Blood Glucose.: 163 mg/dL (30 Mar 2018 08:07)  POCT Blood Glucose.: 203 mg/dL (29 Mar 2018 21:51)  POCT Blood Glucose.: 299 mg/dL (29 Mar 2018 16:22)      CARDIAC MARKERS ( 30 Mar 2018 07:51 )  13.100 ng/mL / x     / x     / x     / x      CARDIAC MARKERS ( 29 Mar 2018 06:15 )  16.400 ng/mL / x     / x     / x     / x      CARDIAC MARKERS ( 28 Mar 2018 16:05 )  19.600 ng/mL / x     / x     / x     / x                RADIOLOGY & ADDITIONAL TESTS:    Imaging Personally Reviewed:  [y ] YES  [ ] NO    Consultant(s) Notes Reviewed:  [y ] YES  [ ] NO    Care Discussed with Consultants/Other Providers [ ] YES  [ ] NO    PROBLEMS:  HEART FAILURE, ELEVATED TROPONIN LEVEL  SHORTNESS OF BREATH; UNABLE TO AMBULATE  Heart failure  Paraproteinemia  ARF  NSTEMI    Care discussed with family,         [  v]   yes  [  ]  No    imp:    stable[ ]    unstable[ v ]     improving [   ]       unchanged  [  ]                Plans:  HD today  to restart a/platelets and AC  Possible resume Mckay f/u diuresis

## 2018-03-30 NOTE — PROGRESS NOTE ADULT - SUBJECTIVE AND OBJECTIVE BOX
INTERVAL History:  Anemia.  Renal Failure.  M-Kahlil    Allergies    codeine (Other)    Intolerances        MEDICATIONS  (STANDING):  allopurinol 100 milliGRAM(s) Oral daily  aspirin enteric coated 81 milliGRAM(s) Oral daily  buDESOnide 160 MICROgram(s)/formoterol 4.5 MICROgram(s) Inhaler 2 Puff(s) Inhalation two times a day  cefTRIAXone   IVPB      cefTRIAXone   IVPB 1 Gram(s) IV Intermittent every 24 hours  dextrose 5%. 1000 milliLiter(s) (50 mL/Hr) IV Continuous <Continuous>  dextrose 50% Injectable 12.5 Gram(s) IV Push once  dextrose 50% Injectable 25 Gram(s) IV Push once  dextrose 50% Injectable 25 Gram(s) IV Push once  dorzolamide 2% Ophthalmic Solution 1 Drop(s) Both EYES three times a day  heparin  Injectable 5000 Unit(s) SubCutaneous every 12 hours  hydrALAZINE 25 milliGRAM(s) Oral every 8 hours  insulin glargine Injectable (LANTUS) 14 Unit(s) SubCutaneous every morning  insulin lispro (HumaLOG) corrective regimen sliding scale   SubCutaneous three times a day before meals  metoprolol succinate ER 50 milliGRAM(s) Oral daily  nitroglycerin    2% Ointment 1 Inch(s) Transdermal every 6 hours  ranolazine 500 milliGRAM(s) Oral two times a day  senna 2 Tablet(s) Oral at bedtime  sodium chloride 0.9%. 1000 milliLiter(s) (60 mL/Hr) IV Continuous <Continuous>  ticagrelor 90 milliGRAM(s) Oral two times a day    MEDICATIONS  (PRN):  aluminum hydroxide/magnesium hydroxide/simethicone Suspension 30 milliLiter(s) Oral every 4 hours PRN Dyspepsia  dextrose Gel 1 Dose(s) Oral once PRN Blood Glucose LESS THAN 70 milliGRAM(s)/deciliter  glucagon  Injectable 1 milliGRAM(s) IntraMuscular once PRN Glucose LESS THAN 70 milligrams/deciliter  ondansetron Injectable 4 milliGRAM(s) IV Push every 6 hours PRN Nausea and/or Vomiting      Vital Signs Last 24 Hrs  T(C): 36.3 (30 Mar 2018 05:26), Max: 36.8 (30 Mar 2018 00:01)  T(F): 97.4 (30 Mar 2018 05:26), Max: 98.2 (30 Mar 2018 00:01)  HR: 68 (30 Mar 2018 05:26) (65 - 68)  BP: 102/54 (30 Mar 2018 05:26) (102/54 - 114/63)  BP(mean): --  RR: 18 (30 Mar 2018 05:26) (17 - 18)  SpO2: 98% (30 Mar 2018 05:26) (98% - 99%)    PHYSICAL EXAM:      EYES: EOMI, PERRLA, conjunctiva and sclera clear  NECK: Supple, No JVD, Normal thyroid  CHEST/LUNG: Clear to percussion bilaterally; No rales, rhonchi,   HEART: Regular rate and rhythm;   ABDOMEN: Soft,         LABS:                        9.0    5.95  )-----------( 278      ( 30 Mar 2018 07:51 )             28.2     03-30    132<L>  |  97  |  120<H>  ----------------------------<  140<H>  4.7   |  21<L>  |  5.94<H>    Ca    8.5      30 Mar 2018 07:51    TPro  8.0  /  Alb  2.7<L>  /  TBili  0.3  /  DBili  x   /  AST  325<H>  /  ALT  296<H>  /  AlkPhos  106  03-30            RADIOLOGY & ADDITIONAL STUDIES:    PATHOLOGY:

## 2018-03-30 NOTE — PROGRESS NOTE ADULT - SUBJECTIVE AND OBJECTIVE BOX
JULIAN MEDELLIN  85y  Female    Patient is a 85y old  Female who presents with a chief complaint of SOB (20 Mar 2018 15:43)      complains of gas pains.  some shortness of breath.  agreed for RRT at this time  for a quintan placement and start dialysis. today.      PAST MEDICAL & SURGICAL HISTORY:  CAD S/P percutaneous coronary angioplasty  PVD (peripheral vascular disease)  HTN (hypertension)  DM (diabetes mellitus screen)  S/P foot surgery: left foot surgery with sage s/p fx.  S/P cataract surgery  S/P appendectomy  S/P hysterectomy  Amputation foot, unilat          PHYSICAL EXAM:    T(C): 35.8 (03-30-18 @ 11:05), Max: 36.8 (03-30-18 @ 00:01)  HR: 60 (03-30-18 @ 11:39) (60 - 68)  BP: 116/57 (03-30-18 @ 11:39) (102/54 - 116/57)  RR: 18 (03-30-18 @ 11:39) (17 - 18)  SpO2: 98% (03-30-18 @ 11:39) (97% - 99%)  Wt(kg): --    I&O's Detail      Respiratory: clear anteriorly, decreased BS at bases  Cardiovascular: S1 S2  Gastrointestinal: soft NT ND +BS  Extremities: edema   Neuro: Awake and alert    MEDICATIONS  (STANDING):  allopurinol 100 milliGRAM(s) Oral daily  aspirin enteric coated 81 milliGRAM(s) Oral daily  buDESOnide 160 MICROgram(s)/formoterol 4.5 MICROgram(s) Inhaler 2 Puff(s) Inhalation two times a day  cefTRIAXone   IVPB      cefTRIAXone   IVPB 1 Gram(s) IV Intermittent every 24 hours  dextrose 5%. 1000 milliLiter(s) (50 mL/Hr) IV Continuous <Continuous>  dextrose 50% Injectable 12.5 Gram(s) IV Push once  dextrose 50% Injectable 25 Gram(s) IV Push once  dextrose 50% Injectable 25 Gram(s) IV Push once  dorzolamide 2% Ophthalmic Solution 1 Drop(s) Both EYES three times a day  heparin  Injectable 5000 Unit(s) SubCutaneous every 12 hours  hydrALAZINE 25 milliGRAM(s) Oral every 8 hours  insulin glargine Injectable (LANTUS) 14 Unit(s) SubCutaneous every morning  insulin lispro (HumaLOG) corrective regimen sliding scale   SubCutaneous three times a day before meals  lactulose Syrup 15 Gram(s) Oral once  metoprolol succinate ER 50 milliGRAM(s) Oral daily  nitroglycerin    2% Ointment 1 Inch(s) Transdermal every 6 hours  ranolazine 500 milliGRAM(s) Oral two times a day  senna 2 Tablet(s) Oral at bedtime  sodium chloride 0.9%. 1000 milliLiter(s) (60 mL/Hr) IV Continuous <Continuous>  ticagrelor 90 milliGRAM(s) Oral two times a day    MEDICATIONS  (PRN):  aluminum hydroxide/magnesium hydroxide/simethicone Suspension 30 milliLiter(s) Oral every 4 hours PRN Dyspepsia  dextrose Gel 1 Dose(s) Oral once PRN Blood Glucose LESS THAN 70 milliGRAM(s)/deciliter  glucagon  Injectable 1 milliGRAM(s) IntraMuscular once PRN Glucose LESS THAN 70 milligrams/deciliter  ondansetron Injectable 4 milliGRAM(s) IV Push every 6 hours PRN Nausea and/or Vomiting                            9.0    5.95  )-----------( 278      ( 30 Mar 2018 07:51 )             28.2       03-30    132<L>  |  97  |  120<H>  ----------------------------<  140<H>  4.7   |  21<L>  |  5.94<H>    Ca    8.5      30 Mar 2018 07:51    TPro  8.0  /  Alb  2.7<L>  /  TBili  0.3  /  DBili  x   /  AST  325<H>  /  ALT  296<H>  /  AlkPhos  106  03-30

## 2018-03-30 NOTE — PROCEDURE NOTE - PROCEDURE
<<-----Click on this checkbox to enter Procedure Central line placement  03/30/2018    Active  MORAK

## 2018-03-31 LAB
GBM IGG SER-ACNC: <0.2 U — SIGNIFICANT CHANGE UP
GLUCOSE BLDC GLUCOMTR-MCNC: 127 MG/DL — HIGH (ref 70–99)
GLUCOSE BLDC GLUCOMTR-MCNC: 183 MG/DL — HIGH (ref 70–99)
GLUCOSE BLDC GLUCOMTR-MCNC: 188 MG/DL — HIGH (ref 70–99)
GLUCOSE BLDC GLUCOMTR-MCNC: 78 MG/DL — SIGNIFICANT CHANGE UP (ref 70–99)
HAV IGM SER-ACNC: SIGNIFICANT CHANGE UP
HAV IGM SER-ACNC: SIGNIFICANT CHANGE UP
HBV CORE IGM SER-ACNC: SIGNIFICANT CHANGE UP
HBV CORE IGM SER-ACNC: SIGNIFICANT CHANGE UP
HBV SURFACE AB SER-ACNC: SIGNIFICANT CHANGE UP
HBV SURFACE AB SER-ACNC: SIGNIFICANT CHANGE UP
HBV SURFACE AG SER-ACNC: SIGNIFICANT CHANGE UP
HBV SURFACE AG SER-ACNC: SIGNIFICANT CHANGE UP
HCT VFR BLD CALC: 26.7 % — LOW (ref 34.5–45)
HCV AB S/CO SERPL IA: 0.11 S/CO — SIGNIFICANT CHANGE UP
HCV AB S/CO SERPL IA: 0.12 S/CO — SIGNIFICANT CHANGE UP
HCV AB SERPL-IMP: SIGNIFICANT CHANGE UP
HCV AB SERPL-IMP: SIGNIFICANT CHANGE UP
HGB BLD-MCNC: 8.5 G/DL — LOW (ref 11.5–15.5)
MCHC RBC-ENTMCNC: 28.6 PG — SIGNIFICANT CHANGE UP (ref 27–34)
MCHC RBC-ENTMCNC: 31.8 GM/DL — LOW (ref 32–36)
MCV RBC AUTO: 89.9 FL — SIGNIFICANT CHANGE UP (ref 80–100)
NRBC # BLD: 12 /100 WBCS — HIGH (ref 0–0)
PLATELET # BLD AUTO: 221 K/UL — SIGNIFICANT CHANGE UP (ref 150–400)
RBC # BLD: 2.97 M/UL — LOW (ref 3.8–5.2)
RBC # FLD: 14.5 % — SIGNIFICANT CHANGE UP (ref 10.3–14.5)
WBC # BLD: 5.43 K/UL — SIGNIFICANT CHANGE UP (ref 3.8–10.5)
WBC # FLD AUTO: 5.43 K/UL — SIGNIFICANT CHANGE UP (ref 3.8–10.5)

## 2018-03-31 RX ORDER — LACTOBACILLUS ACIDOPHILUS 100MM CELL
1 CAPSULE ORAL
Qty: 0 | Refills: 0 | Status: DISCONTINUED | OUTPATIENT
Start: 2018-03-31 | End: 2018-04-03

## 2018-03-31 RX ADMIN — RANOLAZINE 500 MILLIGRAM(S): 500 TABLET, FILM COATED, EXTENDED RELEASE ORAL at 17:09

## 2018-03-31 RX ADMIN — RANOLAZINE 500 MILLIGRAM(S): 500 TABLET, FILM COATED, EXTENDED RELEASE ORAL at 09:30

## 2018-03-31 RX ADMIN — BUDESONIDE AND FORMOTEROL FUMARATE DIHYDRATE 2 PUFF(S): 160; 4.5 AEROSOL RESPIRATORY (INHALATION) at 17:09

## 2018-03-31 RX ADMIN — Medication 30 MILLILITER(S): at 09:35

## 2018-03-31 RX ADMIN — TICAGRELOR 90 MILLIGRAM(S): 90 TABLET ORAL at 17:09

## 2018-03-31 RX ADMIN — Medication 1 INCH(S): at 12:08

## 2018-03-31 RX ADMIN — Medication 1 TABLET(S): at 17:09

## 2018-03-31 RX ADMIN — LACTULOSE 15 GRAM(S): 10 SOLUTION ORAL at 00:30

## 2018-03-31 RX ADMIN — INSULIN GLARGINE 14 UNIT(S): 100 INJECTION, SOLUTION SUBCUTANEOUS at 09:27

## 2018-03-31 RX ADMIN — Medication 81 MILLIGRAM(S): at 12:08

## 2018-03-31 RX ADMIN — Medication 1 INCH(S): at 23:16

## 2018-03-31 RX ADMIN — Medication 100 MILLIGRAM(S): at 12:08

## 2018-03-31 RX ADMIN — Medication 1 INCH(S): at 09:31

## 2018-03-31 RX ADMIN — TICAGRELOR 90 MILLIGRAM(S): 90 TABLET ORAL at 09:30

## 2018-03-31 RX ADMIN — Medication 1 INCH(S): at 17:09

## 2018-03-31 NOTE — PROGRESS NOTE ADULT - SUBJECTIVE AND OBJECTIVE BOX
INTERVAL History:  M-Kahlil.    Allergies    codeine (Other)    Intolerances        MEDICATIONS  (STANDING):  allopurinol 100 milliGRAM(s) Oral daily  aspirin enteric coated 81 milliGRAM(s) Oral daily  buDESOnide 160 MICROgram(s)/formoterol 4.5 MICROgram(s) Inhaler 2 Puff(s) Inhalation two times a day  dextrose 5%. 1000 milliLiter(s) (50 mL/Hr) IV Continuous <Continuous>  dextrose 50% Injectable 12.5 Gram(s) IV Push once  dextrose 50% Injectable 25 Gram(s) IV Push once  dextrose 50% Injectable 25 Gram(s) IV Push once  dorzolamide 2% Ophthalmic Solution 1 Drop(s) Both EYES three times a day  heparin  Injectable 5000 Unit(s) SubCutaneous every 12 hours  hydrALAZINE 25 milliGRAM(s) Oral every 8 hours  insulin glargine Injectable (LANTUS) 14 Unit(s) SubCutaneous every morning  insulin lispro (HumaLOG) corrective regimen sliding scale   SubCutaneous three times a day before meals  lactobacillus acidophilus 1 Tablet(s) Oral three times a day with meals  metoprolol succinate ER 50 milliGRAM(s) Oral daily  nitroglycerin    2% Ointment 1 Inch(s) Transdermal every 6 hours  ranolazine 500 milliGRAM(s) Oral two times a day  senna 2 Tablet(s) Oral at bedtime  sodium chloride 0.9%. 1000 milliLiter(s) (60 mL/Hr) IV Continuous <Continuous>  ticagrelor 90 milliGRAM(s) Oral two times a day    MEDICATIONS  (PRN):  aluminum hydroxide/magnesium hydroxide/simethicone Suspension 30 milliLiter(s) Oral every 4 hours PRN Dyspepsia  dextrose Gel 1 Dose(s) Oral once PRN Blood Glucose LESS THAN 70 milliGRAM(s)/deciliter  glucagon  Injectable 1 milliGRAM(s) IntraMuscular once PRN Glucose LESS THAN 70 milligrams/deciliter  ondansetron Injectable 4 milliGRAM(s) IV Push every 6 hours PRN Nausea and/or Vomiting      Vital Signs Last 24 Hrs  T(C): 36.3 (31 Mar 2018 05:35), Max: 36.3 (31 Mar 2018 00:18)  T(F): 97.4 (31 Mar 2018 05:35), Max: 97.4 (31 Mar 2018 00:18)  HR: 58 (31 Mar 2018 05:35) (53 - 60)  BP: 117/62 (31 Mar 2018 05:35) (103/54 - 119/66)  BP(mean): --  RR: 19 (31 Mar 2018 05:35) (1 - 19)  SpO2: 95% (31 Mar 2018 05:35) (95% - 99%)    PHYSICAL EXAM:      EYES: EOMI, PERRLA, conjunctiva and sclera clear  NECK: Supple, No JVD, Normal thyroid  CHEST/LUNG:  rales, rhonchi,     ABDOMEN: Soft,    LABS:                        9.0    5.95  )-----------( 278      ( 30 Mar 2018 07:51 )             28.2     03-30    132<L>  |  97  |  120<H>  ----------------------------<  140<H>  4.7   |  21<L>  |  5.94<H>    Ca    8.5      30 Mar 2018 07:51    TPro  8.0  /  Alb  2.7<L>  /  TBili  0.3  /  DBili  x   /  AST  325<H>  /  ALT  296<H>  /  AlkPhos  106  03-30            RADIOLOGY & ADDITIONAL STUDIES:    PATHOLOGY:

## 2018-03-31 NOTE — PROGRESS NOTE ADULT - SUBJECTIVE AND OBJECTIVE BOX
HD started  c/o LLQ pain  Diarrhea  Unable to urinate  Lungs clear  S1S2 distant  Abd soft LLQ tenderness  Bladder not palpated  Restarted ASA and Brilinta  Check bladder scan

## 2018-03-31 NOTE — PROGRESS NOTE ADULT - SUBJECTIVE AND OBJECTIVE BOX
JULIAN MEDELLIN  85y  Female    Patient is a 85y old  Female who presents with a chief complaint of SOB (20 Mar 2018 15:43)      HPI:  PT C hX dm cad pci recently, for 2 days > SOB on exertion, No CP, No fever, chills, N/V/C/D (20 Mar 2018 09:19)    seen on dialysis. right IJ catheter works well.       PAST MEDICAL & SURGICAL HISTORY:  CAD S/P percutaneous coronary angioplasty  PVD (peripheral vascular disease)  HTN (hypertension)  DM (diabetes mellitus screen)  S/P foot surgery: left foot surgery with sage s/p fx.  S/P cataract surgery  S/P appendectomy  S/P hysterectomy  Amputation foot, unilat          PHYSICAL EXAM:    T(C): 35.8 (03-31-18 @ 12:45), Max: 36.3 (03-31-18 @ 00:18)  HR: 58 (03-31-18 @ 12:45) (53 - 58)  BP: 116/70 (03-31-18 @ 12:45) (103/54 - 120/57)  RR: 18 (03-31-18 @ 12:45) (1 - 19)  SpO2: 97% (03-31-18 @ 12:45) (95% - 99%)  Wt(kg): --    I&O's Detail    30 Mar 2018 07:01  -  31 Mar 2018 07:00  --------------------------------------------------------  IN:    Oral Fluid: 240 mL    Other: 700 mL  Total IN: 940 mL    OUT:    Other: 1700 mL  Total OUT: 1700 mL    Total NET: -760 mL      31 Mar 2018 07:01  -  31 Mar 2018 15:16  --------------------------------------------------------  IN:    Oral Fluid: 240 mL  Total IN: 240 mL    OUT:    Voided: 1 mL  Total OUT: 1 mL    Total NET: 239 mL          Respiratory: clear anteriorly, decreased BS at bases  Cardiovascular: S1 S2  Gastrointestinal: soft NT ND +BS  Extremities: edema   Neuro: Awake and alert    MEDICATIONS  (STANDING):  allopurinol 100 milliGRAM(s) Oral daily  aspirin enteric coated 81 milliGRAM(s) Oral daily  buDESOnide 160 MICROgram(s)/formoterol 4.5 MICROgram(s) Inhaler 2 Puff(s) Inhalation two times a day  dextrose 5%. 1000 milliLiter(s) (50 mL/Hr) IV Continuous <Continuous>  dextrose 50% Injectable 12.5 Gram(s) IV Push once  dextrose 50% Injectable 25 Gram(s) IV Push once  dextrose 50% Injectable 25 Gram(s) IV Push once  dorzolamide 2% Ophthalmic Solution 1 Drop(s) Both EYES three times a day  heparin  Injectable 5000 Unit(s) SubCutaneous every 12 hours  hydrALAZINE 25 milliGRAM(s) Oral every 8 hours  insulin glargine Injectable (LANTUS) 14 Unit(s) SubCutaneous every morning  insulin lispro (HumaLOG) corrective regimen sliding scale   SubCutaneous three times a day before meals  lactobacillus acidophilus 1 Tablet(s) Oral three times a day with meals  metoprolol succinate ER 50 milliGRAM(s) Oral daily  nitroglycerin    2% Ointment 1 Inch(s) Transdermal every 6 hours  ranolazine 500 milliGRAM(s) Oral two times a day  senna 2 Tablet(s) Oral at bedtime  sodium chloride 0.9%. 1000 milliLiter(s) (60 mL/Hr) IV Continuous <Continuous>  ticagrelor 90 milliGRAM(s) Oral two times a day    MEDICATIONS  (PRN):  aluminum hydroxide/magnesium hydroxide/simethicone Suspension 30 milliLiter(s) Oral every 4 hours PRN Dyspepsia  dextrose Gel 1 Dose(s) Oral once PRN Blood Glucose LESS THAN 70 milliGRAM(s)/deciliter  glucagon  Injectable 1 milliGRAM(s) IntraMuscular once PRN Glucose LESS THAN 70 milligrams/deciliter  ondansetron Injectable 4 milliGRAM(s) IV Push every 6 hours PRN Nausea and/or Vomiting                            8.5    5.43  )-----------( 221      ( 31 Mar 2018 14:32 )             26.7       03-30    132<L>  |  97  |  120<H>  ----------------------------<  140<H>  4.7   |  21<L>  |  5.94<H>    Ca    8.5      30 Mar 2018 07:51    TPro  8.0  /  Alb  2.7<L>  /  TBili  0.3  /  DBili  x   /  AST  325<H>  /  ALT  296<H>  /  AlkPhos  106  03-30

## 2018-04-01 LAB
ALBUMIN SERPL ELPH-MCNC: 2.8 G/DL — LOW (ref 3.3–5)
ALP SERPL-CCNC: 177 U/L — HIGH (ref 40–120)
ALT FLD-CCNC: 738 U/L — HIGH (ref 12–78)
ANION GAP SERPL CALC-SCNC: 12 MMOL/L — SIGNIFICANT CHANGE UP (ref 5–17)
AST SERPL-CCNC: 633 U/L — HIGH (ref 15–37)
BILIRUB SERPL-MCNC: 0.7 MG/DL — SIGNIFICANT CHANGE UP (ref 0.2–1.2)
BUN SERPL-MCNC: 51 MG/DL — HIGH (ref 7–23)
C-ANCA SER-ACNC: NEGATIVE — SIGNIFICANT CHANGE UP
CALCIUM SERPL-MCNC: 8.3 MG/DL — LOW (ref 8.5–10.1)
CHLORIDE SERPL-SCNC: 100 MMOL/L — SIGNIFICANT CHANGE UP (ref 96–108)
CO2 SERPL-SCNC: 22 MMOL/L — SIGNIFICANT CHANGE UP (ref 22–31)
CREAT SERPL-MCNC: 4.29 MG/DL — HIGH (ref 0.5–1.3)
GLUCOSE BLDC GLUCOMTR-MCNC: 173 MG/DL — HIGH (ref 70–99)
GLUCOSE BLDC GLUCOMTR-MCNC: 217 MG/DL — HIGH (ref 70–99)
GLUCOSE BLDC GLUCOMTR-MCNC: 221 MG/DL — HIGH (ref 70–99)
GLUCOSE BLDC GLUCOMTR-MCNC: 89 MG/DL — SIGNIFICANT CHANGE UP (ref 70–99)
GLUCOSE SERPL-MCNC: 158 MG/DL — HIGH (ref 70–99)
HCT VFR BLD CALC: 26.6 % — LOW (ref 34.5–45)
HCT VFR BLD CALC: 26.8 % — LOW (ref 34.5–45)
HGB BLD-MCNC: 8.5 G/DL — LOW (ref 11.5–15.5)
HGB BLD-MCNC: 8.8 G/DL — LOW (ref 11.5–15.5)
MCHC RBC-ENTMCNC: 28.4 PG — SIGNIFICANT CHANGE UP (ref 27–34)
MCHC RBC-ENTMCNC: 29.2 PG — SIGNIFICANT CHANGE UP (ref 27–34)
MCHC RBC-ENTMCNC: 32 GM/DL — SIGNIFICANT CHANGE UP (ref 32–36)
MCHC RBC-ENTMCNC: 32.8 GM/DL — SIGNIFICANT CHANGE UP (ref 32–36)
MCV RBC AUTO: 89 FL — SIGNIFICANT CHANGE UP (ref 80–100)
MCV RBC AUTO: 89 FL — SIGNIFICANT CHANGE UP (ref 80–100)
NRBC # BLD: 22 /100 WBCS — HIGH (ref 0–0)
NRBC # BLD: 25 /100 WBCS — HIGH (ref 0–0)
P-ANCA SER-ACNC: NEGATIVE — SIGNIFICANT CHANGE UP
PLATELET # BLD AUTO: 175 K/UL — SIGNIFICANT CHANGE UP (ref 150–400)
PLATELET # BLD AUTO: 198 K/UL — SIGNIFICANT CHANGE UP (ref 150–400)
POTASSIUM SERPL-MCNC: 4.2 MMOL/L — SIGNIFICANT CHANGE UP (ref 3.5–5.3)
POTASSIUM SERPL-SCNC: 4.2 MMOL/L — SIGNIFICANT CHANGE UP (ref 3.5–5.3)
PROT SERPL-MCNC: 7.9 GM/DL — SIGNIFICANT CHANGE UP (ref 6–8.3)
RBC # BLD: 2.99 M/UL — LOW (ref 3.8–5.2)
RBC # BLD: 3.01 M/UL — LOW (ref 3.8–5.2)
RBC # FLD: 14.3 % — SIGNIFICANT CHANGE UP (ref 10.3–14.5)
RBC # FLD: 14.5 % — SIGNIFICANT CHANGE UP (ref 10.3–14.5)
SODIUM SERPL-SCNC: 134 MMOL/L — LOW (ref 135–145)
WBC # BLD: 6.23 K/UL — SIGNIFICANT CHANGE UP (ref 3.8–10.5)
WBC # BLD: 7.21 K/UL — SIGNIFICANT CHANGE UP (ref 3.8–10.5)
WBC # FLD AUTO: 6.23 K/UL — SIGNIFICANT CHANGE UP (ref 3.8–10.5)
WBC # FLD AUTO: 7.21 K/UL — SIGNIFICANT CHANGE UP (ref 3.8–10.5)

## 2018-04-01 RX ORDER — SODIUM CHLORIDE 9 MG/ML
1000 INJECTION, SOLUTION INTRAVENOUS
Qty: 0 | Refills: 0 | Status: DISCONTINUED | OUTPATIENT
Start: 2018-04-01 | End: 2018-04-03

## 2018-04-01 RX ORDER — METOCLOPRAMIDE HCL 10 MG
10 TABLET ORAL ONCE
Qty: 0 | Refills: 0 | Status: COMPLETED | OUTPATIENT
Start: 2018-04-01 | End: 2018-04-01

## 2018-04-01 RX ORDER — FUROSEMIDE 40 MG
20 TABLET ORAL ONCE
Qty: 0 | Refills: 0 | Status: COMPLETED | OUTPATIENT
Start: 2018-04-01 | End: 2018-04-01

## 2018-04-01 RX ADMIN — BUDESONIDE AND FORMOTEROL FUMARATE DIHYDRATE 2 PUFF(S): 160; 4.5 AEROSOL RESPIRATORY (INHALATION) at 17:03

## 2018-04-01 RX ADMIN — RANOLAZINE 500 MILLIGRAM(S): 500 TABLET, FILM COATED, EXTENDED RELEASE ORAL at 17:02

## 2018-04-01 RX ADMIN — Medication 1 INCH(S): at 05:40

## 2018-04-01 RX ADMIN — TICAGRELOR 90 MILLIGRAM(S): 90 TABLET ORAL at 17:02

## 2018-04-01 RX ADMIN — Medication 1 INCH(S): at 17:01

## 2018-04-01 RX ADMIN — Medication 10 MILLIGRAM(S): at 12:56

## 2018-04-01 RX ADMIN — Medication 20 MILLIGRAM(S): at 12:57

## 2018-04-01 RX ADMIN — SODIUM CHLORIDE 60 MILLILITER(S): 9 INJECTION, SOLUTION INTRAVENOUS at 12:06

## 2018-04-01 RX ADMIN — ONDANSETRON 4 MILLIGRAM(S): 8 TABLET, FILM COATED ORAL at 08:27

## 2018-04-01 RX ADMIN — Medication 4: at 17:02

## 2018-04-01 RX ADMIN — Medication 81 MILLIGRAM(S): at 12:00

## 2018-04-01 NOTE — PROGRESS NOTE ADULT - SUBJECTIVE AND OBJECTIVE BOX
JULIAN MEDELLIN  85y  Female    Patient is a 85y old  Female who presents with a chief complaint of SOB (20 Mar 2018 15:43)      patient sitting at the edge of the bed and is complaining of nausea.  denies any chest pain or shortness of breath.  started on dialysis and had two treatments so far.       PAST MEDICAL & SURGICAL HISTORY:  CAD S/P percutaneous coronary angioplasty  PVD (peripheral vascular disease)  HTN (hypertension)  DM (diabetes mellitus screen)  S/P foot surgery: left foot surgery with sage s/p fx.  S/P cataract surgery  S/P appendectomy  S/P hysterectomy  Amputation foot, unilat          PHYSICAL EXAM:    T(C): 35.9 (04-01-18 @ 05:25), Max: 37 (04-01-18 @ 00:28)  HR: 64 (04-01-18 @ 10:50) (57 - 64)  BP: 114/60 (04-01-18 @ 10:50) (93/48 - 125/83)  RR: 17 (04-01-18 @ 05:25) (17 - 20)  SpO2: 98% (04-01-18 @ 05:25) (95% - 98%)  Wt(kg): --    I&O's Detail    31 Mar 2018 07:01  -  01 Apr 2018 07:00  --------------------------------------------------------  IN:    Oral Fluid: 240 mL  Total IN: 240 mL    OUT:    Other: 1500 mL    Voided: 1 mL  Total OUT: 1501 mL    Total NET: -1261 mL          Respiratory: clear anteriorly, decreased BS at bases  Cardiovascular: S1 S2  Gastrointestinal: soft NT ND +BS  Extremities: edema   Neuro: Awake and alert    MEDICATIONS  (STANDING):  aspirin enteric coated 81 milliGRAM(s) Oral daily  buDESOnide 160 MICROgram(s)/formoterol 4.5 MICROgram(s) Inhaler 2 Puff(s) Inhalation two times a day  dextrose 5% + sodium chloride 0.9%. 1000 milliLiter(s) (60 mL/Hr) IV Continuous <Continuous>  dextrose 5%. 1000 milliLiter(s) (50 mL/Hr) IV Continuous <Continuous>  dextrose 50% Injectable 12.5 Gram(s) IV Push once  dextrose 50% Injectable 25 Gram(s) IV Push once  dextrose 50% Injectable 25 Gram(s) IV Push once  dorzolamide 2% Ophthalmic Solution 1 Drop(s) Both EYES three times a day  heparin  Injectable 5000 Unit(s) SubCutaneous every 12 hours  insulin glargine Injectable (LANTUS) 14 Unit(s) SubCutaneous every morning  insulin lispro (HumaLOG) corrective regimen sliding scale   SubCutaneous three times a day before meals  lactobacillus acidophilus 1 Tablet(s) Oral three times a day with meals  nitroglycerin    2% Ointment 1 Inch(s) Transdermal every 6 hours  ranolazine 500 milliGRAM(s) Oral two times a day  senna 2 Tablet(s) Oral at bedtime  ticagrelor 90 milliGRAM(s) Oral two times a day    MEDICATIONS  (PRN):  aluminum hydroxide/magnesium hydroxide/simethicone Suspension 30 milliLiter(s) Oral every 4 hours PRN Dyspepsia  dextrose Gel 1 Dose(s) Oral once PRN Blood Glucose LESS THAN 70 milliGRAM(s)/deciliter  glucagon  Injectable 1 milliGRAM(s) IntraMuscular once PRN Glucose LESS THAN 70 milligrams/deciliter  ondansetron Injectable 4 milliGRAM(s) IV Push every 6 hours PRN Nausea and/or Vomiting                            8.8    6.23  )-----------( 198      ( 01 Apr 2018 06:29 )             26.8

## 2018-04-01 NOTE — PROGRESS NOTE ADULT - SUBJECTIVE AND OBJECTIVE BOX
Patient is a 85y old  Female who presents with a chief complaint of SOB (20 Mar 2018 15:43)      INTERVAL HPI/OVERNIGHT EVENTS: c/o nausea, weakness, no appetite; no abd pain anymore    MEDICATIONS  (STANDING):  aspirin enteric coated 81 milliGRAM(s) Oral daily  buDESOnide 160 MICROgram(s)/formoterol 4.5 MICROgram(s) Inhaler 2 Puff(s) Inhalation two times a day  dextrose 5% + sodium chloride 0.9%. 1000 milliLiter(s) (60 mL/Hr) IV Continuous <Continuous>  dextrose 5%. 1000 milliLiter(s) (50 mL/Hr) IV Continuous <Continuous>  dextrose 50% Injectable 12.5 Gram(s) IV Push once  dextrose 50% Injectable 25 Gram(s) IV Push once  dextrose 50% Injectable 25 Gram(s) IV Push once  dorzolamide 2% Ophthalmic Solution 1 Drop(s) Both EYES three times a day  furosemide   Injectable 20 milliGRAM(s) IV Push once  heparin  Injectable 5000 Unit(s) SubCutaneous every 12 hours  insulin glargine Injectable (LANTUS) 14 Unit(s) SubCutaneous every morning  insulin lispro (HumaLOG) corrective regimen sliding scale   SubCutaneous three times a day before meals  lactobacillus acidophilus 1 Tablet(s) Oral three times a day with meals  metoclopramide Injectable 10 milliGRAM(s) IV Push once  nitroglycerin    2% Ointment 1 Inch(s) Transdermal every 6 hours  ranolazine 500 milliGRAM(s) Oral two times a day  senna 2 Tablet(s) Oral at bedtime  ticagrelor 90 milliGRAM(s) Oral two times a day    MEDICATIONS  (PRN):  aluminum hydroxide/magnesium hydroxide/simethicone Suspension 30 milliLiter(s) Oral every 4 hours PRN Dyspepsia  dextrose Gel 1 Dose(s) Oral once PRN Blood Glucose LESS THAN 70 milliGRAM(s)/deciliter  glucagon  Injectable 1 milliGRAM(s) IntraMuscular once PRN Glucose LESS THAN 70 milligrams/deciliter  ondansetron Injectable 4 milliGRAM(s) IV Push every 6 hours PRN Nausea and/or Vomiting      Allergies    codeine (Other)    Intolerances        REVIEW OF SYSTEMS:      no CP  no SOB      Vital Signs Last 24 Hrs  T(C): 36.2 (01 Apr 2018 10:50), Max: 37 (01 Apr 2018 00:28)  T(F): 97.2 (01 Apr 2018 10:50), Max: 98.6 (01 Apr 2018 00:28)  HR: 64 (01 Apr 2018 10:50) (57 - 64)  BP: 114/60 (01 Apr 2018 10:50) (93/48 - 125/83)  BP(mean): --  RR: 17 (01 Apr 2018 10:50) (17 - 20)  SpO2: 98% (01 Apr 2018 10:50) (95% - 98%)    PHYSICAL EXAM:  general       HEENT wnl  CHEST/LUNG: Clear to percussion bilaterally; No rales, rhonchi, wheezing, or rubs  HEART: soft H sounds  ABDOMEN: Soft, Nontender, Nondistended; Bowel sounds present  EXTREMITIES:  2+ Peripheral Pulses, No clubbing, cyanosis, or edema  LYMPH: No lymphadenopathy noted  SKIN: No rashes or lesions    LABS:                        8.8    6.23  )-----------( 198      ( 01 Apr 2018 06:29 )             26.8               CAPILLARY BLOOD GLUCOSE      POCT Blood Glucose.: 173 mg/dL (01 Apr 2018 12:05)  POCT Blood Glucose.: 89 mg/dL (01 Apr 2018 08:26)  POCT Blood Glucose.: 183 mg/dL (31 Mar 2018 22:45)  POCT Blood Glucose.: 78 mg/dL (31 Mar 2018 16:57)                RADIOLOGY & ADDITIONAL TESTS:    Imaging Personally Reviewed:  [y ] YES  [ ] NO    Consultant(s) Notes Reviewed:  [y ] YES  [ ] NO    Care Discussed with Consultants/Other Providers [ ] YES  [ ] NO    PROBLEMS:  HEART FAILURE, ELEVATED TROPONIN LEVEL  SHORTNESS OF BREATH; UNABLE TO AMBULATE  Heart failure  Paraproteinemia  NSTEMI  Acute on chronic sytolic CHF  ARF    Care discussed with family,         [ v ]   yes  [  ]  No    imp:    stable[ ]    unstable[  v]     improving [   ]       unchanged  [  ]                Plans:  started gentle hydration prognosis guarded

## 2018-04-01 NOTE — PROGRESS NOTE ADULT - SUBJECTIVE AND OBJECTIVE BOX
INTERVAL History:  Weak  Dyspnea.  Back Pain.    Allergies    codeine (Other)    Intolerances        MEDICATIONS  (STANDING):  allopurinol 100 milliGRAM(s) Oral daily  aspirin enteric coated 81 milliGRAM(s) Oral daily  buDESOnide 160 MICROgram(s)/formoterol 4.5 MICROgram(s) Inhaler 2 Puff(s) Inhalation two times a day  dextrose 5%. 1000 milliLiter(s) (50 mL/Hr) IV Continuous <Continuous>  dextrose 50% Injectable 12.5 Gram(s) IV Push once  dextrose 50% Injectable 25 Gram(s) IV Push once  dextrose 50% Injectable 25 Gram(s) IV Push once  dorzolamide 2% Ophthalmic Solution 1 Drop(s) Both EYES three times a day  heparin  Injectable 5000 Unit(s) SubCutaneous every 12 hours  hydrALAZINE 25 milliGRAM(s) Oral every 8 hours  insulin glargine Injectable (LANTUS) 14 Unit(s) SubCutaneous every morning  insulin lispro (HumaLOG) corrective regimen sliding scale   SubCutaneous three times a day before meals  lactobacillus acidophilus 1 Tablet(s) Oral three times a day with meals  metoprolol succinate ER 50 milliGRAM(s) Oral daily  nitroglycerin    2% Ointment 1 Inch(s) Transdermal every 6 hours  ranolazine 500 milliGRAM(s) Oral two times a day  senna 2 Tablet(s) Oral at bedtime  sodium chloride 0.9%. 1000 milliLiter(s) (60 mL/Hr) IV Continuous <Continuous>  ticagrelor 90 milliGRAM(s) Oral two times a day    MEDICATIONS  (PRN):  aluminum hydroxide/magnesium hydroxide/simethicone Suspension 30 milliLiter(s) Oral every 4 hours PRN Dyspepsia  dextrose Gel 1 Dose(s) Oral once PRN Blood Glucose LESS THAN 70 milliGRAM(s)/deciliter  glucagon  Injectable 1 milliGRAM(s) IntraMuscular once PRN Glucose LESS THAN 70 milligrams/deciliter  ondansetron Injectable 4 milliGRAM(s) IV Push every 6 hours PRN Nausea and/or Vomiting      Vital Signs Last 24 Hrs  T(C): 35.9 (01 Apr 2018 05:25), Max: 37 (01 Apr 2018 00:28)  T(F): 96.7 (01 Apr 2018 05:25), Max: 98.6 (01 Apr 2018 00:28)  HR: 60 (01 Apr 2018 05:25) (57 - 60)  BP: 103/54 (01 Apr 2018 05:25) (93/48 - 125/83)  BP(mean): --  RR: 17 (01 Apr 2018 05:25) (17 - 20)  SpO2: 98% (01 Apr 2018 05:25) (95% - 98%)    PHYSICAL EXAM:      EYES: EOMI, PERRLA, conjunctiva and sclera clear  NECK: Supple, No JVD, Normal thyroid  CHEST/LUNG:  rales, rhonchi,   HEART: Regular rate and rhythm;   ABDOMEN: Soft, Nontender.  LYMPH: No lymphadenopathy noted        LABS:                        8.8    6.23  )-----------( 198      ( 01 Apr 2018 06:29 )             26.8                   RADIOLOGY & ADDITIONAL STUDIES:    PATHOLOGY:

## 2018-04-02 ENCOUNTER — TRANSCRIPTION ENCOUNTER (OUTPATIENT)
Age: 83
End: 2018-04-02

## 2018-04-02 DIAGNOSIS — I50.9 HEART FAILURE, UNSPECIFIED: ICD-10-CM

## 2018-04-02 LAB
ALBUMIN SERPL ELPH-MCNC: 2.5 G/DL — LOW (ref 3.3–5)
ALP SERPL-CCNC: 153 U/L — HIGH (ref 40–120)
ALT FLD-CCNC: 857 U/L — HIGH (ref 12–78)
ANION GAP SERPL CALC-SCNC: 12 MMOL/L — SIGNIFICANT CHANGE UP (ref 5–17)
AST SERPL-CCNC: 686 U/L — HIGH (ref 15–37)
BILIRUB SERPL-MCNC: 1.1 MG/DL — SIGNIFICANT CHANGE UP (ref 0.2–1.2)
BUN SERPL-MCNC: 60 MG/DL — HIGH (ref 7–23)
CALCIUM SERPL-MCNC: 8.1 MG/DL — LOW (ref 8.5–10.1)
CHLORIDE SERPL-SCNC: 98 MMOL/L — SIGNIFICANT CHANGE UP (ref 96–108)
CO2 SERPL-SCNC: 21 MMOL/L — LOW (ref 22–31)
CREAT SERPL-MCNC: 5.26 MG/DL — HIGH (ref 0.5–1.3)
GLUCOSE BLDC GLUCOMTR-MCNC: 172 MG/DL — HIGH (ref 70–99)
GLUCOSE BLDC GLUCOMTR-MCNC: 185 MG/DL — HIGH (ref 70–99)
GLUCOSE BLDC GLUCOMTR-MCNC: 290 MG/DL — HIGH (ref 70–99)
GLUCOSE SERPL-MCNC: 314 MG/DL — HIGH (ref 70–99)
HCT VFR BLD CALC: 27.1 % — LOW (ref 34.5–45)
HGB BLD-MCNC: 8.5 G/DL — LOW (ref 11.5–15.5)
MCHC RBC-ENTMCNC: 28.6 PG — SIGNIFICANT CHANGE UP (ref 27–34)
MCHC RBC-ENTMCNC: 31.4 GM/DL — LOW (ref 32–36)
MCV RBC AUTO: 91.2 FL — SIGNIFICANT CHANGE UP (ref 80–100)
NRBC # BLD: 24 /100 WBCS — HIGH (ref 0–0)
PLATELET # BLD AUTO: 154 K/UL — SIGNIFICANT CHANGE UP (ref 150–400)
POTASSIUM SERPL-MCNC: 4.4 MMOL/L — SIGNIFICANT CHANGE UP (ref 3.5–5.3)
POTASSIUM SERPL-SCNC: 4.4 MMOL/L — SIGNIFICANT CHANGE UP (ref 3.5–5.3)
PROT SERPL-MCNC: 7.5 GM/DL — SIGNIFICANT CHANGE UP (ref 6–8.3)
RBC # BLD: 2.97 M/UL — LOW (ref 3.8–5.2)
RBC # FLD: 14.7 % — HIGH (ref 10.3–14.5)
SODIUM SERPL-SCNC: 131 MMOL/L — LOW (ref 135–145)
VISC SER: 1.8 — SIGNIFICANT CHANGE UP (ref 1.5–1.9)
WBC # BLD: 8.43 K/UL — SIGNIFICANT CHANGE UP (ref 3.8–10.5)
WBC # FLD AUTO: 8.43 K/UL — SIGNIFICANT CHANGE UP (ref 3.8–10.5)

## 2018-04-02 RX ORDER — ERYTHROPOIETIN 10000 [IU]/ML
10000 INJECTION, SOLUTION INTRAVENOUS; SUBCUTANEOUS ONCE
Qty: 0 | Refills: 0 | Status: COMPLETED | OUTPATIENT
Start: 2018-04-02 | End: 2018-04-02

## 2018-04-02 RX ADMIN — ERYTHROPOIETIN 10000 UNIT(S): 10000 INJECTION, SOLUTION INTRAVENOUS; SUBCUTANEOUS at 12:52

## 2018-04-02 RX ADMIN — Medication 6: at 08:13

## 2018-04-02 RX ADMIN — RANOLAZINE 500 MILLIGRAM(S): 500 TABLET, FILM COATED, EXTENDED RELEASE ORAL at 17:13

## 2018-04-02 RX ADMIN — SODIUM CHLORIDE 60 MILLILITER(S): 9 INJECTION, SOLUTION INTRAVENOUS at 06:52

## 2018-04-02 RX ADMIN — BUDESONIDE AND FORMOTEROL FUMARATE DIHYDRATE 2 PUFF(S): 160; 4.5 AEROSOL RESPIRATORY (INHALATION) at 07:07

## 2018-04-02 RX ADMIN — Medication 1 TABLET(S): at 08:14

## 2018-04-02 RX ADMIN — BUDESONIDE AND FORMOTEROL FUMARATE DIHYDRATE 2 PUFF(S): 160; 4.5 AEROSOL RESPIRATORY (INHALATION) at 17:12

## 2018-04-02 RX ADMIN — RANOLAZINE 500 MILLIGRAM(S): 500 TABLET, FILM COATED, EXTENDED RELEASE ORAL at 08:14

## 2018-04-02 RX ADMIN — TICAGRELOR 90 MILLIGRAM(S): 90 TABLET ORAL at 08:14

## 2018-04-02 RX ADMIN — Medication 1 TABLET(S): at 17:13

## 2018-04-02 RX ADMIN — SODIUM CHLORIDE 60 MILLILITER(S): 9 INJECTION, SOLUTION INTRAVENOUS at 21:45

## 2018-04-02 NOTE — PROGRESS NOTE ADULT - SUBJECTIVE AND OBJECTIVE BOX
Patient seen in follow up for CKD, new ESRD. Receiving HD now, 3rd session, tolerating treatment well, removing approximately 1500 ml with stable BP. Had "good sleep", feels "better today." No SOB, no pain. Right I.J. Ramón catheter is functional.    PMH:  CKD/ESRD  CAD S/P percutaneous coronary angioplasty  PVD (peripheral vascular disease)  HTN (hypertension)  DM (diabetes mellitus screen)    MEDICATIONS  (STANDING):  aspirin enteric coated 81 milliGRAM(s) Oral daily  buDESOnide 160 MICROgram(s)/formoterol 4.5 MICROgram(s) Inhaler 2 Puff(s) Inhalation two times a day  dextrose 5% + sodium chloride 0.9%. 1000 milliLiter(s) (60 mL/Hr) IV Continuous <Continuous>  dextrose 5%. 1000 milliLiter(s) (50 mL/Hr) IV Continuous <Continuous>  dextrose 50% Injectable 12.5 Gram(s) IV Push once  dextrose 50% Injectable 25 Gram(s) IV Push once  dextrose 50% Injectable 25 Gram(s) IV Push once  dorzolamide 2% Ophthalmic Solution 1 Drop(s) Both EYES three times a day  heparin  Injectable 5000 Unit(s) SubCutaneous every 12 hours  insulin glargine Injectable (LANTUS) 14 Unit(s) SubCutaneous every morning  insulin lispro (HumaLOG) corrective regimen sliding scale   SubCutaneous three times a day before meals  lactobacillus acidophilus 1 Tablet(s) Oral three times a day with meals  nitroglycerin    2% Ointment 1 Inch(s) Transdermal every 6 hours  ranolazine 500 milliGRAM(s) Oral two times a day  senna 2 Tablet(s) Oral at bedtime    MEDICATIONS  (PRN):  aluminum hydroxide/magnesium hydroxide/simethicone Suspension 30 milliLiter(s) Oral every 4 hours PRN Dyspepsia  dextrose Gel 1 Dose(s) Oral once PRN Blood Glucose LESS THAN 70 milliGRAM(s)/deciliter  glucagon  Injectable 1 milliGRAM(s) IntraMuscular once PRN Glucose LESS THAN 70 milligrams/deciliter  ondansetron Injectable 4 milliGRAM(s) IV Push every 6 hours PRN Nausea and/or Vomiting    T(C): 35.9 (04-02-18 @ 10:55), Max: 37.1 (04-02-18 @ 06:07)  HR: 60 (04-02-18 @ 10:55) (57 - 64)  BP: 112/59 (04-02-18 @ 10:55) (96/53 - 115/54)  RR: 18 (04-02-18 @ 10:55) (17 - 20)  SpO2: 99% (04-02-18 @ 10:55) (95% - 100%)  Wt(kg): --  I&O's Detail    01 Apr 2018 07:01  -  02 Apr 2018 07:00  --------------------------------------------------------  IN:    dextrose 5% + sodium chloride 0.9%.: 720 mL  Total IN: 720 mL    OUT:  Total OUT: 0 mL    Total NET: 720 mL      PHYSICAL EXAM:  General: NAD, supine, no JVD on Left  Respiratory: b/l air entry, clear  Cardiovascular: S1 S2 reg  Gastrointestinal: soft, NT, BS present  Extremities: 1 plus edema on left lower leg, chronic edema per patient right leg.    CBC Full  -  ( 01 Apr 2018 12:42 )  WBC Count : 7.21 K/uL  Hemoglobin : 8.5 g/dL  Hematocrit : 26.6 %  Platelet Count - Automated : 175 K/uL  Mean Cell Volume : 89.0 fl  Mean Cell Hemoglobin : 28.4 pg  Mean Cell Hemoglobin Concentration : 32.0 gm/dL  Auto Neutrophil # : x  Auto Lymphocyte # : x  Auto Monocyte # : x  Auto Eosinophil # : x  Auto Basophil # : x  Auto Neutrophil % : x  Auto Lymphocyte % : x  Auto Monocyte % : x  Auto Eosinophil % : x  Auto Basophil % : x    04-01    134<L>  |  100  |  51<H>  ----------------------------<  158<H>  4.2   |  22  |  4.29<H>    Ca    8.3<L>      01 Apr 2018 12:42    TPro  7.9  /  Alb  2.8<L>  /  TBili  0.7  /  DBili  x   /  AST  633<H>  /  ALT  738<H>  /  AlkPhos  177<H>  04-01        Sodium, Serum: 134 (04-01 @ 12:42)    Creatinine, Serum: 4.29 (04-01 @ 12:42)    Potassium, Serum: 4.2 (04-01 @ 12:42)    Hemoglobin: 8.5 (04-01 @ 12:42)  Hemoglobin: 8.8 (04-01 @ 06:29)  Hemoglobin: 8.5 (03-31 @ 14:32)

## 2018-04-02 NOTE — PROGRESS NOTE ADULT - SUBJECTIVE AND OBJECTIVE BOX
HOLDING ANTI PLATELET  HEMODYNAMICALLY STABLE  FOR OR  STABLE CARDIOVASCULAR STATUS; CONTINUING WITH PRESENT MANAGEMENT.     DAYO COTTON MD, FACC

## 2018-04-02 NOTE — PROGRESS NOTE ADULT - ASSESSMENT
85 female with a history of DM. HTN, CAD, CHF, CKD with anemia now admitted with nausea, constipation, SOB and LIZ.   patient has symptoms of uremia and needs to have dialysis.   patient is now agreeable for dialysis initiation.   will also give a dose of lactulose for constipation.   will have quintan catheter placed today and initiate dialysis.
85 female with a history of DM. HTN, CAD, CHF, CKD with anemia now admitted with nausea, constipation, SOB and LIZ.   patient has symptoms of uremia and needs to have dialysis.   patient is now agreeable for dialysis initiation.   will also give another dose of lactulose for constipation.   will have quintan catheter placed today and initiate dialysis.   will discuss with cardiology regarding holding brilinta for perma cath placement.
85 female with a history of DM. HTN, CAD, CHF, CKD with anemia now admitted with nausea, constipation, SOB and LIZ.   patient has symptoms of uremia patient is now agreed for dialysis initiation.   she had two treatments so far and has been feeling better.   labs and medications reviewed.   will discuss with cardiology regarding holding brilinta for perma cath placement.
85 female with a history of DM. HTN, CAD, CHF, CKD with anemia now admitted with nausea, constipation, SOB and LIZ.   patient has symptoms of uremia patient is now agreed for dialysis initiation.   she had two treatments so far and has been feeling better.   labs and medications reviewed.   will stop the allopurinol, metoprolol and hydralazine.  will change IVF to D5NS at 60 cc an hour.  will give a dose of lasix today.   will discuss with cardiology regarding stopping the brilinta for perma cath placement.
85 year old Female with DM. HTN, CAD, CHF, CKD, Anemia, early uremia. Tolerates 3rd HD session for 3 hours today.   Epogen for anemia. For permanent HD access placement and outpatient HD arrangements.  Next dialysis on 4/4/18.
Patient is seen and examined in the HD unit. The right IJ cath is without any bleeding no s/s of infn and the blood flow is good. will schedule the patient for insertion of the permacath tomoro  Informed consent is obtained
Paraprotenemia

## 2018-04-02 NOTE — PROGRESS NOTE ADULT - SUBJECTIVE AND OBJECTIVE BOX
Patient is a 85y old  Female who presents with a chief complaint of SOB (20 Mar 2018 15:43)      INTERVAL HPI/OVERNIGHT EVENTS:    MEDICATIONS  (STANDING):  aspirin enteric coated 81 milliGRAM(s) Oral daily  buDESOnide 160 MICROgram(s)/formoterol 4.5 MICROgram(s) Inhaler 2 Puff(s) Inhalation two times a day  dextrose 5% + sodium chloride 0.9%. 1000 milliLiter(s) (60 mL/Hr) IV Continuous <Continuous>  dextrose 5%. 1000 milliLiter(s) (50 mL/Hr) IV Continuous <Continuous>  dextrose 50% Injectable 12.5 Gram(s) IV Push once  dextrose 50% Injectable 25 Gram(s) IV Push once  dextrose 50% Injectable 25 Gram(s) IV Push once  dorzolamide 2% Ophthalmic Solution 1 Drop(s) Both EYES three times a day  heparin  Injectable 5000 Unit(s) SubCutaneous every 12 hours  insulin glargine Injectable (LANTUS) 14 Unit(s) SubCutaneous every morning  insulin lispro (HumaLOG) corrective regimen sliding scale   SubCutaneous three times a day before meals  lactobacillus acidophilus 1 Tablet(s) Oral three times a day with meals  nitroglycerin    2% Ointment 1 Inch(s) Transdermal every 6 hours  ranolazine 500 milliGRAM(s) Oral two times a day  senna 2 Tablet(s) Oral at bedtime    MEDICATIONS  (PRN):  aluminum hydroxide/magnesium hydroxide/simethicone Suspension 30 milliLiter(s) Oral every 4 hours PRN Dyspepsia  dextrose Gel 1 Dose(s) Oral once PRN Blood Glucose LESS THAN 70 milliGRAM(s)/deciliter  glucagon  Injectable 1 milliGRAM(s) IntraMuscular once PRN Glucose LESS THAN 70 milligrams/deciliter  ondansetron Injectable 4 milliGRAM(s) IV Push every 6 hours PRN Nausea and/or Vomiting      Allergies    codeine (Other)    Intolerances        REVIEW OF SYSTEMS:        HEENT - wnl  RESPIRATORY: No cough, wheezing, chills or hemoptysis; No shortness of breath  CARDIOVASCULAR: No chest pain, palpitations, dizziness, or leg swelling  GASTROINTESTINAL: No abdominal or epigastric pain. No nausea, vomiting, or hematemesis; No diarrhea or constipation. No melena or hematochezia.  GENITOURINARY: No dysuria, frequency, hematuria, or incontinence  SKIN: No itching, burning, rashes, or lesions   MUSCULOSKELETAL: No joint pain or swelling; No muscle, back, or extremity pain  PSYCHIATRIC: No depression, anxiety, mood swings, or difficulty sleeping        Vital Signs Last 24 Hrs  T(C): 35.6 (02 Apr 2018 17:45), Max: 37.1 (02 Apr 2018 06:07)  T(F): 96 (02 Apr 2018 17:45), Max: 98.8 (02 Apr 2018 06:07)  HR: 61 (02 Apr 2018 17:45) (57 - 66)  BP: 112/55 (02 Apr 2018 17:45) (99/46 - 126/66)  BP(mean): --  RR: 17 (02 Apr 2018 17:45) (17 - 20)  SpO2: 100% (02 Apr 2018 14:47) (96% - 100%)    PHYSICAL EXAM:  general       HEENT wnl  CHEST/LUNG: Clear to percussion bilaterally; No rales, rhonchi, wheezing, or rubs  HEART: Regular rate and rhythm; No murmurs, rubs, or gallops  ABDOMEN: Soft, Nontender, Nondistended; Bowel sounds present  EXTREMITIES:  2+ Peripheral Pulses, No clubbing, cyanosis, or edema  LYMPH: No lymphadenopathy noted  SKIN: No rashes or lesions    LABS:                        8.5    8.43  )-----------( 154      ( 02 Apr 2018 11:37 )             27.1     04-02    131<L>  |  98  |  60<H>  ----------------------------<  314<H>  4.4   |  21<L>  |  5.26<H>    Ca    8.1<L>      02 Apr 2018 11:37    TPro  7.5  /  Alb  2.5<L>  /  TBili  1.1  /  DBili  x   /  AST  686<H>  /  ALT  857<H>  /  AlkPhos  153<H>  04-02        CAPILLARY BLOOD GLUCOSE      POCT Blood Glucose.: 172 mg/dL (02 Apr 2018 21:37)  POCT Blood Glucose.: 185 mg/dL (02 Apr 2018 16:48)  POCT Blood Glucose.: 290 mg/dL (02 Apr 2018 08:12)  POCT Blood Glucose.: 221 mg/dL (01 Apr 2018 23:30)                RADIOLOGY & ADDITIONAL TESTS:    Imaging Personally Reviewed:  [y ] YES  [ ] NO    Consultant(s) Notes Reviewed:  [y ] YES  [ ] NO    Care Discussed with Consultants/Other Providers [ ] YES  [ ] NO    PROBLEMS:  HEART FAILURE, ELEVATED TROPONIN LEVEL  SHORTNESS OF BREATH; UNABLE TO AMBULATE  Heart failure  Heart failure  Paraproteinemia  NSTEMI CAD  ARF/CKD  Anemia    Care discussed with family,         [  ]   yes  [  ]  No    imp:    stable[ ]    unstable[  v]     improving [   ]       unchanged  [  ]                Plans:   off Aantiplatelets , permacath in AM  HD. prognosis guarded

## 2018-04-02 NOTE — PROGRESS NOTE ADULT - SUBJECTIVE AND OBJECTIVE BOX
INTERVAL History:  Multiple-Myeloma    Allergies    codeine (Other)    Intolerances        MEDICATIONS  (STANDING):  aspirin enteric coated 81 milliGRAM(s) Oral daily  buDESOnide 160 MICROgram(s)/formoterol 4.5 MICROgram(s) Inhaler 2 Puff(s) Inhalation two times a day  dextrose 5% + sodium chloride 0.9%. 1000 milliLiter(s) (60 mL/Hr) IV Continuous <Continuous>  dextrose 5%. 1000 milliLiter(s) (50 mL/Hr) IV Continuous <Continuous>  dextrose 50% Injectable 12.5 Gram(s) IV Push once  dextrose 50% Injectable 25 Gram(s) IV Push once  dextrose 50% Injectable 25 Gram(s) IV Push once  dorzolamide 2% Ophthalmic Solution 1 Drop(s) Both EYES three times a day  heparin  Injectable 5000 Unit(s) SubCutaneous every 12 hours  insulin glargine Injectable (LANTUS) 14 Unit(s) SubCutaneous every morning  insulin lispro (HumaLOG) corrective regimen sliding scale   SubCutaneous three times a day before meals  lactobacillus acidophilus 1 Tablet(s) Oral three times a day with meals  nitroglycerin    2% Ointment 1 Inch(s) Transdermal every 6 hours  ranolazine 500 milliGRAM(s) Oral two times a day  senna 2 Tablet(s) Oral at bedtime    MEDICATIONS  (PRN):  aluminum hydroxide/magnesium hydroxide/simethicone Suspension 30 milliLiter(s) Oral every 4 hours PRN Dyspepsia  dextrose Gel 1 Dose(s) Oral once PRN Blood Glucose LESS THAN 70 milliGRAM(s)/deciliter  glucagon  Injectable 1 milliGRAM(s) IntraMuscular once PRN Glucose LESS THAN 70 milligrams/deciliter  ondansetron Injectable 4 milliGRAM(s) IV Push every 6 hours PRN Nausea and/or Vomiting      Vital Signs Last 24 Hrs  T(C): 37.1 (02 Apr 2018 06:07), Max: 37.1 (02 Apr 2018 06:07)  T(F): 98.8 (02 Apr 2018 06:07), Max: 98.8 (02 Apr 2018 06:07)  HR: 62 (02 Apr 2018 06:07) (57 - 63)  BP: 109/57 (02 Apr 2018 06:07) (99/46 - 115/54)  BP(mean): --  RR: 20 (02 Apr 2018 06:07) (18 - 20)  SpO2: 96% (02 Apr 2018 06:07) (96% - 100%)    PHYSICAL EXAM:  Pallor    EYES: EOMI, PERRLA, conjunctiva and sclera clear  NECK: Supple, No JVD, Normal thyroid  CHEST/LUNG: Clear to percussion bilaterally; No rales, rhonchi,   HEART: Regular rate and rhythm;   ABDOMEN: Soft,     LABS:                        8.5    7.21  )-----------( 175      ( 01 Apr 2018 12:42 )             26.6     04-01    134<L>  |  100  |  51<H>  ----------------------------<  158<H>  4.2   |  22  |  4.29<H>    Ca    8.3<L>      01 Apr 2018 12:42    TPro  7.9  /  Alb  2.8<L>  /  TBili  0.7  /  DBili  x   /  AST  633<H>  /  ALT  738<H>  /  AlkPhos  177<H>  04-01            RADIOLOGY & ADDITIONAL STUDIES:    PATHOLOGY:

## 2018-04-02 NOTE — PROGRESS NOTE ADULT - SUBJECTIVE AND OBJECTIVE BOX
Patient is a 85y old  Female who presents with a chief complaint of SOB (20 Mar 2018 15:43)    aspirin enteric coated 81  heparin  Injectable 5000  nitroglycerin    2% Ointment 1  ranolazine 500    Allergies    codeine (Other)    Intolerances        Vital Signs Last 24 Hrs  T(C): 35.6 (02 Apr 2018 17:45), Max: 37.1 (02 Apr 2018 06:07)  T(F): 96 (02 Apr 2018 17:45), Max: 98.8 (02 Apr 2018 06:07)  HR: 61 (02 Apr 2018 17:45) (57 - 66)  BP: 112/55 (02 Apr 2018 17:45) (99/46 - 126/66)  BP(mean): --  RR: 17 (02 Apr 2018 17:45) (17 - 20)  SpO2: 100% (02 Apr 2018 14:47) (96% - 100%)  I&O's Detail    01 Apr 2018 07:01  -  02 Apr 2018 07:00  --------------------------------------------------------  IN:    dextrose 5% + sodium chloride 0.9%.: 720 mL  Total IN: 720 mL    OUT:  Total OUT: 0 mL    Total NET: 720 mL      02 Apr 2018 07:01  -  02 Apr 2018 18:43  --------------------------------------------------------  IN:  Total IN: 0 mL    OUT:    Other: 1500 mL  Total OUT: 1500 mL    Total NET: -1500 mL          Physical Exam:  General: NAD, resting comfortably in bed  Pulmonary: Nonlabored breathing, no respiratory distress  Cardiovascular: NSR  Abdominal: soft, NT/ND  Extremities: WWP  Pulses:   Right:                                                                          Left:  FEM [ ]2+ [ ]1+ [ ]doppler                                             FEM [ ]2+ [ ]1+ [ ]doppler    POP [ ]2+ [ ]1+ [ ]doppler                                             POP [ ]2+ [ ]1+ [ ]doppler    DP [ ]2+ [ ]1+ [ ]doppler                                                DP [ ]2+ [ ]1+ [ ]doppler  PT[ ]2+ [ ]1+ [ ]doppler                                                  PT [ ]2+ [ ]1+ [ ]doppler      LABS:                        8.5    8.43  )-----------( 154      ( 02 Apr 2018 11:37 )             27.1     04-02    131<L>  |  98  |  60<H>  ----------------------------<  314<H>  4.4   |  21<L>  |  5.26<H>    Ca    8.1<L>      02 Apr 2018 11:37    TPro  7.5  /  Alb  2.5<L>  /  TBili  1.1  /  DBili  x   /  AST  686<H>  /  ALT  857<H>  /  AlkPhos  153<H>  04-02      CAPILLARY BLOOD GLUCOSE      POCT Blood Glucose.: 185 mg/dL (02 Apr 2018 16:48)  POCT Blood Glucose.: 290 mg/dL (02 Apr 2018 08:12)  POCT Blood Glucose.: 221 mg/dL (01 Apr 2018 23:30)    Radiology and Additional Studies:    Assessment and Plan: 85yFemaleHEART FAILURE, ELEVATED TROPONIN LEVEL  SHORTNESS OF BREATH; UNABLE TO AMBULATE    CAD S/P percutaneous coronary angioplasty  PVD (peripheral vascular disease)  HTN (hypertension)  DM (diabetes mellitus screen)  S/P foot surgery  S/P cataract surgery  S/P appendectomy  S/P hysterectomy  Amputation foot, unilat  Central line placement

## 2018-04-03 ENCOUNTER — RESULT REVIEW (OUTPATIENT)
Age: 83
End: 2018-04-03

## 2018-04-03 VITALS
OXYGEN SATURATION: 98 % | SYSTOLIC BLOOD PRESSURE: 128 MMHG | DIASTOLIC BLOOD PRESSURE: 52 MMHG | RESPIRATION RATE: 17 BRPM | HEART RATE: 55 BPM

## 2018-04-03 LAB
AUTO DIFF PNL BLD: ABNORMAL
GLUCOSE BLDC GLUCOMTR-MCNC: 259 MG/DL — HIGH (ref 70–99)
GLUCOSE BLDC GLUCOMTR-MCNC: 260 MG/DL — HIGH (ref 70–99)
GLUCOSE BLDC GLUCOMTR-MCNC: 260 MG/DL — HIGH (ref 70–99)
GLUCOSE BLDC GLUCOMTR-MCNC: 370 MG/DL — HIGH (ref 70–99)
HCT VFR BLD CALC: 26.4 % — LOW (ref 34.5–45)
HGB BLD-MCNC: 8.6 G/DL — LOW (ref 11.5–15.5)
MCHC RBC-ENTMCNC: 29 PG — SIGNIFICANT CHANGE UP (ref 27–34)
MCHC RBC-ENTMCNC: 32.6 GM/DL — SIGNIFICANT CHANGE UP (ref 32–36)
MCV RBC AUTO: 88.9 FL — SIGNIFICANT CHANGE UP (ref 80–100)
NRBC # BLD: 17 /100 WBCS — HIGH (ref 0–0)
PLATELET # BLD AUTO: 119 K/UL — LOW (ref 150–400)
RBC # BLD: 2.97 M/UL — LOW (ref 3.8–5.2)
RBC # FLD: 14.6 % — HIGH (ref 10.3–14.5)
WBC # BLD: 9.77 K/UL — SIGNIFICANT CHANGE UP (ref 3.8–10.5)
WBC # FLD AUTO: 9.77 K/UL — SIGNIFICANT CHANGE UP (ref 3.8–10.5)

## 2018-04-03 PROCEDURE — 99239 HOSP IP/OBS DSCHRG MGMT >30: CPT

## 2018-04-03 PROCEDURE — 88300 SURGICAL PATH GROSS: CPT | Mod: 26,59

## 2018-04-03 PROCEDURE — 71045 X-RAY EXAM CHEST 1 VIEW: CPT | Mod: 26

## 2018-04-03 RX ORDER — SODIUM CHLORIDE 9 MG/ML
1000 INJECTION, SOLUTION INTRAVENOUS
Qty: 0 | Refills: 0 | Status: DISCONTINUED | OUTPATIENT
Start: 2018-04-03 | End: 2018-04-03

## 2018-04-03 RX ORDER — DEXTROSE 50 % IN WATER 50 %
25 SYRINGE (ML) INTRAVENOUS ONCE
Qty: 0 | Refills: 0 | Status: DISCONTINUED | OUTPATIENT
Start: 2018-04-03 | End: 2018-04-03

## 2018-04-03 RX ORDER — HEPARIN SODIUM 5000 [USP'U]/ML
5000 INJECTION INTRAVENOUS; SUBCUTANEOUS EVERY 12 HOURS
Qty: 0 | Refills: 0 | Status: DISCONTINUED | OUTPATIENT
Start: 2018-04-03 | End: 2018-04-03

## 2018-04-03 RX ORDER — DORZOLAMIDE HYDROCHLORIDE 20 MG/ML
1 SOLUTION/ DROPS OPHTHALMIC THREE TIMES A DAY
Qty: 0 | Refills: 0 | Status: DISCONTINUED | OUTPATIENT
Start: 2018-04-03 | End: 2018-04-03

## 2018-04-03 RX ORDER — NITROGLYCERIN 6.5 MG
1 CAPSULE, EXTENDED RELEASE ORAL EVERY 6 HOURS
Qty: 0 | Refills: 0 | Status: DISCONTINUED | OUTPATIENT
Start: 2018-04-03 | End: 2018-04-03

## 2018-04-03 RX ORDER — TICAGRELOR 90 MG/1
90 TABLET ORAL
Qty: 0 | Refills: 0 | Status: DISCONTINUED | OUTPATIENT
Start: 2018-04-03 | End: 2018-04-03

## 2018-04-03 RX ORDER — SENNA PLUS 8.6 MG/1
2 TABLET ORAL AT BEDTIME
Qty: 0 | Refills: 0 | Status: DISCONTINUED | OUTPATIENT
Start: 2018-04-03 | End: 2018-04-03

## 2018-04-03 RX ORDER — BUDESONIDE AND FORMOTEROL FUMARATE DIHYDRATE 160; 4.5 UG/1; UG/1
2 AEROSOL RESPIRATORY (INHALATION)
Qty: 0 | Refills: 0 | Status: DISCONTINUED | OUTPATIENT
Start: 2018-04-03 | End: 2018-04-03

## 2018-04-03 RX ORDER — DEXTROSE 50 % IN WATER 50 %
12.5 SYRINGE (ML) INTRAVENOUS ONCE
Qty: 0 | Refills: 0 | Status: DISCONTINUED | OUTPATIENT
Start: 2018-04-03 | End: 2018-04-03

## 2018-04-03 RX ORDER — GLUCAGON INJECTION, SOLUTION 0.5 MG/.1ML
1 INJECTION, SOLUTION SUBCUTANEOUS ONCE
Qty: 0 | Refills: 0 | Status: DISCONTINUED | OUTPATIENT
Start: 2018-04-03 | End: 2018-04-03

## 2018-04-03 RX ORDER — ONDANSETRON 8 MG/1
4 TABLET, FILM COATED ORAL EVERY 6 HOURS
Qty: 0 | Refills: 0 | Status: DISCONTINUED | OUTPATIENT
Start: 2018-04-03 | End: 2018-04-03

## 2018-04-03 RX ORDER — INSULIN GLARGINE 100 [IU]/ML
14 INJECTION, SOLUTION SUBCUTANEOUS EVERY MORNING
Qty: 0 | Refills: 0 | Status: DISCONTINUED | OUTPATIENT
Start: 2018-04-03 | End: 2018-04-03

## 2018-04-03 RX ORDER — RANOLAZINE 500 MG/1
500 TABLET, FILM COATED, EXTENDED RELEASE ORAL
Qty: 0 | Refills: 0 | Status: DISCONTINUED | OUTPATIENT
Start: 2018-04-03 | End: 2018-04-03

## 2018-04-03 RX ORDER — LACTOBACILLUS ACIDOPHILUS 100MM CELL
1 CAPSULE ORAL
Qty: 0 | Refills: 0 | Status: DISCONTINUED | OUTPATIENT
Start: 2018-04-03 | End: 2018-04-03

## 2018-04-03 RX ORDER — SODIUM CHLORIDE 9 MG/ML
1000 INJECTION INTRAMUSCULAR; INTRAVENOUS; SUBCUTANEOUS
Qty: 0 | Refills: 0 | Status: DISCONTINUED | OUTPATIENT
Start: 2018-04-03 | End: 2018-04-03

## 2018-04-03 RX ORDER — DEXTROSE 50 % IN WATER 50 %
1 SYRINGE (ML) INTRAVENOUS ONCE
Qty: 0 | Refills: 0 | Status: DISCONTINUED | OUTPATIENT
Start: 2018-04-03 | End: 2018-04-03

## 2018-04-03 RX ORDER — ASPIRIN/CALCIUM CARB/MAGNESIUM 324 MG
81 TABLET ORAL DAILY
Qty: 0 | Refills: 0 | Status: DISCONTINUED | OUTPATIENT
Start: 2018-04-03 | End: 2018-04-03

## 2018-04-03 RX ORDER — INSULIN LISPRO 100/ML
VIAL (ML) SUBCUTANEOUS
Qty: 0 | Refills: 0 | Status: DISCONTINUED | OUTPATIENT
Start: 2018-04-03 | End: 2018-04-03

## 2018-04-03 RX ADMIN — RANOLAZINE 500 MILLIGRAM(S): 500 TABLET, FILM COATED, EXTENDED RELEASE ORAL at 17:12

## 2018-04-03 RX ADMIN — Medication 81 MILLIGRAM(S): at 11:44

## 2018-04-03 RX ADMIN — Medication 1 TABLET(S): at 11:44

## 2018-04-03 RX ADMIN — BUDESONIDE AND FORMOTEROL FUMARATE DIHYDRATE 2 PUFF(S): 160; 4.5 AEROSOL RESPIRATORY (INHALATION) at 17:13

## 2018-04-03 RX ADMIN — Medication 10: at 17:12

## 2018-04-03 RX ADMIN — BUDESONIDE AND FORMOTEROL FUMARATE DIHYDRATE 2 PUFF(S): 160; 4.5 AEROSOL RESPIRATORY (INHALATION) at 06:51

## 2018-04-03 RX ADMIN — SODIUM CHLORIDE 30 MILLILITER(S): 9 INJECTION INTRAMUSCULAR; INTRAVENOUS; SUBCUTANEOUS at 10:16

## 2018-04-03 RX ADMIN — Medication 6: at 11:43

## 2018-04-03 NOTE — PROGRESS NOTE ADULT - SUBJECTIVE AND OBJECTIVE BOX
much imoroved  no N/V  VSS  copious amount of urine  f/u c BMP  resolving ARF  s/p MI  Acute on chronic systolic CHF  restart Brilinta 90mg po bid  PT/OT  f/u BMP

## 2018-04-03 NOTE — PROGRESS NOTE ADULT - PROBLEM SELECTOR PLAN 1
d/w patient in detail about blood findings, inlcuding possibility of a malignacy like multiple myeloma and need for BM Biopsy to confirm diagnosis  - patient states because of age she would not take chemotherapy, so because of this she does not want to pursue BM Biopsy  - patient understands consequences of missing diagnosis and not getting treatment  - suggest pallaitive care consult.
? Multiple-Myeloma.  Bone Marrow Bx if Pt. Agrees.
? Multiple-Myeloma.  Pt. refuses Bone Marrow Bx.  Risk/Benefits Explained.  Renal/Cardiology Follow up.
IgG Multiple-Myeloma.  Bone Marrow Exam.
IgG Multiple-Myeloma.  Bone-Marrow exam if Pt. Agrees.
IgG Multiple-Myeloma.  CBC  Bone Marrow exam.

## 2018-04-03 NOTE — CHART NOTE - NSCHARTNOTEFT_GEN_A_CORE
Patient is a 85y old  Female who presents with a chief complaint of SOB (20 Mar 2018 15:43)      HPI:  PT C hX dm cad pci recently, for 2 days > SOB on exertion, No CP, No fever, chills, N/V/C/D (20 Mar 2018 09:19)    84 yo F PMH CAD s/p PCI, PVD, HTN, DM, p/w SOB in ED admitted to medicine for acute on chronic CHF and NSTEMI.  Pt was seen by cardiology with plan for cath eventually, pt developed ESRD with need for long term HD. Pt later denied intervention for cath with troponins peaking at 22.      RRT called as patient became unresponsive.  Noted to have a lateral gaze and facial droop.  Code stroke called at that time.  Not able to obtain a measurable BP.  Pt found to be bradycardic to 30's with LBBB on tele monitor.  Atropine 0.5 X2 was given.  Pt subsequently became apneic with and was subsequently intubated.  Pt found to not have a pulse seconds later. Code blue was called, ACLS was performed and   Allergies    codeine (Other)    Intolerances    MEDICATIONS  (STANDING):  aspirin enteric coated 81 milliGRAM(s) Oral daily  buDESOnide 160 MICROgram(s)/formoterol 4.5 MICROgram(s) Inhaler 2 Puff(s) Inhalation two times a day  dextrose 5%. 1000 milliLiter(s) (50 mL/Hr) IV Continuous <Continuous>  dextrose 50% Injectable 12.5 Gram(s) IV Push once  dextrose 50% Injectable 25 Gram(s) IV Push once  dextrose 50% Injectable 25 Gram(s) IV Push once  dorzolamide 2% Ophthalmic Solution 1 Drop(s) Both EYES three times a day  heparin  Injectable 5000 Unit(s) SubCutaneous every 12 hours  insulin glargine Injectable (LANTUS) 14 Unit(s) SubCutaneous every morning  insulin lispro (HumaLOG) corrective regimen sliding scale   SubCutaneous three times a day before meals  lactobacillus acidophilus 1 Tablet(s) Oral three times a day with meals  nitroglycerin    2% Ointment 1 Inch(s) Transdermal every 6 hours  ranolazine 500 milliGRAM(s) Oral two times a day  senna 2 Tablet(s) Oral at bedtime  sodium chloride 0.9%. 1000 milliLiter(s) (30 mL/Hr) IV Continuous <Continuous>    MEDICATIONS  (PRN):  aluminum hydroxide/magnesium hydroxide/simethicone Suspension 30 milliLiter(s) Oral every 4 hours PRN Dyspepsia  dextrose Gel 1 Dose(s) Oral once PRN Blood Glucose LESS THAN 70 milliGRAM(s)/deciliter  glucagon  Injectable 1 milliGRAM(s) IntraMuscular once PRN Glucose LESS THAN 70 milligrams/deciliter  ondansetron Injectable 4 milliGRAM(s) IV Push every 6 hours PRN Nausea and/or Vomiting      Daily Height in cm: 149.86 (2018 06:12)    Daily Weight in k.2 (2018 05:47)    Drug Dosing Weight  Height (cm): 149.86 (2018 06:12)  Weight (kg): 60.6 (2018 06:12)  BMI (kg/m2): 27 (2018 06:12)  BSA (m2): 1.55 (2018 06:12)    PAST MEDICAL & SURGICAL HISTORY:  CAD S/P percutaneous coronary angioplasty  PVD (peripheral vascular disease)  HTN (hypertension)  DM (diabetes mellitus screen)  S/P foot surgery: left foot surgery with sage s/p fx.  S/P cataract surgery  S/P appendectomy  S/P hysterectomy  Amputation foot, unilat      FAMILY HISTORY:  No pertinent family history in first degree relatives      SOCIAL HISTORY:    ADVANCE DIRECTIVES:    REVIEW OF SYSTEMS:    CONSTITUTIONAL: No fever, weight loss, or fatigue  EYES: No eye pain, visual disturbances, or discharge  ENMT:  No difficulty hearing, tinnitus, vertigo; No sinus or throat pain  NECK: No pain or stiffness  BREASTS: No pain, masses, or nipple discharge  RESPIRATORY: No cough, wheezing, chills or hemoptysis; No shortness of breath  CARDIOVASCULAR: No chest pain, palpitations, dizziness, or leg swelling  GASTROINTESTINAL: No abdominal or epigastric pain. No nausea, vomiting, or hematemesis; No diarrhea or constipation. No melena or hematochezia.  GENITOURINARY: No dysuria, frequency, hematuria, or incontinence  NEUROLOGICAL: No headaches, memory loss, loss of strength, numbness, or tremors  SKIN: No itching, burning, rashes, or lesions   LYMPH NODES: No enlarged glands  ENDOCRINE: No heat or cold intolerance; No hair loss  MUSCULOSKELETAL: No joint pain or swelling; No muscle, back, or extremity pain  PSYCHIATRIC: No depression, anxiety, mood swings, or difficulty sleeping  HEME/LYMPH: No easy bruising, or bleeding gums  ALLERGY AND IMMUNOLOGIC: No hives or eczema          ICU Vital Signs Last 24 Hrs  T(C): 35.4 (2018 11:33), Max: 36.7 (2018 09:55)  T(F): 95.8 (2018 11:33), Max: 98 (2018 09:55)  HR: 55 (2018 18:20) (49 - 58)  BP: 128/52 (2018 18:20) (101/46 - 132/57)  BP(mean): 74 (2018 10:12) (74 - 75)  ABP: --  ABP(mean): --  RR: 17 (2018 18:20) (17 - 23)  SpO2: 98% (2018 18:20) (94% - 98%)          I&O's Detail    2018 07:01  -  2018 07:00  --------------------------------------------------------  IN:    dextrose 5% + sodium chloride 0.9%: 720 mL  Total IN: 720 mL    OUT:    Other: 1500 mL  Total OUT: 1500 mL    Total NET: -780 mL          PHYSICAL EXAM:    GENERAL: NAD, well-groomed, well-developed  HEAD:  Atraumatic, Normocephalic  EYES: EOMI, PERRLA, conjunctiva and sclera clear  ENMT: No tonsillar erythema, exudates, or enlargement; Moist mucous membranes, Good dentition, No lesions  NECK: Supple, No JVD, Normal thyroid  NERVOUS SYSTEM:  Alert & Oriented X3, Good concentration; Motor Strength 5/5 B/L upper and lower extremities; DTRs 2+ intact and symmetric  CHEST/LUNG: Clear to percussion bilaterally; No rales, rhonchi, wheezing, or rubs  HEART: Regular rate and rhythm; No murmurs, rubs, or gallops  ABDOMEN: Soft, Nontender, Nondistended; Bowel sounds present  EXTREMITIES:  2+ Peripheral Pulses, No clubbing, cyanosis, or edema  LYMPH: No lymphadenopathy noted  SKIN: No rashes or lesions    LABS:  CBC Full  -  ( 2018 07:39 )  WBC Count : 9.77 K/uL  Hemoglobin : 8.6 g/dL  Hematocrit : 26.4 %  Platelet Count - Automated : 119 K/uL  Mean Cell Volume : 88.9 fl  Mean Cell Hemoglobin : 29.0 pg  Mean Cell Hemoglobin Concentration : 32.6 gm/dL  Auto Neutrophil # : x  Auto Lymphocyte # : x  Auto Monocyte # : x  Auto Eosinophil # : x  Auto Basophil # : x  Auto Neutrophil % : x  Auto Lymphocyte % : x  Auto Monocyte % : x  Auto Eosinophil % : x  Auto Basophil % : x        131<L>  |  98  |  60<H>  ----------------------------<  314<H>  4.4   |  21<L>  |  5.26<H>    Ca    8.1<L>      2018 11:37    TPro  7.5  /  Alb  2.5<L>  /  TBili  1.1  /  DBili  x   /  AST  686<H>  /  ALT  857<H>  /  AlkPhos  153<H>      CAPILLARY BLOOD GLUCOSE      POCT Blood Glucose.: 259 mg/dL (2018 21:19)                Culture - Urine (collected 28 Mar 2018 14:51)  Source: .Urine Catheterized  Final Report (29 Mar 2018 11:23):    No growth        EKG:    ECHO, US:    RADIOLOGY:    CRITICAL CARE TIME SPENT: Patient is a 85y old  Female who presents with a chief complaint of SOB (20 Mar 2018 15:43)      HPI:  PT C hX dm cad pci recently, for 2 days > SOB on exertion, No CP, No fever, chills, N/V/C/D (20 Mar 2018 09:19)    86 yo F PMH CAD s/p PCI, PVD, HTN, DM, p/w SOB in ED admitted to medicine for acute on chronic CHF and NSTEMI.  Pt was seen by cardiology with plan for cath eventually, pt developed ESRD with need for long term HD. Pt later denied intervention for cath with troponins peaking at 22.      RRT called as patient became unresponsive.  Noted to have a lateral gaze and facial droop.  Code stroke called at that time.  Not able to obtain a measurable BP.  Pt found to be bradycardic to 30's with LBBB on tele monitor.  Atropine 0.5 X2 was given.  Pt subsequently became apneic with and was subsequently intubated.  Pt found to not have a pulse seconds later. Code blue was called, ACLS was performed for about 20 min. Pt did not regain a pulse. No objections by team to call code off,as further care would be futile.  Time of death was  pronounced 21:55     PAST MEDICAL & SURGICAL HISTORY:  CAD S/P percutaneous coronary angioplasty  PVD (peripheral vascular disease)  HTN (hypertension)  DM (diabetes mellitus screen)  S/P foot surgery: left foot surgery with sage s/p fx.  S/P cataract surgery  S/P appendectomy  S/P hysterectomy  Amputation foot, unilat      REVIEW OF SYSTEMS:    not able to be obtained pt unresponsive         ICU Vital Signs Last 24 Hrs  T(C): 35.4 (03 Apr 2018 11:33), Max: 36.7 (03 Apr 2018 09:55)  T(F): 95.8 (03 Apr 2018 11:33), Max: 98 (03 Apr 2018 09:55)  HR: 55 (03 Apr 2018 18:20) (49 - 58)  BP: 128/52 (03 Apr 2018 18:20) (101/46 - 132/57)  BP(mean): 74 (03 Apr 2018 10:12) (74 - 75)  ABP: --  ABP(mean): --  RR: 17 (03 Apr 2018 18:20) (17 - 23)  SpO2: 98% (03 Apr 2018 18:20) (94% - 98%)      I&O's Detail    02 Apr 2018 07:01  -  03 Apr 2018 07:00  --------------------------------------------------------  IN:    dextrose 5% + sodium chloride 0.9%: 720 mL  Total IN: 720 mL    OUT:    Other: 1500 mL  Total OUT: 1500 mL    Total NET: -780 mL        PHYSICAL EXAM:    GENERAL: unresponsive   EYES: lateral eye gaze   NERVOUS SYSTEM:  unresponsive, apneic, without a pulse    CHEST/LUNG: apneic, BS coarse with BVM ventilation   HEART: pulse absent   ABDOMEN: Soft, Nontender, Nondistended; Bowel sounds present  EXTREMITIES:  extremities cold       LABS:  CBC Full  -  ( 03 Apr 2018 07:39 )  WBC Count : 9.77 K/uL  Hemoglobin : 8.6 g/dL  Hematocrit : 26.4 %  Platelet Count - Automated : 119 K/uL  Mean Cell Volume : 88.9 fl  Mean Cell Hemoglobin : 29.0 pg  Mean Cell Hemoglobin Concentration : 32.6 gm/dL  Auto Neutrophil # : x  Auto Lymphocyte # : x  Auto Monocyte # : x  Auto Eosinophil # : x  Auto Basophil # : x  Auto Neutrophil % : x  Auto Lymphocyte % : x  Auto Monocyte % : x  Auto Eosinophil % : x  Auto Basophil % : x    04-02    131<L>  |  98  |  60<H>  ----------------------------<  314<H>  4.4   |  21<L>  |  5.26<H>    Ca    8.1<L>      02 Apr 2018 11:37    TPro  7.5  /  Alb  2.5<L>  /  TBili  1.1  /  DBili  x   /  AST  686<H>  /  ALT  857<H>  /  AlkPhos  153<H>  04-02    CAPILLARY BLOOD GLUCOSE      POCT Blood Glucose.: 259 mg/dL (03 Apr 2018 21:19)      Culture - Urine (collected 28 Mar 2018 14:51)  Source: .Urine Catheterized  Final Report (29 Mar 2018 11:23):    No growth

## 2018-04-03 NOTE — DISCHARGE NOTE FOR THE EXPIRED PATIENT - HOSPITAL COURSE
As per chart, patient had been admitted for NSTEMI, CHF exacerbation, and developed ESRD, recently had permacath placed for HD.    RRT called as patient was not responsive.  Initially thought to be CVA as patient with lateral gaze and facial droop so code Stroke was called.  BP was not recordable and patient noted to be elodia to the 40s on monitor, atropine given, pulse was lost, Code blue called and ran as per ACLS protocol by ICU team.  Rhythm asystole throughout code.  ROSC initially achieved after 11 minutes of code time but pulse quickly lost again, Code blue continued.  Decision made to call off code as further care would be futile.      Spoke with daughter/NOK, she could not make a decision regarding autopsy during our conversation, was emotional, will decide eventually.

## 2018-04-03 NOTE — CHART NOTE - NSCHARTNOTEFT_GEN_A_CORE
Patient is a 85y old  Female who presents with a chief complaint of SOB (20 Mar 2018 15:43)      HPI:  PT C hX dm cad pci recently, for 2 days > SOB on exertion, No CP, No fever, chills, N/V/C/D (20 Mar 2018 09:19)    84 yo F PMH CAD s/p PCI, PVD, HTN, DM, p/w SOB in ED admitted to medicine for acute on chronic CHF and NSTEMI.  Pt was seen by cardiology with plan for cath eventually, pt developed ESRD with need for long term HD. Pt later denied intervention for cath with troponins peaking at 22.      RRT called as patient became unresponsive.  Noted to have a lateral gaze and facial droop.  Code stroke called at that time.  Not able to obtain a measurable BP.  Pt found to be bradycardic to 30's with LBBB on tele monitor.  Atropine 0.5 X2 was given.  Pt subsequently became apneic with and was subsequently intubated.  Pt found to not have a pulse seconds later. Code blue was called, ACLS was performed and       Allergies    codeine (Other)    Intolerances    PAST MEDICAL & SURGICAL HISTORY:  CAD S/P percutaneous coronary angioplasty  PVD (peripheral vascular disease)  HTN (hypertension)  DM (diabetes mellitus screen)  S/P foot surgery: left foot surgery with sage s/p fx.  S/P cataract surgery  S/P appendectomy  S/P hysterectomy  Amputation foot, unilat      FAMILY HISTORY:  No pertinent family history in first degree relatives      SOCIAL HISTORY:    ADVANCE DIRECTIVES:    REVIEW OF SYSTEMS:    CONSTITUTIONAL: No fever, weight loss, or fatigue  EYES: No eye pain, visual disturbances, or discharge  ENMT:  No difficulty hearing, tinnitus, vertigo; No sinus or throat pain  NECK: No pain or stiffness  BREASTS: No pain, masses, or nipple discharge  RESPIRATORY: No cough, wheezing, chills or hemoptysis; No shortness of breath  CARDIOVASCULAR: No chest pain, palpitations, dizziness, or leg swelling  GASTROINTESTINAL: No abdominal or epigastric pain. No nausea, vomiting, or hematemesis; No diarrhea or constipation. No melena or hematochezia.  GENITOURINARY: No dysuria, frequency, hematuria, or incontinence  NEUROLOGICAL: No headaches, memory loss, loss of strength, numbness, or tremors  SKIN: No itching, burning, rashes, or lesions   LYMPH NODES: No enlarged glands  ENDOCRINE: No heat or cold intolerance; No hair loss  MUSCULOSKELETAL: No joint pain or swelling; No muscle, back, or extremity pain  PSYCHIATRIC: No depression, anxiety, mood swings, or difficulty sleeping  HEME/LYMPH: No easy bruising, or bleeding gums  ALLERGY AND IMMUNOLOGIC: No hives or eczema          ICU Vital Signs Last 24 Hrs  T(C): 35.4 (03 Apr 2018 11:33), Max: 36.7 (03 Apr 2018 09:55)  T(F): 95.8 (03 Apr 2018 11:33), Max: 98 (03 Apr 2018 09:55)  HR: 55 (03 Apr 2018 18:20) (49 - 58)  BP: 128/52 (03 Apr 2018 18:20) (101/46 - 132/57)  BP(mean): 74 (03 Apr 2018 10:12) (74 - 75)  ABP: --  ABP(mean): --  RR: 17 (03 Apr 2018 18:20) (17 - 23)  SpO2: 98% (03 Apr 2018 18:20) (94% - 98%)          I&O's Detail    02 Apr 2018 07:01  -  03 Apr 2018 07:00  --------------------------------------------------------  IN:    dextrose 5% + sodium chloride 0.9%: 720 mL  Total IN: 720 mL    OUT:    Other: 1500 mL  Total OUT: 1500 mL    Total NET: -780 mL          PHYSICAL EXAM:    GENERAL: NAD, well-groomed, well-developed  HEAD:  Atraumatic, Normocephalic  EYES: EOMI, PERRLA, conjunctiva and sclera clear  ENMT: No tonsillar erythema, exudates, or enlargement; Moist mucous membranes, Good dentition, No lesions  NECK: Supple, No JVD, Normal thyroid  NERVOUS SYSTEM:  Alert & Oriented X3, Good concentration; Motor Strength 5/5 B/L upper and lower extremities; DTRs 2+ intact and symmetric  CHEST/LUNG: Clear to percussion bilaterally; No rales, rhonchi, wheezing, or rubs  HEART: Regular rate and rhythm; No murmurs, rubs, or gallops  ABDOMEN: Soft, Nontender, Nondistended; Bowel sounds present  EXTREMITIES:  2+ Peripheral Pulses, No clubbing, cyanosis, or edema  LYMPH: No lymphadenopathy noted  SKIN: No rashes or lesions    LABS:  CBC Full  -  ( 03 Apr 2018 07:39 )  WBC Count : 9.77 K/uL  Hemoglobin : 8.6 g/dL  Hematocrit : 26.4 %  Platelet Count - Automated : 119 K/uL  Mean Cell Volume : 88.9 fl  Mean Cell Hemoglobin : 29.0 pg  Mean Cell Hemoglobin Concentration : 32.6 gm/dL  Auto Neutrophil # : x  Auto Lymphocyte # : x  Auto Monocyte # : x  Auto Eosinophil # : x  Auto Basophil # : x  Auto Neutrophil % : x  Auto Lymphocyte % : x  Auto Monocyte % : x  Auto Eosinophil % : x  Auto Basophil % : x    04-02    131<L>  |  98  |  60<H>  ----------------------------<  314<H>  4.4   |  21<L>  |  5.26<H>    Ca    8.1<L>      02 Apr 2018 11:37    TPro  7.5  /  Alb  2.5<L>  /  TBili  1.1  /  DBili  x   /  AST  686<H>  /  ALT  857<H>  /  AlkPhos  153<H>  04-02    CAPILLARY BLOOD GLUCOSE      POCT Blood Glucose.: 259 mg/dL (03 Apr 2018 21:19)                Culture - Urine (collected 28 Mar 2018 14:51)  Source: .Urine Catheterized  Final Report (29 Mar 2018 11:23):    No growth        EKG:    ECHO, US:    RADIOLOGY:    CRITICAL CARE TIME SPENT:

## 2018-04-03 NOTE — PROGRESS NOTE ADULT - SUBJECTIVE AND OBJECTIVE BOX
INTERVAL History:  WEAK  Multiple-Myeloma  Renal-Failure.  CHF    Allergies    codeine (Other)    Intolerances        MEDICATIONS  (STANDING):  aspirin enteric coated 81 milliGRAM(s) Oral daily  buDESOnide 160 MICROgram(s)/formoterol 4.5 MICROgram(s) Inhaler 2 Puff(s) Inhalation two times a day  dextrose 5%. 1000 milliLiter(s) (50 mL/Hr) IV Continuous <Continuous>  dextrose 50% Injectable 12.5 Gram(s) IV Push once  dextrose 50% Injectable 25 Gram(s) IV Push once  dextrose 50% Injectable 25 Gram(s) IV Push once  dorzolamide 2% Ophthalmic Solution 1 Drop(s) Both EYES three times a day  heparin  Injectable 5000 Unit(s) SubCutaneous every 12 hours  insulin glargine Injectable (LANTUS) 14 Unit(s) SubCutaneous every morning  insulin lispro (HumaLOG) corrective regimen sliding scale   SubCutaneous three times a day before meals  lactobacillus acidophilus 1 Tablet(s) Oral three times a day with meals  nitroglycerin    2% Ointment 1 Inch(s) Transdermal every 6 hours  ranolazine 500 milliGRAM(s) Oral two times a day  senna 2 Tablet(s) Oral at bedtime  sodium chloride 0.9%. 1000 milliLiter(s) (30 mL/Hr) IV Continuous <Continuous>  ticagrelor 90 milliGRAM(s) Oral two times a day    MEDICATIONS  (PRN):  aluminum hydroxide/magnesium hydroxide/simethicone Suspension 30 milliLiter(s) Oral every 4 hours PRN Dyspepsia  dextrose Gel 1 Dose(s) Oral once PRN Blood Glucose LESS THAN 70 milliGRAM(s)/deciliter  glucagon  Injectable 1 milliGRAM(s) IntraMuscular once PRN Glucose LESS THAN 70 milligrams/deciliter  ondansetron Injectable 4 milliGRAM(s) IV Push every 6 hours PRN Nausea and/or Vomiting      Vital Signs Last 24 Hrs  T(C): 36.4 (03 Apr 2018 11:00), Max: 36.7 (03 Apr 2018 09:55)  T(F): 97.6 (03 Apr 2018 11:00), Max: 98 (03 Apr 2018 09:55)  HR: 51 (03 Apr 2018 11:00) (49 - 65)  BP: 111/53 (03 Apr 2018 11:00) (101/46 - 132/57)  BP(mean): 74 (03 Apr 2018 10:12) (74 - 75)  RR: 23 (03 Apr 2018 11:00) (17 - 23)  SpO2: 96% (03 Apr 2018 11:00) (94% - 100%)    PHYSICAL EXAM:    Pallor  EYES: EOMI, PERRLA, conjunctiva and sclera clear  NECK: Supple, No JVD, Normal thyroid  CHEST/LUNG:  rales, rhonchi,     ABDOMEN: Soft, Nontender.          LABS:                        8.6    9.77  )-----------( 119      ( 03 Apr 2018 07:39 )             26.4     04-02    131<L>  |  98  |  60<H>  ----------------------------<  314<H>  4.4   |  21<L>  |  5.26<H>    Ca    8.1<L>      02 Apr 2018 11:37    TPro  7.5  /  Alb  2.5<L>  /  TBili  1.1  /  DBili  x   /  AST  686<H>  /  ALT  857<H>  /  AlkPhos  153<H>  04-02            RADIOLOGY & ADDITIONAL STUDIES:    PATHOLOGY:

## 2018-04-03 NOTE — PROGRESS NOTE ADULT - PROVIDER SPECIALTY LIST ADULT
Cardiology
Family Medicine
Heme/Onc
Nephrology
Vascular Surgery
Family Medicine
Family Medicine
Nephrology
Heme/Onc

## 2018-04-04 LAB — SURGICAL PATHOLOGY FINAL REPORT - CH: SIGNIFICANT CHANGE UP

## 2018-04-05 DIAGNOSIS — I50.23 ACUTE ON CHRONIC SYSTOLIC (CONGESTIVE) HEART FAILURE: ICD-10-CM

## 2018-04-05 DIAGNOSIS — D89.2 HYPERGAMMAGLOBULINEMIA, UNSPECIFIED: ICD-10-CM

## 2018-04-05 DIAGNOSIS — N17.9 ACUTE KIDNEY FAILURE, UNSPECIFIED: ICD-10-CM

## 2018-04-05 DIAGNOSIS — Z90.710 ACQUIRED ABSENCE OF BOTH CERVIX AND UTERUS: ICD-10-CM

## 2018-04-05 DIAGNOSIS — Z89.431 ACQUIRED ABSENCE OF RIGHT FOOT: ICD-10-CM

## 2018-04-05 DIAGNOSIS — I21.9 ACUTE MYOCARDIAL INFARCTION, UNSPECIFIED: ICD-10-CM

## 2018-04-05 DIAGNOSIS — E11.51 TYPE 2 DIABETES MELLITUS WITH DIABETIC PERIPHERAL ANGIOPATHY WITHOUT GANGRENE: ICD-10-CM

## 2018-04-05 DIAGNOSIS — Z98.49 CATARACT EXTRACTION STATUS, UNSPECIFIED EYE: ICD-10-CM

## 2018-04-05 DIAGNOSIS — Z79.4 LONG TERM (CURRENT) USE OF INSULIN: ICD-10-CM

## 2018-04-05 DIAGNOSIS — Z95.5 PRESENCE OF CORONARY ANGIOPLASTY IMPLANT AND GRAFT: ICD-10-CM

## 2018-04-05 DIAGNOSIS — Z99.2 DEPENDENCE ON RENAL DIALYSIS: ICD-10-CM

## 2018-04-05 DIAGNOSIS — E83.41 HYPERMAGNESEMIA: ICD-10-CM

## 2018-04-05 DIAGNOSIS — I44.7 LEFT BUNDLE-BRANCH BLOCK, UNSPECIFIED: ICD-10-CM

## 2018-04-05 DIAGNOSIS — H54.415A BLINDNESS RIGHT EYE CATEGORY 5, NORMAL VISION LEFT EYE: ICD-10-CM

## 2018-04-05 DIAGNOSIS — Z79.82 LONG TERM (CURRENT) USE OF ASPIRIN: ICD-10-CM

## 2018-04-05 DIAGNOSIS — K59.00 CONSTIPATION, UNSPECIFIED: ICD-10-CM

## 2018-04-05 DIAGNOSIS — N18.6 END STAGE RENAL DISEASE: ICD-10-CM

## 2018-04-05 DIAGNOSIS — R19.7 DIARRHEA, UNSPECIFIED: ICD-10-CM

## 2018-04-05 DIAGNOSIS — E11.22 TYPE 2 DIABETES MELLITUS WITH DIABETIC CHRONIC KIDNEY DISEASE: ICD-10-CM

## 2018-04-05 DIAGNOSIS — Z88.5 ALLERGY STATUS TO NARCOTIC AGENT: ICD-10-CM

## 2018-04-05 DIAGNOSIS — I13.2 HYPERTENSIVE HEART AND CHRONIC KIDNEY DISEASE WITH HEART FAILURE AND WITH STAGE 5 CHRONIC KIDNEY DISEASE, OR END STAGE RENAL DISEASE: ICD-10-CM

## 2018-04-05 DIAGNOSIS — D64.9 ANEMIA, UNSPECIFIED: ICD-10-CM

## 2018-04-05 DIAGNOSIS — R00.1 BRADYCARDIA, UNSPECIFIED: ICD-10-CM

## 2018-04-05 DIAGNOSIS — I25.10 ATHEROSCLEROTIC HEART DISEASE OF NATIVE CORONARY ARTERY WITHOUT ANGINA PECTORIS: ICD-10-CM

## 2018-04-05 DIAGNOSIS — I45.3 TRIFASCICULAR BLOCK: ICD-10-CM

## 2018-08-07 NOTE — ED ADULT NURSE NOTE - NS ED NURSE LEVEL OF CONSCIOUSNESS ORIENTATION
Patient called stating she is having a TMJ flare up. Patient was using ibuprofen 1 tab daily every 6 hours.  I encouraged patient to increase ibuprofen to 2 tab every 4 hours or 3 tabs every 6 hours for the next couple of days. Patient did get an over the counter mouth guard to try as well. Patient will call if she fails to see improvement.  
Oriented - self; Oriented - place; Oriented - time

## 2018-08-29 NOTE — DISCHARGE NOTE ADULT - NS MD DC PLAN IMMU FLU REF OTH
Physical Therapy Daily Treatment       Visit Count: 3  Plan of Care Dates: Initial: 8/15/2018 Through: 10/10/2018  Insurance Information: Visits used this year same as above number with KX at 16 visits.    Next Referring Provider Visit: 09/25/18    Referred by: Joel Diana MD  Medical Diagnosis (from order):  Myofascial pain [M79.1]  - Primary   Insurance: 1. MEDICARE  2. [employer]    Date of onset/injury: chronic  Diagnosis Instructions/Precautions:   Main focus of therapy should be myofascial release. Incorporate modalities for spine care / HEP per therapist's assessment  Precaution:  Right hip abductor muscle repair  Relevant Medical History/Treatment Precautions:  History of CAD, fracture sacrum as child, Lumbar laminectomy, bilateral TKA, bladder implant (electronic) in right buttock,     Current Work Status: Retired     General Information:   Progress Note Recommended: 09/15/18   Date of Evaluation/Progress Notes: 10/15/18    SUBJECTIVE   Current symptoms: Pt with a lot of pain this morning when sleeping and need to take medication for it. Pt felt some better after last session, but it is normally variable.   Current Pain: 1-2/10 right buttock, 1-2/10 lateral right hip, 1-2/10 anterior thigh   Functional Change: none reported  Other: PT did contact [email or web address] and they reported that it was contraindicated to use US with a pt with a bladder stimulator. PT and pt discusses the fact that she had it done at her chiropractor and that we would not be doing it in PT due to safe and best practice indicated for us not to. Pt reported she understood.     OBJECTIVE   Palpation:  Pt with palpable trigger points with taut bands in lateral and anterior quads.        Treatment   Therapeutic Activity:  Pt education/direction regarding not being able to do US per call to [employer] per above. Dry needle education per below.      Manual Therapy:   Patient has been made aware of treatment technique completed today and has  agreed.  STM, DTM and Trigger point release to lateral and anterior quads, lateral hip.     Dry Needling:  Patient provided a handout explaining intermuscular mobilization.  Patient has read the handout and has provided verbal agreement to proceed with the intervention. Pt reports having acupuncture in past more in spine area but some into the legs as well.   Size of needle used: 60mm; Needle count prior to treatment: 1; Needle count after treatment: 1;  Needling location(s): Rectus femoris and vastus intermedius ; Duration of treatment: 5 minutes.      Location #2:  Size of needle used: 60mm; Needle count prior to treatment: 1; Needle count after treatment: 1;  Needling location(s): vastus lateralis ; Duration of treatment: 5 minutes.     Location #3  Size of needle used: 60mm; Needle count prior to treatment: 1; Needle count after treatment: 1;  Needling location(s): Threading VMO staying away from knee joint ; Duration of treatment: 5 minutes.     Response to treatments  Pt response to treatment pt with no bleeding or bruising with good twitch response.   Pt home instruction for today use of ice as needed for pain and swelling, drink water and limit activities from high level activities. Return to normal activities as tolerated.   .     Therapeutic Exercise:   Hamstring stretch (and for MET SI) 5 x 10 sec      Current Home Program (not performed this date except as noted above):    Hamstring stretch (and for MET SI)      ASSESSMENT   Patient was instructed in no US per above, Dry needle treatment, POC.    Result of above outlined education: Verbalizes understanding    Patient tolerated treatment well today.    Pain after session is 0/10 right buttock, 1/10 lateral right hip, 3/10 anterior thigh   Patient with some pain initially after treatment today, PT will monitor overall response to determine if PT will continue treatment.       Goals:       See Evaluation on 08/15/18     PLAN   Plan for next session:  progress manual as tolerated. Possible dry needle if it is helping. Add IR/ER hip strength exercises.   Plan of care:  2 times per week for 8 weeks with tapering as the patient progresses  Continue Plan of care established in Evaluation on 08/15/18        Therapy Daily Billing:    Primary Insurance: MEDICARE   Secondary Insurance: [facility]       Evaluation Procedures:  No evaluation codes were used on this date of service    Timed Procedures:  Manual Therapy, 15 minutes  Therapeutic Activity, 13 minutes    Untimed Procedures:  Dry Needling - Unlisted Physical Medicine/Rehabilitation Service Or Procedure    Total Treatment Time: 43 minutes      I certify the need for these services, furnished under this plan of treatment and while under my care  Physician Signature on file.              Refused

## 2019-01-01 NOTE — CONSULT NOTE ADULT - PROBLEM SELECTOR RECOMMENDATION 9
This note was copied from the mother's chart.     12/01/19 1900   Maternal Assessment   Breast Shape Bilateral:;round   Breast Density Bilateral:;soft   Areola Bilateral:;elastic   Nipples Bilateral:;everted   Maternal Infant Feeding   Maternal Emotional State assist needed   Equipment Type   Breast Pump Type double electric, hospital grade   Breast Pump Flange Type hard   Breast Pump Flange Size 21 mm   Breast Pumping   Breast Pumping Interventions frequent pumping encouraged;early pumping promoted   Breast Pumping bilateral breasts pumped until soft;double electric breast pump utilized   Pt fitted for proper flange fit, 21mm is correct fit. Reviewed pumping education for use on initiation setting.   CBC  Anemia work up.  G-CSF

## 2019-04-24 NOTE — ED PROVIDER NOTE - CPE EDP CARDIAC NORM
Render In Strict Bullet Format?: No
Detail Level: Zone
Discontinue Regimen: Fluocinonide solution at this time
normal...

## 2019-07-08 NOTE — DISCHARGE NOTE ADULT - WEIGHT IN KG
Pt received from critical care RN. Pt on cardiac monitor. Lactate 1.6, MD aware. Awaiting all lab results to determine if code sepsis continues. Parents at bedside. UA/UC to be collected. Pt awaiting US. Call bell within reach. 73

## 2019-07-15 NOTE — PATIENT PROFILE ADULT. - AS SC BRADEN SENSORY
Patient has recorded history of schizoaffective disorder, but is currently not on treatment. She is not open to seeing inpatient psychiatry at the moment, and endorses that while she has anxiety, she believes it is currently under control. Patient has recorded history of schizoaffective disorder, but is currently not on treatment. She is not open to seeing inpatient psychiatry at the moment, and endorses that while she has anxiety, she believes it is currently under control. Patient reports onset after initial chemotherapy treatment.   - Not currently an active issue, although patient complained of some swelling while ambulating that was alleviated with rest   - VSS, denies SOB, lungs clear bilaterally  - EKG showed normal sinus rhythm  - ECHO also normal: EF 69%, no valvular abnormalities Patient has recorded history of schizoaffective disorder, but is currently not on treatment. She is not open to seeing inpatient psychiatry at the moment, and endorses that while she has anxiety, she believes it is currently under control. Patient reports onset after initial chemotherapy treatment.   - Not currently an active issue today.  - VSS, denies SOB, lungs clear bilaterally  - EKG showed normal sinus rhythm  - ECHO also normal: EF 69%, no valvular abnormalities Patient reports onset after initial chemotherapy treatment.   - Not currently an active issue today.  - VSS, denies SOB, lungs clear bilaterally  - EKG showed normal sinus rhythm  - ECHO also normal: EF 69%, no valvular abnormalities Patient has recorded history of schizoaffective disorder, but is currently not on treatment. She is not open to seeing inpatient psychiatry at the moment, and endorses that while she has anxiety, she believes it is well managed at the moment (3) slightly limited

## 2019-10-10 NOTE — H&P ADULT - PMH
CAD S/P percutaneous coronary angioplasty    DM (diabetes mellitus screen)    HTN (hypertension)    PVD (peripheral vascular disease)
abnormal/expand...

## 2019-11-25 NOTE — ED ADULT NURSE NOTE - NS ED PATIENT SAFETY CONCERN
"[facility]  Neurology  Progress Note    Patient Name: Ellen Maravilla  MRN: [id number]  Admission Date: 11/21/2019  Hospital Length of Stay: 4 days  Code Status: Full Code   Attending Provider: Jerzy Storm MD   Consulting Provider:  Sushil Centeno  Consulting NP: Lakeshia Jj NP  Primary Care Physician: Tien Mckeon MD  Principal Problem:Acute ischemic stroke      Subjective:     Chief Complaint:    Chief Complaint   Patient presents with    Aphasia     Last seen normal on Monday.       HPI: Ellen Maravilla is a 86 y.o. female with a PMHx HTN, CAD, thyroid disease, uterine cancer, and dementia who presented to the emergency department increased confusion and slurred speech. History limited due to patient's baseline dementia and increased confusion. Patient's son and grandson are at the bedside and providing history. The son reports that the patient became confused yesterday morning and had slurred speech and generalized weakness. The family brought her to see her PCP, Dr. Mckeon, who referred her to the ED for further evaluation. The son reports she was unable to get up from the chair today, and typically she is able to ambulate with her walker. The patient also had decreased appetite that began last night, and had nausea and vomiting upon arrival to Dr. Mckeon's office. Per the son she had one occurrence of emesis that he described as bile. The patient is currently reporting "spasming" right knee pain, the son denies any previous complaints of knee pain. Son denies of any known falls. The patient reports her nausea is improved at this time after receiving medication in the ED. The patient denies any shortness of breath, abdominal pain, chest pain, headaches, or cough.     The ED work up is significant for urinalysis revealing few bacteria and nitrite positive. Urine cx pending. CT head, abd/pelvis show no acute process. Started on IV rocephin. Patient will be placed in observation for " further work up and monitoring.        Neurology Interval History: Patient seen and examined with Dr. Crandall. Patient is able to be aroused with stimulation,  appears more drowsy today. Answer questions, Patient responds to name, but states wrong age and wrong location. Dysarthria noted expressive aphasia. Stroke score 14, Patient speech not improved, Patient seems worse today. Still ataxic. Left facial droop and left hand still weak. TTE was negative for PFO. Continue ASA 81mg daily. The patient denies any headache, tingling or numbness, or visual disturbance.     Past Medical History:   Diagnosis Date    Coronary artery disease     Dementia     Hypertension     Thyroid disease     Uterine cancer        Past Surgical History:   Procedure Laterality Date    APPENDECTOMY      BACK SURGERY      HYSTERECTOMY      KNEE SURGERY      TONSILLECTOMY         Review of patient's allergies indicates:  No Known Allergies    Current Neurological Medications:     Scheduled Meds:   amLODIPine  2.5 mg Oral Daily    aspirin  81 mg Oral Daily    atorvastatin  40 mg Oral Daily    calcium-vitamin D3  1 tablet Oral Daily    cefTRIAXone (ROCEPHIN) IVPB  1 g Intravenous Q24H    enoxaparin  40 mg Subcutaneous Daily    lidocaine  1 patch Transdermal Q24H    metoprolol tartrate  25 mg Oral BID    polyethylene glycol  17 g Oral Daily    quetiapine  12.5 mg Oral Daily    quetiapine  50 mg Oral Before dinner     Continuous Infusions:   sodium chloride 0.9%       PRN Meds:.acetaminophen, dextrose 50%, dextrose 50%, glucagon (human recombinant), glucose, glucose, ibuprofen, lorazepam, magnesium oxide, magnesium oxide, ondansetron, ondansetron, potassium chloride 10%, potassium chloride 10%, potassium chloride 10%, potassium, sodium phosphates, potassium, sodium phosphates, potassium, sodium phosphates, sodium chloride 0.9%, sodium chloride 0.9%, traMADol      No current facility-administered medications on file prior to  No encounter.      Current Outpatient Medications on File Prior to Encounter   Medication Sig    QUEtiapine (SEROQUEL) 25 MG Tab Take 25 mg by mouth every evening.      Family History     Problem Relation (Age of Onset)    Depression Father    Early death Father    No Known Problems Mother        Tobacco Use    Smoking status: Never Smoker    Smokeless tobacco: Never Used   Substance and Sexual Activity    Alcohol use: No     Comment: rarely    Drug use: No    Sexual activity: Not on file     Review of Systems   Constitutional: Positive for activity change.   HENT: Positive for trouble swallowing and voice change. Negative for tinnitus.    Respiratory: Negative.    Cardiovascular: Negative.    Gastrointestinal: Negative.    Genitourinary: Negative.    Musculoskeletal: Negative.    Neurological: Positive for speech difficulty and weakness. Negative for dizziness, tremors, seizures, syncope, facial asymmetry, light-headedness, numbness and headaches.   Hematological: Negative.    Psychiatric/Behavioral: Negative.      Objective:     Vital Signs (Most Recent):  Temp: 96.8 °F (36 °C) (11/25/19 0731)  Pulse: 88 (11/25/19 0731)  Resp: 16 (11/25/19 0731)  BP: 137/88 (11/25/19 0731)  SpO2: 99 % (11/25/19 0731) Vital Signs (24h Range):  Temp:  [96 °F (35.6 °C)-97.8 °F (36.6 °C)] 96.8 °F (36 °C)  Pulse:  [53-88] 88  Resp:  [16-18] 16  SpO2:  [97 %-99 %] 99 %  BP: (119-150)/(59-88) 137/88     Weight: 78.1 kg (172 lb 2.9 oz)  Body mass index is 27.79 kg/m².    Physical Exam   Constitutional: She appears well-developed and well-nourished.   HENT:   Head: Normocephalic and atraumatic.   Eyes: Pupils are equal, round, and reactive to light. EOM are normal.   Neck: Normal range of motion. Neck supple.   Cardiovascular: Normal rate.   Pulmonary/Chest: Effort normal.   Abdominal: Soft.   Musculoskeletal: Normal range of motion.   Neurological: She is alert. She displays normal reflexes. No cranial nerve deficit or sensory deficit.  She exhibits normal muscle tone. She has an abnormal Finger-Nose-Finger Test. Coordination abnormal. GCS eye subscore is 3 - to speech. GCS verbal subscore is 2 - incomprehensible speech. GCS motor subscore is 4 - withdraws from pain.   Reflex Scores:       Tricep reflexes are 2+ on the right side and 2+ on the left side.       Bicep reflexes are 2+ on the right side and 2+ on the left side.       Brachioradialis reflexes are 2+ on the right side and 2+ on the left side.       Patellar reflexes are 2+ on the right side and 2+ on the left side.       Achilles reflexes are 2+ on the right side and 2+ on the left side.  Skin: Skin is warm and dry. Capillary refill takes less than 2 seconds.   Psychiatric: Her speech is slurred.   Withdrawn        NEUROLOGICAL EXAMINATION:     MENTAL STATUS   Oriented to person.   Disoriented to place. Disoriented to city and area.   Disoriented to time.   Follows 1 step commands.   Attention: decreased. Concentration: decreased.   Speech: slurred   Level of consciousness: drowsy ,  responsive to painful stimuli  Knowledge: poor and inconsistent with education.        Very inattentive.      CRANIAL NERVES   Cranial nerves II through XII intact.     CN III, IV, VI   Pupils are equal, round, and reactive to light.  Extraocular motions are normal.     MOTOR EXAM   Muscle bulk: normal    Strength   Right neck flexion: 4/5  Left neck flexion: 4/5  Right neck extension: 4/5  Left neck extension: 4/5  Right deltoid: 4/5  Left deltoid: 4/5  Right biceps: 4/5  Left biceps: 4/5  Right triceps: 4/5  Left triceps: 4/5  Right wrist flexion: 4/5  Left wrist flexion: 4/5  Right wrist extension: 4/5  Left wrist extension: 4/5  Right interossei: 4/5  Left interossei: 4/5  Right abdominals: 3/5  Left abdominals: 3/5  Right iliopsoas: 3/5  Left iliopsoas: 3/5  Right quadriceps: 3/5  Left quadriceps: 3/5  Right hamstring: 3/5  Left hamstring: 3/5  Right glutei: 3/5  Left glutei: 3/5  Right anterior tibial:  3/5  Left anterior tibial: 3/5  Right posterior tibial: 3/5  Left posterior tibial: 3/5  Right peroneal: 3/5  Left peroneal: 3/5  Right gastroc: 3/5  Left gastroc: 3/5    REFLEXES     Reflexes   Right brachioradialis: 2+  Left brachioradialis: 2+  Right biceps: 2+  Left biceps: 2+  Right triceps: 2+  Left triceps: 2+  Right patellar: 2+  Left patellar: 2+  Right achilles: 2+  Left achilles: 2+  Right : 2+  Left : 2+    SENSORY EXAM   Light touch normal.     GAIT AND COORDINATION      Coordination   Finger to nose coordination: abnormal    NIH Stroke Scale:    Level of Consciousness: 1 - drowsy  LOC Questions: 1 - answers one correctly    Best Gaze: 0 - normal  Visual: 0 - no visual loss  Facial Palsy: 1 - minor  Motor Left Arm: 1 - drift  Motor Right Arm: 1 - drift  Motor Left Leg: 3 - no effort against gravity  Motor Right Leg: 3 - no effort against gravity  Limb Ataxia: 1 - present in one limb  Sensory: 0 - normal  Best Language: 1 - mild to moderate aphasia  Dysarthria: 2 - near to unintelligible  Extinction and Inattention: 0 - no neglect  NIH Stroke Scale Total: 14  [place] Coma Scale:  Best Eye Response: 3 - to speech  Best Motor Response: 4 - withdraws from pain  Best Verbal Response: 2 - incomprehensible speech  [place] Coma Scale Total: 9            Significant Labs:     Lab Results   Component Value Date    CREATININE 0.6 11/25/2019     Lab Results   Component Value Date    HGBA1C 5.1 11/21/2019     Lab Results   Component Value Date    TSH 3.719 11/21/2019     Lab Results   Component Value Date    LDLCALC 132.0 11/21/2019       Significant Imaging:    CT head without contrast  Impression       1. There is generalized volume loss with moderate to marked nonspecific white matter change.  There is no hemorrhage, mass, mass effect or obvious acute infarction.  It should be noted that MRI is more sensitive in the detection of subtle or acute nonhemorrhagic ischemic disease.  2. Atherosclerosis.            Mri Brain Without Contrast    Result Date: 11/22/2019  1. There are 3 small linear foci of acute nonhemorrhagic infarction in the central kathleen. 2. There is generalized volume loss with moderate to marked nonspecific white matter change.  These findings likely reflect sequelae of chronic small vessel disease.  Also, there is a small chronic infarction in the posterior right cerebellum. This report was flagged in Epic as abnormal.  Abnormal critical results were discussed with the patient's nurse, Candelaria Kuhn, at 8:35 am 11/22/2019 Electronically signed by: Everett Coello MD Date:    11/22/2019 Time:    08:35     Carotid Bilateral    Result Date: 11/22/2019  No evidence of a hemodynamically significant carotid bifurcation stenosis. Flow in vertebral arteries appears antegrade bilaterally Electronically signed by: Cee Boyd MD Date:    11/22/2019 Time:    08:37    MRA brain without contrast   Impression       Asymmetrically small and irregular distal P 2 segment of the right posterior cerebral artery, likely due to underlying atherosclerosis.  Otherwise, normal magnetic resonance angiogram of the rrxjyn-rt-Oiulwj vasculature.           ECHO   · Concentric left ventricular remodeling.  · Small left ventricular cavity size.  · Normal left ventricular systolic function. The estimated ejection fraction is 55%  · Grade I (mild) left ventricular diastolic dysfunction consistent with impaired relaxation.  · Mild right ventricular enlargement.  · Normal right ventricular systolic function.  · Mild right atrial enlargement.  · Mild to moderate tricuspid regurgitation.  · Indeterminate central venous pressure. Estimated PA pressure is at least 19 mmHg.  · No PFO on saline bubble and color flow study       Assessment and Plan:    Patient is 85 y/o female, able to be aroused with stimulation, appears more drowsy today. Patient responds to name, but reponded inappropriately  when asked what city, state, and   wrong age.  Dysarthria noted expressive aphasia. Stroke score 14, Patient speech not improved, Patient seems worse today. Still ataxic. Left facial droop and left hand still weak. TTE was negative for PFO. Continue ASA 81mg daily. The patient denies any headache, tingling or numbness, or visual disturbance. Recommend PT/OT/ST,  Will continue to follow    1. Acute nonhemorrhagic infarction in the central kathleen  Etiology: likely small vessel disease, but r/o cardioembolic source  -due to right atrial enlargement seen on TTE, will recommend outpatient cardiac event monitoring (at least 30 days) to rule out occult paroxysmal atrial fibrillation  -ASA 81mg daily for secondary stroke prevention  -Statins for secondary stroke prevention and hyperlipidemia  -Risk factor modification: HTN, HLD  -PT/OT/ST    2. Hyperlipidemia  -High intensity statin for secondary stroke prevention  -Life style modification; LDL goal <70          [phone] within 2 weeks from discharge.  Stroke education was provided to patient/family members including stroke risk factor modification and patient/family members were informed if patient experiences any acute neurological changes including weakness, confusion, visual changes to come straight to the ER.  All side effects of new medications were discussed with patient/family members.             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] 11/24/2019 Yes    DDD (degenerative disc disease), lumbar [M51.36] 11/24/2019 Yes    Debility [R53.81] 11/24/2019 Yes    Hypomagnesemia  [E83.42] 11/24/2019 Yes    Delirium [R41.0] 11/23/2019 No    Acute cystitis without hematuria [N30.00] 11/21/2019 Yes    Moderate malnutrition [E44.0] 11/21/2019 Yes    Vascular dementia [F01.50] 11/21/2019 Yes    Port-A-Cath in place [Z95.828] 11/21/2019 Not Applicable    Hyperlipidemia [E78.5] 11/21/2019 Yes    Closed compression fracture of thoracic vertebra, T9 and old multiple lumbar compression fractures [S22.000A] 12/11/2017 Yes    Hypertension [I10] 02/10/2015 Yes    Hypothyroidism [E03.9] 02/10/2015 Yes    Osteoporosis [M81.0] 02/10/2015 Yes      Problems Resolved During this Admission:    Diagnosis Date Noted Date Resolved POA    Hyponatremia [E87.1] 11/24/2019 11/24/2019 No       VTE Risk Mitigation (From admission, onward)         Ordered     enoxaparin injection 40 mg  Daily      11/21/19 1548     IP VTE HIGH RISK PATIENT  Once      11/21/19 1548                Thank you for your consult. I will follow-up with patient. Please contact us if you have any additional questions.    Lakeshia Jj NP  Neurology  [facility]    I have seen the patient, reviewed the Nurse Practitioner's progress note. I have personally interviewed and examined the patient at bedside and agree with the findings.

## 2020-03-15 NOTE — PATIENT PROFILE ADULT. - NS TRANSFER DENTURES
Labs are okay kidney function are within normal limits  LDL bad cholesterol has slightly improved from last time but is still elevated need to be below 100 need to consider statin to reduce risk of stroke and coronary arty disease  There was a problem  With HB A1C it was not resulted, patient come come by the office for nurse visit for an office hemoglobin A1c  Test Upper/Lower

## 2020-10-05 NOTE — H&P ADULT. - PULMONARY EMBOLUS
"Subjective:      Misael Owens is a 50 y.o. male who was self-referred for evaluation of   Pt is not sure why he is here    He has ESRD on dialysis and is bieng evaluated for a kidney transplant.      No gross hematuria  No LUTS      Elevated psa  No fam hist of prostate ca  Pt is         The following portions of the patient's history were reviewed and updated as appropriate: allergies, current medications, past family history, past medical history, past social history, past surgical history and problem list.    Review of Systems  Constitutional: no fever or chills  ENT: no nasal congestion or sore throat  Respiratory: no cough or shortness of breath  Cardiovascular: no chest pain or palpitations  Gastrointestinal: no nausea or vomiting, tolerating diet  Genitourinary: as per HPI  Hematologic/Lymphatic: no easy bruising or lymphadenopathy  Musculoskeletal: no arthralgias or myalgias  Neurological: no seizures or tremors  Behavioral/Psych: no auditory or visual hallucinations     Objective:   Vitals: /76 (BP Location: Right arm, Patient Position: Sitting, BP Method: X-Large (Automatic))   Pulse 79   Ht 5' 9" (1.753 m)   Wt 102.5 kg (226 lb)   BMI 33.37 kg/m²     Physical Exam   General: alert and oriented, no acute distress  Head: normocephalic, atraumatic  Neck: normal ROM  Respiratory: Symmetric expansion, non-labored breathing  Cardiovascular: no peripheral edema  Abdomen: soft, non tender, non distended, no palpable masses, no hernias, no hepatomegaly or splenomegaly  Genitourinary:   Penis: normal, no lesions, patent orthotopic meatus, no plaques  Scrotum: no rashes or skin changes;   Testes: descended bilaterally, no masses, nontender, normal epididymides bilaterally, no hydroceles  Prostate: 40g small nodule on left; seminal vesicles not palpated  Rectum: normal rectal tone, no rectal mass, normal perineum  Lymphatic: no inguinal nodes  Skin: normal coloration and turgor, no rashes, no " suspicious skin lesions noted  Neuro: alert and oriented x3, no gross deficits  Psych: normal judgment and insight, normal mood/affect and non-anxious    Physical Exam    Lab Review   Urinalysis demonstrates negative for all components  Lab Results   Component Value Date    WBC 6.57 05/06/2020    HGB 10.9 (L) 05/06/2020    HCT 36.4 (L) 05/06/2020    MCV 92 05/06/2020     05/06/2020     Lab Results   Component Value Date    CREATININE 10.2 (H) 05/06/2020    BUN 47 (H) 05/06/2020     Lab Results   Component Value Date    PSA 2.1 05/06/2020      Ref Range & Units 5mo ago   RBC, UA 0 - 4 /hpf 0    WBC, UA 0 - 5 /hpf 1    Bacteria None-Occ /hpf Rare    Squam Epithel, UA /hpf 1    Hyaline Casts, UA 0-1/lpf /lpf 0    Microscopic Comment  SEE COMMENT          10/26/2020      Imaging     Impression:     1. Bilateral chronic medical renal disease.  2. 1.4 cm left parapelvic cyst.     Electronically signed by resident: Graciela Hernandez  Date:                                            05/06/2020  Time:                                           13:47     Electronically signed by: Nabil Ramírez MD  Date:                                            05/06/2020  Time:                                           14:03  Assessment:     1. Renal cyst    2. Elevated PSA    3. Abnormal prostate exam    4.   Renal transplant eval  -  Plan:     Orders Placed This Encounter    Urine culture    Diet NPO    POCT URINE DIPSTICK WITHOUT MICROSCOPE    ciprofloxacin HCl (CIPRO) 500 MG tablet    Case Request Operating Room: BIOPSY, PROSTATE, RECTAL APPROACH, WITH US GUIDANCE    Progressive Mobility Protocol (mobilize patient to their highest level of functioning at least twice daily)      Urine cs  Schedule prostate biopsy at [facility] Monday 10/19  Cipro 400mg daily begin day prior to biopsy  Enemas prior   no

## 2021-02-20 NOTE — H&P ADULT - HISTORY OF PRESENT ILLNESS
pt c CAD generalized weakness reported today Na 119. pt was referred to ER pt c CAD generalized weakness reported today Na 119. pt was referred to ER.  Leukopenia Adult

## 2021-05-17 NOTE — ED ADULT TRIAGE NOTE - NS ED NURSE BANDS TYPE
HISTORY OF PRESENT ILLNESS  Олег Yanez is a 62 y.o. female. Patient presents with:  Cold Symptoms: times 5 days neg fever chills     She is getting worse. Evidently, she tried an OTC sinus med with mild relief. Review of Systems   Constitutional: Positive for malaise/fatigue. Negative for chills and fever. HENT: Positive for congestion and ear pain. Negative for sore throat. Mucous is clear in color. There is sinus pain. Respiratory: Positive for cough. Negative for sputum production and shortness of breath. Neurological: Positive for headaches. Visit Vitals  /75   Pulse (!) 53   Temp 96.8 °F (36 °C) (Tympanic)   Resp 18   Ht 5' 6\" (1.676 m)   Wt 165 lb (74.8 kg)   SpO2 98%   BMI 26.63 kg/m²     Physical Exam  Vitals signs and nursing note reviewed. Constitutional:       General: She is not in acute distress. Appearance: She is well-developed. She is not diaphoretic. HENT:      Head: Normocephalic. Right Ear: Tympanic membrane, ear canal and external ear normal.      Left Ear: Tympanic membrane, ear canal and external ear normal.      Nose: Nose normal.      Right Sinus: No maxillary sinus tenderness or frontal sinus tenderness. Left Sinus: No maxillary sinus tenderness or frontal sinus tenderness. Mouth/Throat:      Pharynx: Uvula midline. Eyes:      General:         Right eye: No discharge. Left eye: No discharge. Conjunctiva/sclera:      Right eye: Right conjunctiva is not injected. Left eye: Left conjunctiva is not injected. Cardiovascular:      Rate and Rhythm: Normal rate and regular rhythm. Heart sounds: Normal heart sounds. No murmur. No friction rub. No gallop. Pulmonary:      Effort: Pulmonary effort is normal. No respiratory distress. Breath sounds: Normal breath sounds. No wheezing or rales. Lymphadenopathy:      Cervical: No cervical adenopathy. Skin:     General: Skin is warm and dry. Neurological:      Mental Status: She is alert and oriented to person, place, and time. ASSESSMENT and PLAN    ICD-10-CM ICD-9-CM    1. Viral upper respiratory tract infection  J06.9 465.9 PSEUDOEPHEDRINE-guaiFENesin (Mucinex D Maximum Strength) Tb12 extended release tablet   2. Sinus pain  J34.89 478.19 azithromycin (Zithromax) 500 mg tab      PSEUDOEPHEDRINE-guaiFENesin (Mucinex D Maximum Strength) Tb12 extended release tablet        Do not think this is sinusitis  Rest, push fluids  Mucinex D prn  I have given her a ZMax prescription to take if her mucus becomes discolored    Follow-up and Dispositions    · Return if not better in 5 days. Reviewed plan of care. Patient has provided input and agrees with goals. Name band;

## 2021-08-09 NOTE — PATIENT PROFILE ADULT. - SOCIAL CONCERNS
Patient had a VV with Dr. Joanna Medel on 7/27/21. Mom states they never received the stool sample kit, but also states the blood is not in her stool it is on the tissue when she wipes and some drips into the toilet bowl. Also complaining of headaches and having panic attacks. Asking for an appointment with you after 2:30. None

## 2022-01-15 NOTE — DISCHARGE NOTE ADULT - PLAN OF CARE
Pt has cough and difficulty breathing since last night. Mom states his sister was admitted overnight recently for a virus, not COVID. Pt has upper airway congestion notes is awake and alert. wt control, BMP f/u low carbohydrates resolving f/u with Dr. Walker office within this week to repeat labs

## 2022-02-24 NOTE — ED ADULT TRIAGE NOTE - ARRIVAL FROM
Home Electrodesiccation And Curettage Text: The wound bed was treated with electrodesiccation and curettage after the biopsy was performed.

## 2022-02-26 NOTE — PHYSICAL THERAPY INITIAL EVALUATION ADULT - IMPAIRMENTS CONTRIBUTING IMPAIRED BED MOBILITY, REHAB EVAL
Problem: Falls - Risk of:  Goal: Will remain free from falls  Description: Will remain free from falls  2/25/2022 2334 by Almaz Oliver RN  Outcome: Ongoing  2/25/2022 1608 by Girma Palacios RN  Outcome: Ongoing  Goal: Absence of physical injury  Description: Absence of physical injury  2/25/2022 2334 by Almaz Oliver RN  Outcome: Ongoing  2/25/2022 1608 by Girma Palacios RN  Outcome: Ongoing     Problem: Skin Integrity:  Goal: Will show no infection signs and symptoms  Description: Will show no infection signs and symptoms  2/25/2022 2334 by Almaz Oliver RN  Outcome: Ongoing  2/25/2022 1608 by Girma Palacios RN  Outcome: Ongoing  Goal: Absence of new skin breakdown  Description: Absence of new skin breakdown  2/25/2022 2334 by Almaz Oliver RN  Outcome: Ongoing  2/25/2022 1608 by Girma Palacios RN  Outcome: Ongoing     Problem: HEMODYNAMIC STATUS  Goal: Patient has stable vital signs and fluid balance  2/25/2022 2334 by Almaz Oliver RN  Outcome: Ongoing  2/25/2022 1608 by Girma Palacios RN  Outcome: Ongoing     Problem: COMMUNICATION IMPAIRMENT  Goal: Ability to express needs and understand communication  2/25/2022 2334 by Almaz Oliver RN  Outcome: Ongoing  2/25/2022 1608 by iGrma Palacios RN  Outcome: Ongoing     Problem: ACTIVITY INTOLERANCE/IMPAIRED MOBILITY  Goal: Mobility/activity is maintained at optimum level for patient  2/25/2022 2334 by Almaz Oliver RN  Outcome: Ongoing  2/25/2022 1608 by Girma Paalcios RN  Outcome: Ongoing decreased strength

## 2022-12-07 NOTE — PHYSICAL THERAPY INITIAL EVALUATION ADULT - LEVEL OF CONSCIOUSNESS, REHAB EVAL
Cm note    Rounds completed     12/6 s/p  R THR,   room air,   desating and dizzy w PT and  ambulation 12/7 , changed to inpt and monitor 1 more noc.   From home w/ . lives in [place].  [facility] out of area,  [facility] accepted [phone], approved by Radha Vidal.    S/w pt and spouse , from home   dme pta - rw  Services pta - none  rx concerns pta - none    stated goal home w/ hh    dispo - home w/ sp and [facility] when medically cleared     azalia biggs       alert

## 2023-01-04 NOTE — ED PROVIDER NOTE - CPE EDP SKIN NORM
Spoke to patient. She is injecting her 4th week loading dose today. Informed her she is to get her 5th week loading dose one week from today (1/11/23). RTS until 1/6/23. Patient OK with callback Friday to set up refill. Reports medication is working wonderfully.    normal...

## 2023-01-08 NOTE — ED ADULT TRIAGE NOTE - ACCOMPANIED BY
January 19, 2023      Deandre Arellano  [address] [facility] [address]              Dear Deandre,      We recently received a call from your pharmacy requesting a refill of your medication Sertraline.    We are contacting you today to notify you that you are due for a medication check for further refills.    We have authorized one refill of your medication to allow time for you to schedule your appointment.    This appointment can be in clinic or virtual by either telephone, video.    Please call [phone] schedule an appointment or if you have MyChart you can schedule with your provider as well.    Taking care of your health is important to us, an ongoing visits with your provider are vital to your care. We look forward to seeing you in the near future.    Thank you for using [facility] for your Medical Needs.          Sincerely,      Emely Lawrence CNP    
Immediate family member

## 2023-04-29 NOTE — CONSULT NOTE ADULT - SUBJECTIVE AND OBJECTIVE BOX
[facility]-Med/Surg  Discharge Note  Short Stay    * No surgery found *      OUTCOME: Condition has improved and patient is now ready for discharge.    DISPOSITION: Home or Self Care    FINAL DIAGNOSIS:  <principal problem not specified>    FOLLOWUP: In clinic  Diagnosis: Anemia   Diabetes mellitus      Heart disease      Hypertension      Peripheral artery disease        DISCHARGE INSTRUCTIONS:    Discharge Procedure Orders   Type & Screen - Lab Collect   Standing Status: Future Number of Occurrences: 1 Standing Exp. Date: 05/28/24     Prepare RBC 2 Units; Low H&H   Standing Status: Future Standing Exp. Date: 05/28/24     Order Specific Question Answer Comments   Number of Units 2 Units    Reason to prepare multiple units (e.g. Emergency): Low H&H    Purpose of Preparation: Pending Transfusion    Special Requirements Leukoreduced         TIME SPENT ON DISCHARGE: 38 minutes   MEDICAL RECORD REVIEWED; HISTORY AND  REVIEW OF SYSTEMS OBTAINED; PATIENT EXAMINED:    85 YEAR OLD  FEMALE WITH DIABETES MELLITUS,  chronic kidney disease, ASHD, RECENT PCI IN SETTING OF NSTEMI, LV DYSFUNCTION, PERIPHERAL VASCUALAR DISEASE;  WITH WORSENING DYSPNEA, NO ANGINA; + TROPONINS, ECG:NSR FIRST DEGREE AV BLOCK BIFASICULAR BLOCK: LAFB/RBBB NO ISCHEMIA,  CHEST XRAY:: CARDIOMEGALY AND CHF/CONGESTION; ANEMIC    ON EXAM: BREATHING BETTER, VOIDING, GOOD AIR ENTRY SOFT S1 NO APPRECIABLE MURMURS,RUBS OR GALLOPS;  NON TENDER ABDOMEN; EDEMA     IMPRESSION:    ACUTE ON CHRONIC SYSTOLIC HEART FAILURE SECONDARY TO: ASHD, NSTEMI, chronic kidney disease;  AGREE WITH PRESENT MANAGEMENT  DIURESIS  RENAL FU  dual antiplatelet therapy, BETA BLOCKER , ACE INHIBITOR AS TOLERABLE FROM A RENAL STANDPOINT, HYDRALAZINE/NITRATES  TO REVIEW RECENT CARDIAC CATH REPORT    DAYO COTTON MD, FACC

## 2023-08-22 NOTE — CONSULT NOTE ADULT - CONSULT REASON
CONGESTIVE HEART FAILURE Skyrizi Counseling: I discussed with the patient the risks of risankizumab-rzaa including but not limited to immunosuppression, and serious infections.  The patient understands that monitoring is required including a PPD at baseline and must alert us or the primary physician if symptoms of infection or other concerning signs are noted.

## 2023-10-17 NOTE — DISCHARGE NOTE ADULT - CLICK TO LAUNCH ORM
Pt known to service. Multiple attempts at managing first time ptx with IR chest tube, but lung continues to drop. Plan for Right mechanical pleurodesis possible blebectomy. Pt agrees to proceed.    .

## 2023-11-14 NOTE — PROGRESS NOTE ADULT - SUBJECTIVE AND OBJECTIVE BOX
Conjuntivae and eyelids appear normal , Sclerae : White without injection, EOMI HEMODYNAMICALLY STABLE  TELEMETRY: NORMAL SINUS RHYTHM  RECEIVING DIALYSIS  SUPPORTIVE CARE  FAMILY PRESENT AT BEDSIDE

## 2024-03-13 NOTE — DISCHARGE NOTE ADULT - HOSPITAL COURSE
Received request for records from [facility]. Patient seeing Dr. Sumit Lopez. Only had visit from 2018. Faxed over OV an EKG done in 2021 at PCP.    pt PTER c N/V. Dx c hypoNA, mild > troponin. ARF. Rx c fluids. Lisinopril/HCTZ d/c.  Much improved. CV status stable.  d/c in stable condition , f/u in 3-5 days in office

## 2024-05-20 NOTE — PATIENT PROFILE ADULT. - PT NEEDS ASSIST
From: Nadya Gordon  To: Moira Nevarez MD  Sent: 5/20/2024 1:45 PM CDT  Subject: Weight meds    Hello!   It looks like unfortunately wegovy isn't covered by insurance. Would we be able to try then the other medication and see if that is covered by insurance?   
Noted message please return call to patient.  I would wait for prior authorization team to look into it.  
yes

## 2025-03-04 NOTE — PATIENT PROFILE ADULT. - PRO MENTAL HEALTH SX RECENT
Acute urticaria episode of on 13 February.  No obvious trigger through history.  No NSAID trigger no food or medication trigger.  Unlikely to represent a food allergy given that urticaria persist for 3 or 4 days afterwards.  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  At this point no further treatment is needed, no urticaria has occurred for nearly 3 weeks now.  Would suggest keeping antihistamines on hand.  If urticaria returns, recommend treating with second-generation antihistamine.  Use cetirizine 10 mg, can increase dose to twice daily if needed, the maximum dose would be 2 tablets twice daily.    Extensive lab evaluation will not be helpful in identifying acute urticaria of this nature.  Nothing in the history points to a specific food allergy and nothing points to medication allergy although I asked family multiple times about whether there might be an NSAID exposure they do not recall any exposure.  No obvious food testing based on history.  The fact that the urticaria persisted for several days afterwards argues against a food allergy.   none

## 2025-03-28 NOTE — PATIENT PROFILE ADULT. - NS PRO TALK SOMEONE YN
Last appointment: 12/6/24  Next appointment: none  Previous refill encounter(s): 12/6/24 #25 with 3 refills    Requested Prescriptions     Pending Prescriptions Disp Refills    nitroGLYCERIN (NITROSTAT) 0.4 MG SL tablet [Pharmacy Med Name: Nitroglycerin Sublingual Tablet Sublingual 0.4 MG] 25 tablet 11     Sig: PLACE 1 TAB UNDER TONGUE EVERY 5 MINUTES AS NEEDED FOR CHEST PAIN. MAX 3 TOTAL DOSES. IF NO RELIEF AFTER 1 DOSE, CALL 911         For Pharmacy Admin Tracking Only    Program: Medication Refill  CPA in place:    Recommendation Provided To:   Intervention Detail: New Rx: 1, reason: Patient Preference  Intervention Accepted By:   Gap Closed?:    Time Spent (min): 5   no

## 2025-05-08 NOTE — CONSULT NOTE ADULT - PROVIDER SPECIALTY LIST ADULT
Pulmonology Writer spoke with pt and discussed results and recommendations. Pt verbalized understanding and stated she is in agreement with the referral to endocrinology. Pt stated no further questions and stated she is appreciative of the care provider by PCP and team.

## 2025-05-11 NOTE — DIETITIAN INITIAL EVALUATION ADULT. - SIGNS/SYMPTOMS
Principal Discharge DX:	Visit for wound check   1 Wt loss of 6.3% x 5 months; Pt report of decreased appetite